# Patient Record
Sex: MALE | Race: BLACK OR AFRICAN AMERICAN | NOT HISPANIC OR LATINO | Employment: FULL TIME | URBAN - METROPOLITAN AREA
[De-identification: names, ages, dates, MRNs, and addresses within clinical notes are randomized per-mention and may not be internally consistent; named-entity substitution may affect disease eponyms.]

---

## 2017-09-18 ENCOUNTER — HOSPITAL ENCOUNTER (EMERGENCY)
Facility: HOSPITAL | Age: 57
Discharge: LEFT BEFORE DISCHARGE | End: 2017-09-18

## 2017-09-18 VITALS
HEIGHT: 74 IN | WEIGHT: 200 LBS | HEART RATE: 91 BPM | BODY MASS INDEX: 25.67 KG/M2 | TEMPERATURE: 98.2 F | DIASTOLIC BLOOD PRESSURE: 77 MMHG | RESPIRATION RATE: 16 BRPM | OXYGEN SATURATION: 98 % | SYSTOLIC BLOOD PRESSURE: 117 MMHG

## 2017-09-18 PROCEDURE — 99211 OFF/OP EST MAY X REQ PHY/QHP: CPT

## 2017-09-18 RX ORDER — ATORVASTATIN CALCIUM 40 MG/1
40 TABLET, FILM COATED ORAL DAILY
COMMUNITY
End: 2021-11-23 | Stop reason: SDUPTHER

## 2017-09-18 RX ORDER — HYDROCHLOROTHIAZIDE 25 MG/1
25 TABLET ORAL DAILY
COMMUNITY
End: 2021-11-23 | Stop reason: SDUPTHER

## 2017-09-18 RX ORDER — ASPIRIN 81 MG/1
81 TABLET ORAL DAILY
COMMUNITY

## 2017-09-18 RX ORDER — LISINOPRIL 5 MG/1
5 TABLET ORAL DAILY
COMMUNITY
End: 2021-08-06

## 2017-09-18 RX ORDER — OMEPRAZOLE 40 MG/1
40 CAPSULE, DELAYED RELEASE ORAL DAILY
COMMUNITY
End: 2021-11-23 | Stop reason: SDUPTHER

## 2017-09-18 RX ORDER — AMLODIPINE BESYLATE 10 MG/1
10 TABLET ORAL DAILY
COMMUNITY
End: 2021-08-06

## 2019-12-26 ENCOUNTER — HOSPITAL ENCOUNTER (OUTPATIENT)
Dept: OTHER | Facility: HOSPITAL | Age: 59
Discharge: HOME | End: 2019-12-26

## 2020-01-02 ENCOUNTER — HOSPITAL ENCOUNTER (OUTPATIENT)
Dept: OTHER | Facility: HOSPITAL | Age: 60
Discharge: HOME | End: 2020-01-02

## 2021-01-27 ENCOUNTER — OFFICE VISIT CONVERTED (OUTPATIENT)
Dept: UROLOGY | Facility: CLINIC | Age: 61
End: 2021-01-27
Attending: NURSE PRACTITIONER

## 2021-01-27 ENCOUNTER — HOSPITAL ENCOUNTER (OUTPATIENT)
Dept: OTHER | Facility: HOSPITAL | Age: 61
Discharge: HOME | End: 2021-01-27
Attending: NURSE PRACTITIONER

## 2021-01-27 LAB — PSA SERPL-MCNC: 0.67 NG/ML (ref 0–4)

## 2021-03-08 ENCOUNTER — OFFICE VISIT CONVERTED (OUTPATIENT)
Dept: UROLOGY | Facility: CLINIC | Age: 61
End: 2021-03-08
Attending: NURSE PRACTITIONER

## 2021-03-08 LAB
BILIRUB UR QL STRIP: NEGATIVE
COLOR UR: YELLOW
CONV BACTERIA IN URINE MICRO: 0
CONV CALCIUM OXALATE CRYSTALS /HPF IN URINE SEDIMENT BY MICROSCOPY: 0
CONV CLARITY OF URINE: CLEAR
CONV PROTEIN IN URINE BY AUTOMATED TEST STRIP: NEGATIVE
CONV UROBILINOGEN IN URINE BY AUTOMATED TEST STRIP: NORMAL
GLUCOSE UR QL: NEGATIVE
HGB UR QL STRIP: NEGATIVE
KETONES UR QL STRIP: NEGATIVE
LEUKOCYTE ESTERASE UR QL STRIP: NEGATIVE
NITRITE UR QL STRIP: NEGATIVE
PH UR STRIP.AUTO: 5.5 [PH]
RBC #/AREA URNS HPF: 0 /[HPF]
RENAL EPI CELLS #/AREA URNS HPF: 0 /[HPF]
SP GR UR: 1.02
SQUAMOUS SPT QL MICRO: 0
WBC #/AREA URNS HPF: 0 /[HPF]

## 2021-04-02 ENCOUNTER — OFFICE VISIT CONVERTED (OUTPATIENT)
Dept: FAMILY MEDICINE CLINIC | Age: 61
End: 2021-04-02
Attending: FAMILY MEDICINE

## 2021-04-02 ENCOUNTER — HOSPITAL ENCOUNTER (OUTPATIENT)
Dept: OTHER | Facility: HOSPITAL | Age: 61
Discharge: HOME | End: 2021-04-02
Attending: FAMILY MEDICINE

## 2021-04-02 LAB
ALBUMIN SERPL-MCNC: 4.2 G/DL (ref 3.5–5)
ALBUMIN/GLOB SERPL: 1.1 {RATIO} (ref 1.4–2.6)
ALP SERPL-CCNC: 98 U/L (ref 56–155)
ALT SERPL-CCNC: 20 U/L (ref 10–40)
ANION GAP SERPL CALC-SCNC: 20 MMOL/L (ref 8–19)
AST SERPL-CCNC: 20 U/L (ref 15–50)
BASOPHILS # BLD MANUAL: 0.04 10*3/UL (ref 0–0.2)
BASOPHILS NFR BLD MANUAL: 0.4 % (ref 0–3)
BILIRUB SERPL-MCNC: 0.63 MG/DL (ref 0.2–1.3)
BUN SERPL-MCNC: 12 MG/DL (ref 5–25)
BUN/CREAT SERPL: 12 {RATIO} (ref 6–20)
CALCIUM SERPL-MCNC: 9.3 MG/DL (ref 8.7–10.4)
CHLORIDE SERPL-SCNC: 95 MMOL/L (ref 99–111)
CHOLEST SERPL-MCNC: 109 MG/DL (ref 107–200)
CHOLEST/HDLC SERPL: 2.3 {RATIO} (ref 3–6)
CONV CO2: 26 MMOL/L (ref 22–32)
CONV CREATININE URINE, RANDOM: 69 MG/DL (ref 10–300)
CONV MICROALBUM.,U,RANDOM: <12 MG/L (ref 0–20)
CONV TOTAL PROTEIN: 8.2 G/DL (ref 6.3–8.2)
CREAT UR-MCNC: 1 MG/DL (ref 0.7–1.2)
DEPRECATED RDW RBC AUTO: 39.3 FL
EOSINOPHIL # BLD MANUAL: 0.04 10*3/UL (ref 0–0.7)
EOSINOPHIL NFR BLD MANUAL: 0.4 % (ref 0–7)
ERYTHROCYTE [DISTWIDTH] IN BLOOD BY AUTOMATED COUNT: 14.5 % (ref 11.5–14.5)
EST. AVERAGE GLUCOSE BLD GHB EST-MCNC: 200 MG/DL
GFR SERPLBLD BASED ON 1.73 SQ M-ARVRAT: >60 ML/MIN/{1.73_M2}
GLOBULIN UR ELPH-MCNC: 4 G/DL (ref 2–3.5)
GLUCOSE SERPL-MCNC: 140 MG/DL (ref 70–99)
GRANS (ABSOLUTE): 9.18 10*3/UL (ref 2–8)
GRANS: 82.9 % (ref 30–85)
HBA1C MFR BLD: 11.7 G/DL (ref 14–18)
HBA1C MFR BLD: 8.6 % (ref 3.5–5.7)
HCT VFR BLD AUTO: 36.9 % (ref 42–52)
HDLC SERPL-MCNC: 48 MG/DL (ref 40–60)
IMM GRANULOCYTES # BLD: 0.01 10*3/UL (ref 0–0.54)
IMM GRANULOCYTES NFR BLD: 0.1 % (ref 0–0.43)
LDLC SERPL CALC-MCNC: 36 MG/DL (ref 70–100)
LYMPHOCYTES # BLD MANUAL: 1.1 10*3/UL (ref 1–5)
LYMPHOCYTES NFR BLD MANUAL: 6.3 % (ref 3–10)
MCH RBC QN AUTO: 24 PG (ref 27–31)
MCHC RBC AUTO-ENTMCNC: 31.7 G/DL (ref 33–37)
MCV RBC AUTO: 75.8 FL (ref 80–96)
MICROALBUMIN/CREAT UR: 17.4 MG/G{CRE} (ref 0–25)
MONOCYTES # BLD AUTO: 0.7 10*3/UL (ref 0.2–1.2)
OSMOLALITY SERPL CALC.SUM OF ELEC: 286 MOSM/KG (ref 273–304)
PLATELET # BLD AUTO: 322 10*3/UL (ref 130–400)
PMV BLD AUTO: 10.3 FL (ref 7.4–10.4)
POTASSIUM SERPL-SCNC: 4 MMOL/L (ref 3.5–5.3)
PSA SERPL-MCNC: 0.47 NG/ML (ref 0–4)
RBC # BLD AUTO: 4.87 10*6/UL (ref 4.7–6.1)
SODIUM SERPL-SCNC: 137 MMOL/L (ref 135–147)
TRIGL SERPL-MCNC: 126 MG/DL (ref 40–150)
TSH SERPL-ACNC: 1.31 M[IU]/L (ref 0.27–4.2)
VARIANT LYMPHS NFR BLD MANUAL: 9.9 % (ref 20–45)
VLDLC SERPL-MCNC: 25 MG/DL (ref 5–37)
WBC # BLD AUTO: 11.07 10*3/UL (ref 4.8–10.8)

## 2021-04-19 ENCOUNTER — HOSPITAL ENCOUNTER (OUTPATIENT)
Dept: OTHER | Facility: HOSPITAL | Age: 61
Discharge: HOME | End: 2021-04-19
Attending: FAMILY MEDICINE

## 2021-04-19 LAB
BASOPHILS # BLD MANUAL: 0.06 10*3/UL (ref 0–0.2)
BASOPHILS NFR BLD MANUAL: 0.6 % (ref 0–3)
DEPRECATED RDW RBC AUTO: 38.5 FL
EOSINOPHIL # BLD MANUAL: 0.21 10*3/UL (ref 0–0.7)
EOSINOPHIL NFR BLD MANUAL: 2 % (ref 0–7)
ERYTHROCYTE [DISTWIDTH] IN BLOOD BY AUTOMATED COUNT: 14.1 % (ref 11.5–14.5)
FERRITIN SERPL-MCNC: 61 NG/ML (ref 30–300)
GRANS (ABSOLUTE): 7.55 10*3/UL (ref 2–8)
GRANS: 72.8 % (ref 30–85)
HBA1C MFR BLD: 10.4 G/DL (ref 14–18)
HCT VFR BLD AUTO: 32.6 % (ref 42–52)
IMM GRANULOCYTES # BLD: 0.02 10*3/UL (ref 0–0.54)
IMM GRANULOCYTES NFR BLD: 0.2 % (ref 0–0.43)
IRON SATN MFR SERPL: 13 % (ref 20–55)
IRON SERPL-MCNC: 47 UG/DL (ref 70–180)
LYMPHOCYTES # BLD MANUAL: 1.95 10*3/UL (ref 1–5)
LYMPHOCYTES NFR BLD MANUAL: 5.6 % (ref 3–10)
MCH RBC QN AUTO: 23.9 PG (ref 27–31)
MCHC RBC AUTO-ENTMCNC: 31.9 G/DL (ref 33–37)
MCV RBC AUTO: 74.9 FL (ref 80–96)
MONOCYTES # BLD AUTO: 0.58 10*3/UL (ref 0.2–1.2)
PLATELET # BLD AUTO: 296 10*3/UL (ref 130–400)
PMV BLD AUTO: 9.7 FL (ref 7.4–10.4)
RBC # BLD AUTO: 4.35 10*6/UL (ref 4.7–6.1)
TIBC SERPL-MCNC: 363 UG/DL (ref 245–450)
TRANSFERRIN SERPL-MCNC: 254 MG/DL (ref 215–365)
VARIANT LYMPHS NFR BLD MANUAL: 18.8 % (ref 20–45)
WBC # BLD AUTO: 10.37 10*3/UL (ref 4.8–10.8)

## 2021-05-06 ENCOUNTER — OFFICE VISIT CONVERTED (OUTPATIENT)
Dept: FAMILY MEDICINE CLINIC | Age: 61
End: 2021-05-06
Attending: FAMILY MEDICINE

## 2021-05-10 NOTE — H&P
History and Physical      Patient Name: Cristino Johns   Patient ID: [id number]   Sex: Male   YOB: 1960    Primary Care Provider: Argenis MEEHAN   Referring Provider: Argenis MEEHAN    Visit Date: January 27, 2021    Provider: SHEFALI Cerna   Location: [facility]   Location Address: [address] [place], [place] [address] [facility]  [address] [place] [address]  [id number]   Location Phone: [phone]          Chief Complaint  · pt here for urological concerns      History Of Present Illness  The patient is a 60 year old /Black male , who is self referred for evaluation of urinary symptoms.   Symptoms  The patient's complaints are described as frequency, nocturia, urgency of urination, urge incontinence, hesitancy, and weak stream. The patient needs to void more than ten times per day. He describes getting up more than 5 times during the night. The patient reports no additional symptoms. This patient denies gross hematuria and dysuria.   He states that nothing has made symptoms better in the past, and caffeine makes it worse. The patient's past medical history notable for diabetes and hypertension.      NO family hx of prostate cancer     No family Hx of bladder or renal maigancy     No  history of renal stones    non smoker quit 6 years ago     He does work night shift and does drink anywhere from 3 to 5 cups of coffee a day.               Past Medical History  Diabetes; GERD; Hyperlipidemia; Hypertension         Past Surgical History  Vascular Surgery         Medication List  amlodipine 5 mg oral tablet; aspirin 81 mg oral tablet,delayed release (DR/EC); atorvastatin 40 mg oral tablet; hydrochlorothiazide 25 mg oral tablet; losartan 100 mg oral tablet; omeprazole 40 mg oral capsule,delayed release(DR/EC); Trulicity 0.75 mg/0.5 mL subcutaneous pen injector         Allergy List  NO KNOWN DRUG ALLERGIES       Allergies Reconciled  Family Medical  "History  Hypertension; Cancer, Unspecified; Diabetes         Social History  Tobacco (Former)         Review of Systems  · Constitutional  o Denies  o : fever, chills  · Eyes  o Denies  o : double vision, cataracts  · HENT  o Denies  o : hearing loss, headaches  · Cardiovascular  o Denies  o : chest pain at rest, chest pain with exercise, irregular heart beats, palpitations, leg cramps with exercise  · Respiratory  o Denies  o : shortness of breath, wheezing, sleep apnea  · Gastrointestinal  o Denies  o : heartburn or indigestion, nausea or vomiting, change in abdominal girth, diarrhea, constipation, blood in stools  · Genitourinary  o Admits  o : additional symptoms as noted in HPI  · Integument  o Denies  o : rash, new skin lesions  · Neurologic  o Denies  o : memory difficulties, headache, mini-strokes, seizures  · Endocrine  o Denies  o : hot flashes, thyroid disorders  · Psychiatric  o Denies  o : depression, schizophrenia, bipolar disorder  · Heme-Lymph  o Denies  o : easy bleeding, easy bruising, sickle cell disease or trait, lymph node enlargement or tenderness  · Allergic-Immunologic  o Denies  o : immune deficiency, HIV, Hepatitis C      Vitals  Date Time BP Position Site L\R Cuff Size HR RR TEMP (F) WT  HT  BMI kg/m2 BSA m2 O2 Sat FR L/min FiO2 HC       01/27/2021 10:22 AM       16  316lbs 2oz 6'  2\" 40.59 2.74             Physical Examination  · Constitutional  o Appearance  o : Well nourished, well developed patient in no acute distress. Ambulating without difficulty.  · Head and Face  o Head  o :   § Inspection  § : atraumatic, normocephalic  o Face  o :   § Inspection  § : no facial lesions  · Eyes  o Sclerae  o : sclerae white  · Ears, Nose, Mouth and Throat  o Ears  o :   § External Ears  § : appearance within normal limits, no lesions present  o Nose  o :   § External Nose  § : appearance normal  · Neck  o Inspection/Palpation  o : normal appearance, trachea midline  · Respiratory  o Respiratory " Effort  o : breathing unlabored  o Inspection of Chest  o : normal appearance, no retractions  · Musculoskeletal  o Pelvis  o : no pelvic bony or muscular tenderness  o Ribs  o : no deformities or tenderness to palpation present, costochondral junctions nontender to palpation  · Skin and Subcutaneous Tissue  o General Inspection  o : no rashes or lesions present, no lesions present, no areas of discoloration  · Neurologic  o Mental Status Examination  o :   § Orientation  § : grossly oriented to person, place and time  § Speech/Language  § : communication ability within normal limits  o Gait and Station  o : normal gait, able to stand without difficulty  · Psychiatric  o Judgement and Insight  o : judgment and insight intact, judgement for everyday activities and social situations within normal limits, insight intact  o Mood and Affect  o : mood normal, affect appropriate          Results  · In-Office Procedures  o Lab procedure  § Automated dipstick urinalysis with microscopy ([id number])   § Color Ur: Yellow   § Clarity Ur: Clear   § Glucose Ur Ql Strip: Negative   § Bilirub Ur Ql Strip: Negative   § Ketones Ur Ql Strip: Negative   § Sp Gr Ur Qn: 1.010   § Hgb Ur Ql Strip: Negative   § pH Ur-LsCnc: 5.5   § Prot Ur Ql Strip: Negative   § Urobilinogen Ur Strip-mCnc: 0.2 E.U./dL   § Nitrite Ur Ql Strip: Negative   § WBC Est Ur Ql Strip: Negative   § RBC UrnS Qn HPF: 0   § WBC UrnS Qn HPF: 0   § Bacteria UrnS Qn HPF: 0   § Crystals UrnS Qn HPF: 0   § Epithelial Cells (non renal): 0 /HPF  § Epithelial Cells (renal): 0   o Surgical procedure  § IOP - Bladder Scan/Residual Urine ([id number])   § Specimen vol Ur: 0       Assessment  · Nocturia     788.43/R35.1  · Urgency     788.63  · Urinary Frequency     788.41/R35.0      Plan  · Orders  o PSA Ultrasensitive, ANNUAL SCREENING [facility] (06597, ) - 788.43/R35.1, 788.63, 788.41/R35.0 - 01/27/2021  · Medications  o tamsulosin 0.4 mg oral capsule   SIG: take 1 capsule (0.4 mg) by oral  route once daily 1/2 hour following the same meal each day for 90 days   DISP: (90) Capsule with 3 refills  Prescribed on 01/27/2021     o Medications have been Reconciled  o Transition of Care or Provider Policy  · Instructions  o DISCUSSION:  o Findings, possible etiologies and management options were discussed with patient in comprehensive detail. Discussed with the patient that he will need to cut down on his caffeine consumption and elevate his feet for at least 2 hours prior to bed to eliminate any pooling of fluids that may occur throughout the day and alleviate this before he goes to bed. Educated patient that he will need to stop eating or drinking for at least 2 hours prior to going to bed as well as he drinks coffee up until at least 1 hour prior to bed and will drink until going to bed. Discussed with the patient that this could be related to an enlarged prostate or could be related to his increased caffeine consumption complicated by his diabetes and hypertension as well.  o PLAN:will start tamsulosin and f/u in 6 weeks he will have his PSA checked and we will perform a digital rectal exam at next visit.  o Electronically Identified Patient Education Materials Provided Electronically            Electronically Signed by: SHEFALI Cerna -Author on January 27, 2021 12:58:13 PM

## 2021-05-11 ENCOUNTER — OFFICE VISIT CONVERTED (OUTPATIENT)
Dept: FAMILY MEDICINE CLINIC | Age: 61
End: 2021-05-11
Attending: FAMILY MEDICINE

## 2021-05-11 ENCOUNTER — HOSPITAL ENCOUNTER (OUTPATIENT)
Dept: OTHER | Facility: HOSPITAL | Age: 61
Discharge: HOME | End: 2021-05-11
Attending: FAMILY MEDICINE

## 2021-05-11 LAB — D DIMER PPP FEU-MCNC: 0.28 MG/L (ref 0–0.59)

## 2021-05-14 VITALS — HEIGHT: 74 IN | RESPIRATION RATE: 16 BRPM | BODY MASS INDEX: 40.43 KG/M2 | WEIGHT: 315 LBS

## 2021-05-14 VITALS — BODY MASS INDEX: 40.43 KG/M2 | RESPIRATION RATE: 16 BRPM | HEIGHT: 74 IN | WEIGHT: 315 LBS

## 2021-05-14 NOTE — PROGRESS NOTES
Progress Note      Patient Name: Cristino Johns   Patient ID: [id number]   Sex: Male   YOB: 1960    Primary Care Provider: Argenis MEEHAN   Referring Provider: Argenis MEEHAN    Visit Date: March 8, 2021    Provider: SHEFALI Cerna   Location: [facility] [place]   Location Address: [address] [place], [place] [address]an [facility]  [address] [place] [address]  [id number]   Location Phone: [phone]          Chief Complaint  · pt here for urological concerns      History Of Present Illness  The patient is a 60 year old /Black male , presents for f/u on BPH W/ LUTS.        He was started on Tamsulosin at last visit and is doing well at this time.     He has cut down of coffee intake and wishes to stay on Tamsulosin.     He is with no complaints today.    PSA WNL       Previous:  NO family hx of prostate cancer     No family Hx of bladder or renal maigancy     No  history of renal stones    non smoker quit 6 years ago     He does work night shift and does drink anywhere from 3 to 5 cups of coffee a day.               Past Medical History  Diabetes; GERD; Hyperlipidemia; Hypertension         Past Surgical History  Vascular Surgery         Medication List  amlodipine 5 mg oral tablet; aspirin 81 mg oral tablet,delayed release (DR/EC); atorvastatin 40 mg oral tablet; hydrochlorothiazide 25 mg oral tablet; losartan 100 mg oral tablet; omeprazole 40 mg oral capsule,delayed release(DR/EC); tamsulosin 0.4 mg oral capsule; Trulicity 0.75 mg/0.5 mL subcutaneous pen injector         Allergy List  NO KNOWN DRUG ALLERGIES       Allergies Reconciled  Family Medical History  Hypertension; Cancer, Unspecified; Diabetes         Social History  Tobacco (Former)         Review of Systems  · Constitutional  o Denies  o : fever, chills  · Eyes  o Denies  o : double vision, cataracts  · HENT  o Denies  o : hearing loss, headaches  · Cardiovascular  o Denies  o : chest pain at rest,  "chest pain with exercise, irregular heart beats, palpitations, leg cramps with exercise  · Respiratory  o Denies  o : shortness of breath, wheezing, sleep apnea  · Gastrointestinal  o Denies  o : heartburn or indigestion, nausea or vomiting, change in abdominal girth, diarrhea, constipation, blood in stools  · Genitourinary  o Admits  o : additional symptoms as noted in HPI  · Integument  o Denies  o : rash, new skin lesions  · Neurologic  o Denies  o : memory difficulties, headache, mini-strokes, seizures  · Endocrine  o Denies  o : hot flashes, thyroid disorders  · Psychiatric  o Denies  o : depression, schizophrenia, bipolar disorder  · Heme-Lymph  o Denies  o : easy bleeding, easy bruising, sickle cell disease or trait, lymph node enlargement or tenderness  · Allergic-Immunologic  o Denies  o : immune deficiency, HIV, Hepatitis C      Vitals  Date Time BP Position Site L\R Cuff Size HR RR TEMP (F) WT  HT  BMI kg/m2 BSA m2 O2 Sat FR L/min FiO2 HC       03/08/2021 08:28 AM       16  325lbs 6oz 6'  2\" 41.78 2.78             Physical Examination  · Constitutional  o Appearance  o : Well nourished, well developed patient in no acute distress. Ambulating without difficulty.  · Head and Face  o Head  o :   § Inspection  § : atraumatic, normocephalic  o Face  o :   § Inspection  § : no facial lesions  · Eyes  o Sclerae  o : sclerae white  · Ears, Nose, Mouth and Throat  o Ears  o :   § External Ears  § : appearance within normal limits, no lesions present  o Nose  o :   § External Nose  § : appearance normal  · Neck  o Inspection/Palpation  o : normal appearance, trachea midline  · Respiratory  o Respiratory Effort  o : breathing unlabored  o Inspection of Chest  o : normal appearance, no retractions  · Musculoskeletal  o Pelvis  o : no pelvic bony or muscular tenderness  o Ribs  o : no deformities or tenderness to palpation present, costochondral junctions nontender to palpation  · Skin and Subcutaneous " Tissue  o General Inspection  o : no rashes or lesions present, no lesions present, no areas of discoloration  · Neurologic  o Mental Status Examination  o :   § Orientation  § : grossly oriented to person, place and time  § Speech/Language  § : communication ability within normal limits  o Gait and Station  o : normal gait, able to stand without difficulty  · Psychiatric  o Judgement and Insight  o : judgment and insight intact, judgement for everyday activities and social situations within normal limits, insight intact  o Mood and Affect  o : mood normal, affect appropriate          Results  · In-Office Procedures  o Lab procedure  § Automated dipstick urinalysis with microscopy ([id number])   § Color Ur: Yellow   § Clarity Ur: Clear   § Glucose Ur Ql Strip: Negative   § Bilirub Ur Ql Strip: Negative   § Ketones Ur Ql Strip: Negative   § Sp Gr Ur Qn: 1.025   § Hgb Ur Ql Strip: Negative   § pH Ur-LsCnc: 5.5   § Prot Ur Ql Strip: Negative   § Urobilinogen Ur Strip-mCnc: 1.0 E.U./dL   § Nitrite Ur Ql Strip: Negative   § WBC Est Ur Ql Strip: Negative   § RBC UrnS Qn HPF: 0   § WBC UrnS Qn HPF: 0   § Bacteria UrnS Qn HPF: 0   § Crystals UrnS Qn HPF: 0   § Epithelial Cells (non renal): 0 /HPF  § Epithelial Cells (renal): 0       Assessment  · Nocturia     788.43/R35.1  · Urgency     788.63  · Urinary Frequency     788.41/R35.0      Plan  · Medications  o Medications have been Reconciled  o Transition of Care or Provider Policy  · Instructions  o DISCUSSION:  o Patient is doing well with no complaint and no off putting side effects of medication.  o PLAN: Continue tamsulosin and f/u in 6 months.  o Electronically Identified Patient Education Materials Provided Electronically            Electronically Signed by: SHEFALI Cerna -Author on March 8, 2021 08:48:22 AM

## 2021-05-18 NOTE — PROGRESS NOTES
"Cristino Jhons  1960     Office/Outpatient Visit    Visit Date: Tue, May 11, 2021 02:58 pm    Provider: Duane Sinclair MD (Assistant: Claudine Pena MA)    Location: [facility]        Electronically signed by Duane Sinclair MD on  05/11/2021 06:13:26 PM                             Subjective:        CC: Mr. Johns is a 60 year old Black or  male.  throbbing severe pain from heal of left foot going all the way to the knee         HPI:       Cristino presents clinic today for evaluation of left heel and left leg pain.  This has been present for the last several weeks and is getting progressively worse.  He denies any known inciting event or injury.  He has experienced something similar on his right leg several years ago that resolved spontaneously.  Pain is primarily located at his right heel in the morning but then begins to radiate up the back of his leg to behind his knee.  Once the pain is located behind his knee, he says that it is \"throbbing\" in nature.  The left lower extremity is neither swollen nor red.    ROS:     CONSTITUTIONAL:  Negative for chills, fatigue, fever, and weight change.      EYES:  Negative for blurred vision.      E/N/T:  Negative for ear pain and tinnitus.      CARDIOVASCULAR:  Positive for chest pain ( unrelated to exertion ).   Negative for dizziness, palpitations or edema.      RESPIRATORY:  Negative for dyspnea and cough.      GASTROINTESTINAL:  Positive for heartburn ( stable ).   Negative for abdominal pain, constipation, diarrhea, nausea or vomiting.      MUSCULOSKELETAL:  Positive for Left leg pain.      INTEGUMENTARY:  Negative for rash.      NEUROLOGICAL:  Negative for headaches, paresthesias and weakness.      PSYCHIATRIC:  Negative for anxiety, depression, and sleep disturbances.          Past Medical History / Family History / Social History:         Last Reviewed on 5/11/2021 06:13 PM by Duane Sinclair    Past Medical History:        "      PAST MEDICAL HISTORY         Positive for    Hyperlipidemia and    Hypertension;     Positive for    Gastroesophageal Reflux Disease;     Positive for    Type 2 Diabetes;         Surgical History:         Positive for    Esophageal tumor removal; and    Vocal cord polyp removal;;         Family History:         Positive for Hypertension;     Positive for Breast Cancer, Leukemia and Lung Cancer;         Social History:     Occupation: [employer];     Marital Status: Engaged     Children: 4 children and 3 step-children         Tobacco/Alcohol/Supplements:     Last Reviewed on 5/11/2021 06:13 PM by Duane Sinclair    Tobacco: He has a past history of cigarette smoking; quit date:  2015.          Substance Abuse History:     Last Reviewed on 5/11/2021 06:13 PM by Duane Sinclair        Mental Health History:     Last Reviewed on 5/11/2021 06:13 PM by Duane Sinclair        Communicable Diseases (eg STDs):     Last Reviewed on 5/11/2021 06:13 PM by Duane Sinclair        Current Problems:     Last Reviewed on 5/11/2021 06:13 PM by Duane Sinclair    Type 2 diabetes mellitus without complications    Hyperlipidemia, unspecified    Essential (primary) hypertension    Gastro-esophageal reflux disease without esophagitis    Chest pain, unspecified    Encounter for screening for malignant neoplasm of colon    Encounter for screening for malignant neoplasm of prostate    Encounter for screening for depression    Anemia, unspecified    Pain in left leg        Immunizations:     SARS-COV-2 (COVID-19) vaccine, UNSPECIFIED 4/1/2021    SARS-COV-2 (COVID-19) vaccine, UNSPECIFIED 4/22/2021        Allergies:     Last Reviewed on 5/11/2021 06:13 PM by Duane Sinclair    No Known Allergies.        Current Medications:     Last Reviewed on 5/11/2021 06:13 PM by Duane Sinclair    aspirin 81 mg oral tablet, delayed release (enteric coated) [take 1 tablet (81 mg) by oral route once daily]    amLODIPine 10 mg oral tablet [TAKE 1 TABLET BY MOUTH EVERY  "DAY]    atorvastatin 40 mg oral tablet [TAKE 1 TABLET BY MOUTH EVERY DAY]    LOSARTAN POTASSIUM 100 MG TAB  [TAKE 1 TABLET BY MOUTH EVERY DAY]    METFORMIN HCL 1,000 MG TABLET  [TAKE 1 TABLET BY MOUTH TWICE A DAY WITH MEALS]    OMEPRAZOLE DR 40 MG CAPSULE  [TAKE 1 CAPSULE BY MOUTH DAILY AS NEEDED (GERD).]    HYDROCHLOROTHIAZIDE 25 MG TAB  [TAKE 1 TABLET BY MOUTH EVERY DAY]    Trulicity 1.5 mg/0.5 mL subcutaneous Pen Injector [inject 0.5 milliliter (1.5 mg) by subcutaneous route every 7 days]    HYDROcodone-acetaminophen 5-325 mg oral tablet [take 1 tablet by oral route every 8 hours as needed for pain]        Objective:        Vitals:         Current: 5/11/2021 3:00:23 PM    Ht:  6 ft, 2 in;  Wt: 318.2 lbs;  BMI: 40.9T: 97.2 F (temporal);  BP: 142/73 mm Hg (left arm, sitting);  P: 75 bpm (left arm (BP Cuff), sitting);  sCr: 1 mg/dL;  GFR: 99.77        Exams:     PHYSICAL EXAM:     GENERAL: Vitals recorded well developed, well nourished;  no apparent distress;     EYES: conjunctiva and cornea are normal;     NECK: trachea is midline; thyroid is non-palpable;     RESPIRATORY: Clear to auscultation bilateally; no rales (\"crackles\") present; no rhonchi; no wheezes;     CARDIOVASCULAR: normal rate; rhythm is regular;  No murmurs, clicks, gallops or rubs appreciated; no edema;     GASTROINTESTINAL: nontender; Soft and nondistended; normal bowel sounds; no organomegaly; no masses;     SKIN:  No significant rashes, lesions or suspicious moles within limits of examination;     MUSCULOSKELETAL: gait: affected by a left leg limp and OTHER (enter);  muscle strength: 5/5 in all major muscle groups;  normal overall tone Tenderness to palpation of left popliteal fossa and left calf; No lower extremity edema or erythema;     NEUROLOGIC: mental status: alert and oriented x 3; Grossly intact;     PSYCHIATRIC: appropriate affect and demeanor; normal speech pattern; Normal behavior;         Assessment:         M79.605   Pain in left " leg           ORDERS:         Radiology/Test Orders:       [id number]  Radiologic examination, left ankle; complete minimum 3 views  (Send-Out)            [id number]  Left radiologic examination, foot; complete, minimum of three views  (Send-Out)            [id number]  Left  radiologic examination, knee; three views  (Send-Out)            [id number]  Left radiologic examination; tibia and fibula, 2 views  (Send-Out)              Lab Orders:       [id number]  D-DIM - [facility] D-Dimer  (Send-Out)                      Plan:         Pain in left leg- Unclear etiology.  X-rays ordered for evaluation.  Short course of hydrocodone given for pain.  Additional pain control with Tylenol and ibuprofen.  Upon examination, DVT seems to be less likely but we will order a D-dimer to r/o.  If symptoms persist, will consider further evaluation and/or specialist referral.    LABORATORY:  Labs ordered to be performed today include D-Dimer.      RADIOLOGY:  I have ordered a left ankle xray, a left foot x-ray, a left knee x-ray, and a Left Tibia and fibula xray to be done today.            Orders:       [id number]  Radiologic examination, left ankle; complete minimum 3 views  (Send-Out)            [id number]  Left radiologic examination, foot; complete, minimum of three views  (Send-Out)            [id number]  Left  radiologic examination, knee; three views  (Send-Out)            [id number]  Left radiologic examination; tibia and fibula, 2 views  (Send-Out)            [id number]  D-DIM - [facility] D-Dimer  (Send-Out)                  Patient Recommendations:        For  Pain in left leg:    left ankle x-ray             Charge Capture:         Primary Diagnosis:     M79.605  Pain in left leg           Orders:      [id number]  Office/outpatient visit; established patient, level 3  (In-House)                  ADDENDUMS:      ____________________________________    Addendum: 05/12/2021 05:54 PM - Duane Sinclair        Orders:    REMOVE:   [id number]    ADD:   [id number]    -[email or web address]

## 2021-05-18 NOTE — PROGRESS NOTES
Cristino Johns  1960     Office/Outpatient Visit    Visit Date: Fri, Apr 2, 2021 12:50 pm    Provider: Duane Sinclair MD (Assistant: Piper Partida, )    Location: [facility]        Electronically signed by Duane Sinclair MD on  04/02/2021 05:37:00 PM                             Subjective:        CC: Mr. Johns is a 60 year old Black or  male.  This is his first visit to the clinic.  chest tightness/pain         HPI:           PHQ-9 Depression Screening: Completed form scanned and in chart; Total Score 2           Concerning essential (primary) hypertension, his current cardiac medication regimen includes a diuretic ( HCTZ 25 mg QD ), a calcium channel blocker ( Amlodipine 5 mg QD ), and an angiotensin receptor blocker ( Losartan 100 mg QD ).  Compliance with treatment has been good; he takes his medication as directed and follows up as directed.  He is tolerating the medication well without side effects.  He has not kept a blood pressure diary, but states that pressures have been okay.            Hyperlipidemia, unspecified details; current treatment includes Lipitor.  Compliance with treatment has been good; he takes his medication as directed and follows up as directed.  He denies experiencing any hypercholesterolemia related symptoms.  He does not know results of any recent lab monitoring.  Concurrent health problems include hypertension and diabetes.            In regard to the type 2 diabetes mellitus without complications, specifically, this is type 2, non-insulin requiring diabetes without complications.  Compliance with treatment has been good; he takes his medication as directed and follows up as directed.  He denies experiencing any diabetes related symptoms.  Current meds include an oral hypoglycemic ( Glucophage ( 1000mg bid ) ) and insulin/injectable ( Trulicity- 0.75 mg weekly ).  He does not know results of any recent lab monitoring.  Concurrent health  problems include hypertension and hypercholesterolemia.        That his symptoms are stable on his current regimen of omeprazole 40 mg daily.  He denies dysphagia, abdominal pain, nausea, vomiting, diarrhea, melena or hematochezia.        Cristino is overdue for both colon and prostate cancer screening.  He is willing to have a referral placed for colonoscopy and a PSA ordered today.        Finally, Cristino reports that he has been experiencing left central chest pressure that occurs intermittently.  It seems to be related to food intake and described as a sharp pressure just to the left and below his sternum.  He denies shortness of breath.  He denies palpitations.  No diaphoresis.    ROS:     CONSTITUTIONAL:  Negative for chills, fatigue, fever, and weight change.      EYES:  Negative for blurred vision.      E/N/T:  Negative for ear pain and tinnitus.      CARDIOVASCULAR:  Positive for chest pain ( unrelated to exertion ).   Negative for dizziness, palpitations or edema.      RESPIRATORY:  Negative for dyspnea and cough.      GASTROINTESTINAL:  Positive for heartburn ( stable ).   Negative for abdominal pain, constipation, diarrhea, nausea or vomiting.      GENITOURINARY:  Negative for dysuria, hematuria and polyuria.      MUSCULOSKELETAL:  Positive for Left heel pain.      INTEGUMENTARY:  Negative for rash.      NEUROLOGICAL:  Negative for headaches, paresthesias and weakness.      HEMATOLOGIC/LYMPHATIC:  Negative for easy bruising and excessive bleeding.      ENDOCRINE:  Negative for hair loss, heat/cold intolerance, polydipsia, and polyphagia.      PSYCHIATRIC:  Negative for anxiety, depression, and sleep disturbances.          Past Medical History / Family History / Social History:         Last Reviewed on 4/02/2021 05:36 PM by Duane Sinclair    Past Medical History:             PAST MEDICAL HISTORY         Positive for    Hyperlipidemia and    Hypertension;     Positive for    Gastroesophageal Reflux Disease;      Positive for    Type 2 Diabetes;         Surgical History:         Positive for    Esophageal tumor removal; and    Vocal cord polyp removal;;         Family History:         Positive for Hypertension;     Positive for Breast Cancer, Leukemia and Lung Cancer;         Social History:     Occupation: [employer];     Marital Status: Engaged     Children: 4 children and 3 step-children         Tobacco/Alcohol/Supplements:     Last Reviewed on 4/02/2021 05:36 PM by Duane Sinclair    Tobacco: He has a past history of cigarette smoking; quit date:  2015.          Substance Abuse History:     Last Reviewed on 4/02/2021 05:36 PM by uDane Sinclair        Mental Health History:     Last Reviewed on 4/02/2021 05:36 PM by Duane Sinclair        Communicable Diseases (eg STDs):     Last Reviewed on 4/02/2021 05:36 PM by Duane Sinclair        Current Problems:     Last Reviewed on 4/02/2021 05:36 PM by Duane Sinclair    Type 2 diabetes mellitus without complications    Hyperlipidemia, unspecified    Essential (primary) hypertension    Gastro-esophageal reflux disease without esophagitis    Chest pain, unspecified    Encounter for screening for malignant neoplasm of colon    Encounter for screening for malignant neoplasm of prostate    Encounter for screening for depression        Immunizations:     SARS-COV-2 (COVID-19) vaccine, UNSPECIFIED 4/1/2021        Current Medications:     Last Reviewed on 4/02/2021 05:36 PM by Duane Sinclair    aspirin 81 mg oral tablet, delayed release (enteric coated) [take 1 tablet (81 mg) by oral route once daily]    AMLODIPINE BESYLATE 5 MG TAB [TAKE 1 TABLET BY MOUTH EVERY DAY]    TRULICITY 0.75 MG/0.5 ML PEN [INJECT 0.75 MG INTO THE SKIN ONCE A WEEK.]    ATORVASTATIN 40 MG TABLET [TAKE 1 TABLET BY MOUTH EVERY DAY]    LOSARTAN POTASSIUM 100 MG TAB [TAKE 1 TABLET BY MOUTH EVERY DAY]    METFORMIN HCL 1,000 MG TABLET [TAKE 1 TABLET BY MOUTH TWICE A DAY WITH MEALS]    OMEPRAZOLE DR 40 MG CAPSULE [TAKE 1  "CAPSULE BY MOUTH DAILY AS NEEDED (GERD).]    HYDROCHLOROTHIAZIDE 25 MG TAB [TAKE 1 TABLET BY MOUTH EVERY DAY]        Objective:        Vitals:         Current: 4/2/2021 12:57:16 PM    Ht:  6 ft, 2 in;  Wt: 316.6 lbs;  BMI: 40.6T: 98.4 F (temporal);  BP: 145/79 mm Hg (left arm, sitting);  P: 92 bpm (left arm (BP Cuff), sitting)        Repeat:     1:52:20 PM  BP:   136/82mm Hg (left arm, sitting, HR: 87)     Exams:     PHYSICAL EXAM:     GENERAL: Vitals recorded well developed, well nourished;  no apparent distress;     EYES: conjunctiva and cornea are normal;     E/N/T:  normal EACs, TMs, nasal/oral mucosa, teeth, gingiva, and oropharynx;     NECK: trachea is midline; thyroid is non-palpable;     RESPIRATORY: Clear to auscultation bilateally; no rales (\"crackles\") present; no rhonchi; no wheezes;     CARDIOVASCULAR: normal rate; rhythm is regular;  No murmurs, clicks, gallops or rubs appreciated; no edema;     GASTROINTESTINAL: nontender; Soft and nondistended; normal bowel sounds; no organomegaly; no masses;     LYMPHATIC: no enlargement of cervical or facial nodes; no supraclavicular nodes;     SKIN:  No significant rashes, lesions or suspicious moles within limits of examination;     MUSCULOSKELETAL: muscle strength: 5/5 in all major muscle groups;  normal overall tone     NEUROLOGIC: Grossly intact; mental status: alert and oriented x 3;     PSYCHIATRIC: appropriate affect and demeanor; normal speech pattern; Normal behavior;         Lab/Test Results:         LABORATORY RESULTS: EKG performed by tls         Assessment:         I10   Essential (primary) hypertension       E78.5   Hyperlipidemia, unspecified       E11.9   Type 2 diabetes mellitus without complications       K21.9   Gastro-esophageal reflux disease without esophagitis       Z12.11   Encounter for screening for malignant neoplasm of colon       Z12.5   Encounter for screening for malignant neoplasm of prostate       R07.9   Chest pain, unspecified   "     Z13.31   Encounter for screening for depression           ORDERS:         Radiology/Test Orders:       [id number]  Electrocardiogram, routine with at least 12 leads; with interpretation and report  (In-House)              Lab Orders:       *  PRSAS Medicare screening PSA  (Send-Out)            [id number]  DIAB - [facility] CMP A1C LIPID AND MICRO ALBUM CR RATIO: [id number]  (Send-Out)            87[facility]  [facility]  ([facility])            35449  TSH - [facility] TSH  (Send-Out)              Procedures Ordered:       REFER  Referral to Specialist or Other Facility  (Send-Out)            REFER  Referral to Specialist or Other Facility  (Send-Out)              Other Orders:         Depression screen negative  (In-House)                      Plan:         Essential (primary) hypertension- Stable.  Continue HCTZ 25 mg daily, losartan 100 mg daily and amlodipine 5 mg daily.    LABORATORY:  Labs ordered to be performed today include CBC and TSH.            Orders:       [id number]  [facility] with 3 part diff  (Send-Out)            22447  TSH - [facility] TSH  (Send-Out)              Hyperlipidemia, unspecifiedUnknown control.  Continue atorvastatin 40 mg daily.  Lipid panel ordered today.        Type 2 diabetes mellitus without complicationsUnknown control.  Continue Metformin 1000 g twice daily and Trulicity 0.57 mg weekly.  A1c and urine microalbumin creatinine ratio ordered today.    LABORATORY:  Labs ordered to be performed today include Diabetes Panel 2;CMP, A1C, Lipid, Microalbumin:Creatinine Ratio.            Orders:       [id number]  DIAB - [facility] CMP A1C LIPID AND MICRO ALBUM CR RATIO: [id number]  (Send-Out)              Gastro-esophageal reflux disease without esophagitisStable.  Continue omeprazole 40 mg daily.        Encounter for screening for malignant neoplasm of colonColonoscopy referral placed.        REFERRALS:  Referral initiated to a general surgeon ( a colonoscopy ).             Orders:       REFER  Referral to Specialist or Other Facility  (Send-Out)              Encounter for screening for malignant neoplasm of prostatePSA ordered.    LABORATORY:  Labs ordered to be performed today include PSA.            Orders:       *  [employer] Medicare screening PSA  (Send-Out)              Chest pain, unspecifiedEKG performed in office today shows left anterior fascicular block and right bundle branch block.  We have no prior EKGs to compare to but with these changes in the setting of chest pain, albeit unrelated to exertion, we will still refer to cardiology to see if they would like to stress test this patient with known risk factors (family history, diabetes, HLD and hypertension).        TESTS/PROCEDURES:  Will proceed with an ECG to be performed/scheduled now.      REFERRALS:  Referral initiated to a cardiologist ( Dr. Ajay Magallon, [facility] ).            Orders:       [id number]  Electrocardiogram, routine with at least 12 leads; with interpretation and report  (In-House)            REFER  Referral to Specialist or Other Facility  (Send-Out)              Encounter for screening for depression    MIPS PHQ-9 Depression Screening: Completed form scanned and in chart; Total Score 2; Negative Depression Screen           Orders:         Depression screen negative  (In-House)                  Charge Capture:         Primary Diagnosis:     I10  Essential (primary) hypertension           Orders:      [id number]  Office/outpatient visit; new patient, level 4  (In-House)              E78.5  Hyperlipidemia, unspecified     E11.9  Type 2 diabetes mellitus without complications     K21.9  Gastro-esophageal reflux disease without esophagitis     Z12.11  Encounter for screening for malignant neoplasm of colon     Z12.5  Encounter for screening for malignant neoplasm of prostate     R07.9  Chest pain, unspecified           Orders:      [id number]  Electrocardiogram, routine with at  least 12 leads; with interpretation and report  (In-House)              Z13.31  Encounter for screening for depression           Orders:        Depression screen negative  (In-House)

## 2021-05-18 NOTE — PROGRESS NOTES
Cristino Johns  1960     Office/Outpatient Visit    Visit Date: Thu, May 6, 2021 09:31 am    Provider: Duane Sinclair MD (Assistant: Piper Partida, )    Location: [facility]        Electronically signed by Duane Sinclair MD on  05/06/2021 12:25:03 PM                             Subjective:        CC: Mr. Johns is a 60 year old Black or  male.  This is a follow-up visit.          HPI:           Patient to be evaluated for type 2 diabetes mellitus without complications.  Specifically, this is type 2, non-insulin requiring diabetes without complications.  Compliance with treatment has been good; he takes his medication as directed and follows up as directed.  He denies experiencing any diabetes related symptoms.  Current meds include an oral hypoglycemic ( Glucophage ( 1000mg bid ) ) and insulin/injectable ( Trulicity- 1.5 mg weekly ).  Most recent lab results include Hemoglobin A1c:  8.6 (%) (04/02/2021).  Concurrent health problems include hypertension and hypercholesterolemia.            Hyperlipidemia, unspecified details; current treatment includes Lipitor.  Compliance with treatment has been good; he takes his medication as directed and follows up as directed.  He denies experiencing any hypercholesterolemia related symptoms.  Most recent lab tests include HDL:  48 (mg/dL) (04/02/2021), LDL:  36 (mg/dL) (04/02/2021), Total Cholesterol:  109 (mg/dL) (04/02/2021), Triglycerides:  126 (mg/dL) (04/02/2021).  Concurrent health problems include hypertension and diabetes.            Dx with essential (primary) hypertension; his current cardiac medication regimen includes a diuretic ( HCTZ 25 mg QD ), a calcium channel blocker ( Amlodipine 5 mg QD ), and an angiotensin receptor blocker ( Losartan 100 mg QD ).  Compliance with treatment has been good; he takes his medication as directed and follows up as directed.  He is tolerating the medication well without side effects.   He has not kept a blood pressure diary, but states that pressures have been okay.        Pertaining to GERD, Cristino reportst that his symptoms are stable on his current regimen of omeprazole 40 mg daily.  He denies dysphagia, abdominal pain, nausea, vomiting, diarrhea, melena or hematochezia.        At last visit, Cristino reported  experiencing intermittent left central chest pressure.  He said it seemed to be related to food intake and described as a sharp pressure just to the left and below his sternum. An EKG was performed that showed He denies shortness of breath.  He denies palpitations.  No diaphoresis.          After last visit, Cristino was found a HGb of 11.7..  He has no prior history of anemia and his lab was repeated with hemoglobin trending down to 10.4.  Iron panel and ferritin demonstrated this is likely iron deficiency anemia.  He has been referred for a screening colonoscopy this is scheduled for today.  He denies any easy bruising or bleeding. No shortness of breath. No hematochezia or melena.    ROS:     CONSTITUTIONAL:  Negative for chills, fatigue, fever, and weight change.      EYES:  Negative for blurred vision.      E/N/T:  Negative for ear pain and tinnitus.      CARDIOVASCULAR:  Positive for chest pain ( unrelated to exertion ).   Negative for dizziness, palpitations or edema.      RESPIRATORY:  Negative for dyspnea and cough.      GASTROINTESTINAL:  Positive for heartburn ( stable ).   Negative for abdominal pain, constipation, diarrhea, nausea or vomiting.      GENITOURINARY:  Negative for dysuria, hematuria and polyuria.      MUSCULOSKELETAL:  Positive for Left heel pain.      INTEGUMENTARY:  Negative for rash.      NEUROLOGICAL:  Negative for headaches, paresthesias and weakness.      HEMATOLOGIC/LYMPHATIC:  Negative for easy bruising and excessive bleeding.      ENDOCRINE:  Negative for hair loss, heat/cold intolerance, polydipsia, and polyphagia.      PSYCHIATRIC:  Negative for anxiety,  depression, and sleep disturbances.          Past Medical History / Family History / Social History:         Last Reviewed on 5/06/2021 12:24 PM by Duane Sinclair    Past Medical History:             PAST MEDICAL HISTORY         Positive for    Hyperlipidemia and    Hypertension;     Positive for    Gastroesophageal Reflux Disease;     Positive for    Type 2 Diabetes;         Surgical History:         Positive for    Esophageal tumor removal; and    Vocal cord polyp removal;;         Family History:         Positive for Hypertension;     Positive for Breast Cancer, Leukemia and Lung Cancer;         Social History:     Occupation: [employer];     Marital Status: Engaged     Children: 4 children and 3 step-children         Tobacco/Alcohol/Supplements:     Last Reviewed on 5/06/2021 12:24 PM by Duane Sinclair    Tobacco: He has a past history of cigarette smoking; quit date:  2015.          Substance Abuse History:     Last Reviewed on 5/06/2021 12:24 PM by Duane Sinclair        Mental Health History:     Last Reviewed on 5/06/2021 12:24 PM by Duane Sinclair        Communicable Diseases (eg STDs):     Last Reviewed on 5/06/2021 12:24 PM by Duane Sinclair        Current Problems:     Last Reviewed on 5/06/2021 12:24 PM by Duane Sinclair    Type 2 diabetes mellitus without complications    Hyperlipidemia, unspecified    Essential (primary) hypertension    Gastro-esophageal reflux disease without esophagitis    Chest pain, unspecified    Encounter for screening for malignant neoplasm of colon    Encounter for screening for malignant neoplasm of prostate    Encounter for screening for depression    Anemia, unspecified        Immunizations:     SARS-COV-2 (COVID-19) vaccine, UNSPECIFIED 4/1/2021        Current Medications:     Last Reviewed on 5/06/2021 12:24 PM by Duane Sinclair    aspirin 81 mg oral tablet, delayed release (enteric coated) [take 1 tablet (81 mg) by oral route once daily]    AMLODIPINE BESYLATE 5 MG TAB  [TAKE 1  "TABLET BY MOUTH EVERY DAY]    atorvastatin 40 mg oral tablet [TAKE 1 TABLET BY MOUTH EVERY DAY]    LOSARTAN POTASSIUM 100 MG TAB  [TAKE 1 TABLET BY MOUTH EVERY DAY]    METFORMIN HCL 1,000 MG TABLET  [TAKE 1 TABLET BY MOUTH TWICE A DAY WITH MEALS]    OMEPRAZOLE DR 40 MG CAPSULE  [TAKE 1 CAPSULE BY MOUTH DAILY AS NEEDED (GERD).]    HYDROCHLOROTHIAZIDE 25 MG TAB  [TAKE 1 TABLET BY MOUTH EVERY DAY]    Trulicity 1.5 mg/0.5 mL subcutaneous Pen Injector [inject 0.5 milliliter (1.5 mg) by subcutaneous route every 7 days]        Objective:        Vitals:         Current: 5/6/2021 9:32:42 AM    Ht:  6 ft, 2 in;  Wt: 317.2 lbs;  BMI: 40.7T: 97.6 F (temporal);  BP: 162/82 mm Hg (left arm, sitting);  P: 84 bpm (left arm (BP Cuff), sitting);  sCr: 1 mg/dL;  GFR: 99.64        Exams:     PHYSICAL EXAM:     GENERAL: Vitals recorded well developed, well nourished;  no apparent distress;     EYES: conjunctiva and cornea are normal;     E/N/T:  normal EACs, TMs, nasal/oral mucosa, teeth, gingiva, and oropharynx;     NECK: trachea is midline; thyroid is non-palpable;     RESPIRATORY: Clear to auscultation bilateally; no rales (\"crackles\") present; no rhonchi; no wheezes;     CARDIOVASCULAR: normal rate; rhythm is regular;  No murmurs, clicks, gallops or rubs appreciated; no edema;     GASTROINTESTINAL: nontender; Soft and nondistended; normal bowel sounds; no organomegaly; no masses;     LYMPHATIC: no enlargement of cervical or facial nodes; no supraclavicular nodes;     SKIN:  No significant rashes, lesions or suspicious moles within limits of examination;     MUSCULOSKELETAL: muscle strength: 5/5 in all major muscle groups;  normal overall tone     NEUROLOGIC: Grossly intact; mental status: alert and oriented x 3;     PSYCHIATRIC: appropriate affect and demeanor; normal speech pattern; Normal behavior;         Assessment:         E11.9   Type 2 diabetes mellitus without complications       E78.5   Hyperlipidemia, unspecified       I10 "   Essential (primary) hypertension       K21.9   Gastro-esophageal reflux disease without esophagitis       R07.9   Chest pain, unspecified       D64.9   Anemia, unspecified           ORDERS:         Meds Prescribed:       [Refilled] amLODIPine 10 mg oral tablet [TAKE 1 TABLET BY MOUTH EVERY DAY], #90 (ninety) tablets, Refills: 0 (zero)                 Plan:         Type 2 diabetes mellitus without complications- Not controlled.  Continue Metformin 1000 g twice daily and Trulicity 1.5 mg weekly.             Hyperlipidemia, unspecified- Continue atorvastatin 40 mg daily        Essential (primary) hypertension- Not controlled. Increase amlodipine to 10 mg daily.  Continue HCTZ 25 mg daily and losartan 100 mg daily          Prescriptions:       [Refilled] amLODIPine 10 mg oral tablet [TAKE 1 TABLET BY MOUTH EVERY DAY], #90 (ninety) tablets, Refills: 0 (zero)         Gastro-esophageal reflux disease without esophagitis- Stable.  Continue omeprazole 40 mg daily.         Chest pain, unspecified-  Cardiology appointment scheduled for 6/6 given EKG that shows left anterior fascicular block and right bundle branch block.  We have no prior EKGs to compare to but with these changes in the setting of chest pain, albeit unrelated to exertion, we will still have him see cardiology to see if they would like to stress test this patient with known risk factors (family history, diabetes, HLD and hypertension).        Anemia, unspecified- Iron deficiency anemia.  Most recent hemoglobin 10.4.  Will repeat at next visit.  ED/return precautions given.  Referral placed for colonoscopy.  Advised to start daily iron supplementation.            Charge Capture:         Primary Diagnosis:     E11.9  Type 2 diabetes mellitus without complications           Orders:      [id number]  Office/outpatient visit; established patient, level 4  (In-House)              E78.5  Hyperlipidemia, unspecified     I10  Essential (primary) hypertension     K21.9   Gastro-esophageal reflux disease without esophagitis     R07.9  Chest pain, unspecified     D64.9  Anemia, unspecified

## 2021-07-02 VITALS
BODY MASS INDEX: 40.43 KG/M2 | DIASTOLIC BLOOD PRESSURE: 82 MMHG | HEART RATE: 84 BPM | WEIGHT: 315 LBS | HEIGHT: 74 IN | TEMPERATURE: 97.6 F | SYSTOLIC BLOOD PRESSURE: 162 MMHG

## 2021-07-02 VITALS
HEIGHT: 74 IN | BODY MASS INDEX: 40.43 KG/M2 | WEIGHT: 315 LBS | SYSTOLIC BLOOD PRESSURE: 136 MMHG | DIASTOLIC BLOOD PRESSURE: 82 MMHG | TEMPERATURE: 98.4 F | HEART RATE: 92 BPM

## 2021-07-02 VITALS
HEART RATE: 75 BPM | SYSTOLIC BLOOD PRESSURE: 142 MMHG | HEIGHT: 74 IN | WEIGHT: 315 LBS | TEMPERATURE: 97.2 F | BODY MASS INDEX: 40.43 KG/M2 | DIASTOLIC BLOOD PRESSURE: 73 MMHG

## 2021-07-06 ENCOUNTER — OFFICE VISIT (OUTPATIENT)
Dept: CARDIOLOGY | Facility: CLINIC | Age: 61
End: 2021-07-06

## 2021-07-06 VITALS
HEART RATE: 79 BPM | WEIGHT: 312 LBS | SYSTOLIC BLOOD PRESSURE: 132 MMHG | DIASTOLIC BLOOD PRESSURE: 64 MMHG | BODY MASS INDEX: 40.04 KG/M2 | HEIGHT: 74 IN

## 2021-07-06 DIAGNOSIS — R60.0 BILATERAL LEG EDEMA: ICD-10-CM

## 2021-07-06 DIAGNOSIS — I20.0 UNSTABLE ANGINA (HCC): Primary | ICD-10-CM

## 2021-07-06 PROCEDURE — 99204 OFFICE O/P NEW MOD 45 MIN: CPT | Performed by: INTERNAL MEDICINE

## 2021-07-06 RX ORDER — DULAGLUTIDE 0.75 MG/.5ML
0.5 INJECTION, SOLUTION SUBCUTANEOUS
COMMUNITY
End: 2021-07-20 | Stop reason: DRUGHIGH

## 2021-07-07 NOTE — PROGRESS NOTES
CARDIOLOGY INITIAL CONSULT       Chief Complaint  Chest Pain (new patient.) and Shortness of Breath    Subjective            Cristino Johns presents to [facility] CARDIOLOGY  History of Present Illness  This is a 60-year-old male with diabetes mellitus, hypertension, hyperlipidemia who was referred for cardiac evaluation because of chest pain and shortness of breath.  Symptoms are going on for the past several weeks.  The chest pain is described as a heaviness on the left side of the chest and at times on the right side, mostly related to activities.  Symptoms can happen at rest as well.  He also noticed increasing shortness of breath on moderate exertion along with swelling of both feet.  EKG was performed at PCP office which was abnormal.  Cardiac evaluation was requested.    Patient denies having history of any cardiac illness.  No recent cardiac work-up available other than EKGs.       Past History:    Medical History: has a past medical history of Diabetes (CMS/[facility]), GERD (gastroesophageal reflux disease), Hyperlipidemia, and Hypertension.     Surgical History: has a past surgical history that includes Vascular surgery.     Family History: family history includes Cancer in his mother; Diabetes in his mother; Hypertension in his mother.  No family history of premature coronary artery disease.    Social History: reports that he quit smoking about 6 years ago. He has never used smokeless tobacco. He reports current alcohol use. He reports that he does not use drugs.    Allergies: Patient has no known allergies.    Current Outpatient Medications on File Prior to Visit   Medication Sig   • amLODIPine (NORVASC) 10 MG tablet Take 10 mg by mouth Daily.   • aspirin 81 MG EC tablet Take 81 mg by mouth Daily.   • atorvastatin (LIPITOR) 40 MG tablet Take 40 mg by mouth Daily.   • Canagliflozin (INVOKANA) 300 MG tablet Take 300 mg by mouth Daily.   • Dulaglutide (Trulicity) 0.75 MG/0.5ML solution  "pen-injector 0.5 mL.   • hydrochlorothiazide (HYDRODIURIL) 25 MG tablet Take 25 mg by mouth Daily.   • lisinopril (PRINIVIL,ZESTRIL) 5 MG tablet Take 5 mg by mouth Daily.   • metFORMIN (GLUCOPHAGE) 1000 MG tablet Take 1,000 mg by mouth 2 (Two) Times a Day With Meals.   • omeprazole (priLOSEC) 40 MG capsule Take 40 mg by mouth Daily.     No current facility-administered medications on file prior to visit.          Review of Systems   Constitutional: Negative for fatigue, unexpected weight gain and unexpected weight loss.   Eyes: Negative for double vision.   Respiratory: Positive for shortness of breath. Negative for cough and wheezing.    Cardiovascular: Positive for chest pain and leg swelling. Negative for palpitations.   Gastrointestinal: Negative for abdominal pain, nausea and vomiting.   Endocrine: Negative for cold intolerance, heat intolerance, polydipsia and polyuria.   Musculoskeletal: Negative for arthralgias and back pain.   Skin: Negative for color change.   Neurological: Negative for dizziness, syncope, weakness and headache.   Hematological: Does not bruise/bleed easily.        Objective     /64 (BP Location: Left arm, Patient Position: Sitting, Cuff Size: Adult)   Pulse 79   Ht 188 cm (74\")   Wt (!) 142 kg (312 lb)   BMI 40.06 kg/m²       Physical Exam  Constitutional:       General: He is awake. He is not in acute distress.     Appearance: Normal appearance.   Eyes:      Extraocular Movements: Extraocular movements intact.      Pupils: Pupils are equal, round, and reactive to light.   Neck:      Thyroid: No thyromegaly.      Vascular: No carotid bruit or JVD.   Cardiovascular:      Rate and Rhythm: Normal rate and regular rhythm.      Chest Wall: PMI is not displaced.      Heart sounds: Normal heart sounds, S1 normal and S2 normal. No murmur heard.   No friction rub. No gallop. No S3 or S4 sounds.    Pulmonary:      Effort: Pulmonary effort is normal. No respiratory distress.      Breath " sounds: Normal breath sounds. No wheezing, rhonchi or rales.   Abdominal:      General: Bowel sounds are normal.      Palpations: Abdomen is soft.      Tenderness: There is no abdominal tenderness.   Musculoskeletal:      Cervical back: Neck supple.      Right lower leg: Edema (1+ pitting pedal edema bilaterally.) present.      Left lower leg: Edema present.   Skin:     Findings: No rash.   Neurological:      General: No focal deficit present.      Mental Status: He is alert and oriented to person, place, and time.           Result Review :     The following data was reviewed by: Ajay Magallon MD on 07/06/2021:    CMP    CMP 7/28/20 4/2/21   Glucose 103 (A) 140 (A)   BUN 10 12   Creatinine 0.9 1.00   Sodium 140 137   Potassium 4.3 4.0   Chloride 98 95 (A)   Calcium 9.8 9.3   Albumin 4.1 4.2   Total Bilirubin 0.6 0.63   Alkaline Phosphatase 101 98   AST (SGOT) 22 20   ALT (SGPT) 25 20   (A) Abnormal value            CBC    CBC 7/28/20 4/2/21 4/19/21   WBC 11.32 (A) 11.07 (A) 10.37   RBC 5.23 4.87 4.35 (A)   Hemoglobin 12.5 (A) 11.70 (A) 10.40 (A)   Hematocrit 41.2 36.9 (A) 32.6 (A)   MCV 78.8 (A) 75.8 (A) 74.9 (A)   MCH 23.9 (A) 24.0 (A) 23.9 (A)   MCHC 30.3 (A) 31.7 (A) 31.9 (A)   RDW 14.8 14.5 14.1   Platelets 352 322.00 296.00   (A) Abnormal value            TSH    TSH 4/2/21   TSH 1.310           Lipid Panel    Lipid Panel 4/2/21   Total Cholesterol 109   Triglycerides 126   HDL Cholesterol 48   VLDL Cholesterol 25   LDL Cholesterol  36 (A)   (A) Abnormal value       Comments are available for some flowsheets but are not being displayed.            EKG done at PCP office showed sinus rhythm, right bundle branch block, abnormal EKG             Assessment and Plan        Diagnoses and all orders for this visit:    1. Unstable angina (CMS/HCC) (Primary)  -     Stress Test With Myocardial Perfusion One Day  -     Adult Transthoracic Echo Complete w/ Color, Spectral and Contrast if necessary per protocol;  Future    Symptoms are concerning for angina, which is new onset.  Also reports exertional shortness of breath.  He has multiple risk factor for coronary disease including diabetes and hyperlipidemia.  EKG shows right bundle branch block.  We will proceed with further evaluation including SPECT stress test to rule out reversible ischemia.  Patient will also need an echocardiogram to assess LV systolic and diastolic function and rule out significant valvular abnormalities.  Recommend to continue aspirin and statins for now    2. Bilateral leg edema  -     Adult Transthoracic Echo Complete w/ Color, Spectral and Contrast if necessary per protocol; Future    Echo as mentioned above      I spent 28 minutes caring for Cristino on this date of service. This time includes time spent by me in the following activities:reviewing tests, obtaining and/or reviewing a separately obtained history, performing a medically appropriate examination and/or evaluation , ordering medications, tests, or procedures, and documenting information in the medical record    Follow Up     Return will make f/i appointment after revewing echo and stress test reports.    Patient was given instructions and counseling regarding his condition or for health maintenance advice. Please see specific information pulled into the AVS if appropriate.

## 2021-07-15 ENCOUNTER — TELEPHONE (OUTPATIENT)
Dept: CARDIOLOGY | Facility: CLINIC | Age: 61
End: 2021-07-15

## 2021-07-15 NOTE — TELEPHONE ENCOUNTER
L/M for patient to cancel echo appointment today in [place] office due to unavailable tech.  Asked patient to call office to reschedule.

## 2021-07-21 ENCOUNTER — HOSPITAL ENCOUNTER (OUTPATIENT)
Dept: NUCLEAR MEDICINE | Facility: HOSPITAL | Age: 61
Discharge: HOME | End: 2021-07-21

## 2021-07-21 PROCEDURE — 78452 HT MUSCLE IMAGE SPECT MULT: CPT

## 2021-07-21 PROCEDURE — A9502 TC99M TETROFOSMIN: HCPCS | Performed by: INTERNAL MEDICINE

## 2021-07-21 PROCEDURE — 25010000002 REGADENOSON 0.4 MG/5ML SOLUTION: Performed by: INTERNAL MEDICINE

## 2021-07-21 PROCEDURE — 93018 CV STRESS TEST I&R ONLY: CPT | Performed by: INTERNAL MEDICINE

## 2021-07-21 PROCEDURE — 0 TECHNETIUM TETROFOSMIN KIT: Performed by: INTERNAL MEDICINE

## 2021-07-21 PROCEDURE — 93016 CV STRESS TEST SUPVJ ONLY: CPT | Performed by: NURSE PRACTITIONER

## 2021-07-21 PROCEDURE — 78452 HT MUSCLE IMAGE SPECT MULT: CPT | Performed by: INTERNAL MEDICINE

## 2021-07-21 PROCEDURE — 93017 CV STRESS TEST TRACING ONLY: CPT

## 2021-07-21 RX ORDER — LOSARTAN POTASSIUM 50 MG/1
100 TABLET ORAL DAILY
COMMUNITY
End: 2021-08-06

## 2021-07-21 RX ADMIN — TETROFOSMIN 1 DOSE: 1.38 INJECTION, POWDER, LYOPHILIZED, FOR SOLUTION INTRAVENOUS at 07:53

## 2021-07-21 RX ADMIN — REGADENOSON 0.4 MG: 0.08 INJECTION, SOLUTION INTRAVENOUS at 09:56

## 2021-07-21 RX ADMIN — TETROFOSMIN 1 DOSE: 1.38 INJECTION, POWDER, LYOPHILIZED, FOR SOLUTION INTRAVENOUS at 09:56

## 2021-07-22 LAB
BH CV IMMEDIATE POST RECOVERY TECH DATA SYMPTOMS: NORMAL
BH CV IMMEDIATE POST TECH DATA BLOOD PRESSURE: NORMAL MMHG
BH CV IMMEDIATE POST TECH DATA HEART RATE: 88 BPM
BH CV IMMEDIATE POST TECH DATA OXYGEN SATS: 98 %
BH CV NUCLEAR PRIOR STUDY: 2
BH CV REST NUCLEAR ISOTOPE DOSE: 10 MCI
BH CV SIX MINUTE RECOVERY TECH DATA BLOOD PRESSURE: NORMAL
BH CV SIX MINUTE RECOVERY TECH DATA HEART RATE: 80 BPM
BH CV SIX MINUTE RECOVERY TECH DATA OXYGEN SATURATION: 97 %
BH CV SIX MINUTE RECOVERY TECH DATA SYMPTOMS: NORMAL
BH CV STRESS BP STAGE 1: NORMAL
BH CV STRESS COMMENTS STAGE 1: NORMAL
BH CV STRESS DOSE REGADENOSON STAGE 1: 0.4
BH CV STRESS DURATION MIN STAGE 1: 0
BH CV STRESS DURATION SEC STAGE 1: 10
BH CV STRESS HR STAGE 1: 74
BH CV STRESS NUCLEAR ISOTOPE DOSE: 34.3 MCI
BH CV STRESS O2 STAGE 1: 97
BH CV STRESS PROTOCOL 1: NORMAL
BH CV STRESS RECOVERY BP: NORMAL MMHG
BH CV STRESS RECOVERY HR: 80 BPM
BH CV STRESS RECOVERY O2: 97 %
BH CV STRESS STAGE 1: 1
BH CV THREE MINUTE POST TECH DATA BLOOD PRESSURE: NORMAL MMHG
BH CV THREE MINUTE POST TECH DATA HEART RATE: 83 BPM
BH CV THREE MINUTE POST TECH DATA OXYGEN SATURATION: 98 %
BH CV THREE MINUTE RECOVERY TECH DATA SYMPTOM: NORMAL
LV EF NUC BP: 54 %
MAXIMAL PREDICTED HEART RATE: 159 BPM
PERCENT MAX PREDICTED HR: 55.35 %
STRESS BASELINE BP: NORMAL MMHG
STRESS BASELINE HR: 74 BPM
STRESS O2 SAT REST: 97 %
STRESS PERCENT HR: 65 %
STRESS POST O2 SAT PEAK: 96 %
STRESS POST PEAK BP: NORMAL MMHG
STRESS POST PEAK HR: 88 BPM
STRESS TARGET HR: 135 BPM

## 2021-07-23 ENCOUNTER — TELEPHONE (OUTPATIENT)
Dept: CARDIOLOGY | Facility: CLINIC | Age: 61
End: 2021-07-23

## 2021-07-23 NOTE — TELEPHONE ENCOUNTER
Called and informed patient of echo and stress results.  Follow up appt made.    Patient reports he is still having swelling in his BLE daily.

## 2021-07-23 NOTE — TELEPHONE ENCOUNTER
----- Message from Ajay Magallon MD sent at 7/22/2021  5:06 PM EDT -----    ECHO : Normal study, heart function is normal no valve problems.     Stress test : The study did not show any evidence of decreased blood supply or blockages of the arteries of the heart.      Recommend a 3 to 4-month follow-up.  Please call us back for any recurrent or worsening symptoms in the meantime  Continue aspirin, statins.

## 2021-07-26 RX ORDER — FUROSEMIDE 20 MG/1
20 TABLET ORAL DAILY
Qty: 90 TABLET | Refills: 1 | Status: SHIPPED | OUTPATIENT
Start: 2021-07-26 | End: 2021-11-23 | Stop reason: SDUPTHER

## 2021-07-26 NOTE — TELEPHONE ENCOUNTER
Called and spoke with patient.  Informed him of new medication.  He verbalized understanding.  RX sent for provider review.

## 2021-08-06 ENCOUNTER — OFFICE VISIT (OUTPATIENT)
Dept: FAMILY MEDICINE CLINIC | Age: 61
End: 2021-08-06

## 2021-08-06 ENCOUNTER — LAB (OUTPATIENT)
Dept: LAB | Facility: HOSPITAL | Age: 61
End: 2021-08-06

## 2021-08-06 VITALS
HEART RATE: 68 BPM | HEIGHT: 74 IN | SYSTOLIC BLOOD PRESSURE: 138 MMHG | WEIGHT: 315 LBS | DIASTOLIC BLOOD PRESSURE: 76 MMHG | BODY MASS INDEX: 40.43 KG/M2

## 2021-08-06 DIAGNOSIS — E78.5 HYPERLIPIDEMIA, UNSPECIFIED HYPERLIPIDEMIA TYPE: ICD-10-CM

## 2021-08-06 DIAGNOSIS — D50.9 IRON DEFICIENCY ANEMIA, UNSPECIFIED IRON DEFICIENCY ANEMIA TYPE: ICD-10-CM

## 2021-08-06 DIAGNOSIS — E11.9 TYPE 2 DIABETES MELLITUS WITHOUT COMPLICATION, WITHOUT LONG-TERM CURRENT USE OF INSULIN (HCC): ICD-10-CM

## 2021-08-06 DIAGNOSIS — K21.9 GASTROESOPHAGEAL REFLUX DISEASE WITHOUT ESOPHAGITIS: ICD-10-CM

## 2021-08-06 DIAGNOSIS — I10 ESSENTIAL HYPERTENSION: ICD-10-CM

## 2021-08-06 DIAGNOSIS — D50.9 IRON DEFICIENCY ANEMIA, UNSPECIFIED IRON DEFICIENCY ANEMIA TYPE: Primary | ICD-10-CM

## 2021-08-06 PROBLEM — G47.30 SLEEP APNEA: Status: ACTIVE | Noted: 2017-03-17

## 2021-08-06 LAB
BASOPHILS # BLD AUTO: 0.05 10*3/MM3 (ref 0–0.2)
BASOPHILS NFR BLD AUTO: 0.6 % (ref 0–1.5)
DEPRECATED RDW RBC AUTO: 40.4 FL (ref 37–54)
EOSINOPHIL # BLD AUTO: 0.16 10*3/MM3 (ref 0–0.4)
EOSINOPHIL NFR BLD AUTO: 1.8 % (ref 0.3–6.2)
ERYTHROCYTE [DISTWIDTH] IN BLOOD BY AUTOMATED COUNT: 14.9 % (ref 12.3–15.4)
HBA1C MFR BLD: 7.69 % (ref 4.8–5.6)
HCT VFR BLD AUTO: 35.3 % (ref 37.5–51)
HGB BLD-MCNC: 11.4 G/DL (ref 13–17.7)
IMM GRANULOCYTES # BLD AUTO: 0.01 10*3/MM3 (ref 0–0.05)
IMM GRANULOCYTES NFR BLD AUTO: 0.1 % (ref 0–0.5)
LYMPHOCYTES # BLD AUTO: 1.6 10*3/MM3 (ref 0.7–3.1)
LYMPHOCYTES NFR BLD AUTO: 18.3 % (ref 19.6–45.3)
MCH RBC QN AUTO: 23.8 PG (ref 26.6–33)
MCHC RBC AUTO-ENTMCNC: 32.3 G/DL (ref 31.5–35.7)
MCV RBC AUTO: 73.8 FL (ref 79–97)
MONOCYTES # BLD AUTO: 0.55 10*3/MM3 (ref 0.1–0.9)
MONOCYTES NFR BLD AUTO: 6.3 % (ref 5–12)
NEUTROPHILS NFR BLD AUTO: 6.38 10*3/MM3 (ref 1.7–7)
NEUTROPHILS NFR BLD AUTO: 72.9 % (ref 42.7–76)
PLATELET # BLD AUTO: 298 10*3/MM3 (ref 140–450)
PMV BLD AUTO: 9.6 FL (ref 6–12)
RBC # BLD AUTO: 4.78 10*6/MM3 (ref 4.14–5.8)
WBC # BLD AUTO: 8.75 10*3/MM3 (ref 3.4–10.8)

## 2021-08-06 PROCEDURE — 85025 COMPLETE CBC W/AUTO DIFF WBC: CPT

## 2021-08-06 PROCEDURE — 99214 OFFICE O/P EST MOD 30 MIN: CPT | Performed by: FAMILY MEDICINE

## 2021-08-06 PROCEDURE — 83036 HEMOGLOBIN GLYCOSYLATED A1C: CPT

## 2021-08-06 PROCEDURE — 36415 COLL VENOUS BLD VENIPUNCTURE: CPT

## 2021-08-06 RX ORDER — AMLODIPINE BESYLATE 5 MG/1
5 TABLET ORAL DAILY
COMMUNITY
Start: 2021-06-22 | End: 2021-11-23 | Stop reason: SDUPTHER

## 2021-08-06 RX ORDER — LOSARTAN POTASSIUM 100 MG/1
100 TABLET ORAL DAILY
COMMUNITY
Start: 2021-06-23 | End: 2021-11-23 | Stop reason: SDUPTHER

## 2021-08-06 NOTE — ASSESSMENT & PLAN NOTE
Not controlled. Continue Trulicity 1.5 mg weekly, Invokana 3 mg daily and Metformin 1000 mg twice daily. A1c ordered today. Will adjust regimen if necessary.

## 2021-08-06 NOTE — PROGRESS NOTES
[facility]  Office Visit Note    Cristino Johns presents to [facility]  Encounter Date: 08/06/2021     Chief Complaint  Hypertension (Follow up), Diabetes (Follow up), and Hyperlipidemia (Follow up)    Subjective    History of Present Illness:    1.  Hyperlipidemia: Pertaining to hyperlipidemia, Cristino takes Lipitor 40 mg daily. His most recent lipid panel 4/2/2021 showed good control.  2.  Hypertension: Pertaining to hypertension, Cristino reports that his blood pressure has been controlled. His current regimen includes HCTZ 25 mg daily, amlodipine 5 mg daily and losartan 100 mg daily.  3.  Diabetes: Pertaining to diabetes, Cristino reports that his blood sugar has been stable. His most recent A1c was 8.6% on 4/2/2021. His current regimen includes Metformin 1000 g twice daily, Invokana 300 mg daily and Trulicity 1.5 mg weekly.  4.  GERD: Pertaining to GERD, Cristino reportst that his symptoms have been flared recently. He notes that he gets upper abdominal pain/lower chest pain after eating. His current regimen includes omeprazole 40 mg daily. He denies dysphagia,  nausea, vomiting, diarrhea, melena or hematochezia.  5   Iron deficiency anemia: Cristino was recently found to have iron deficiency anemia. He has been referred to gastroenterology for evaluation and has this appointment upcoming next Tuesday. His most recent hemoglobin was 10.4. He denies easy bruising or bleeding. No melena or hematochezia.    Review of Systems:  Review of Systems   Constitutional: Negative for chills, fatigue, fever and unexpected weight loss.   HENT: Negative for trouble swallowing.    Eyes: Negative for blurred vision.   Respiratory: Negative for cough and shortness of breath.    Cardiovascular: Negative for chest pain, palpitations and leg swelling.   Gastrointestinal: Positive for GERD. Negative for abdominal pain, blood in stool, constipation, diarrhea, nausea and vomiting.  "  Endocrine: Negative for cold intolerance, heat intolerance, polydipsia, polyphagia and polyuria.   Neurological: Negative for dizziness, weakness, numbness and headache.   Psychiatric/Behavioral: Negative for sleep disturbance, suicidal ideas and depressed mood. The patient is not nervous/anxious.         Objective   Vital Signs:  /76 (BP Location: Left arm, Patient Position: Sitting, Cuff Size: Large Adult)   Pulse 68   Ht 188 cm (74\")   Wt (!) 143 kg (315 lb 6.4 oz)   BMI 40.49 kg/m²      Physical Examination:  Physical Exam  Vitals reviewed.   Constitutional:       General: He is not in acute distress.     Appearance: Normal appearance.   HENT:      Head: Normocephalic and atraumatic.   Eyes:      Conjunctiva/sclera: Conjunctivae normal.   Cardiovascular:      Rate and Rhythm: Normal rate and regular rhythm.      Heart sounds: No murmur heard.     Pulmonary:      Effort: Pulmonary effort is normal. No respiratory distress.      Breath sounds: Normal breath sounds.   Musculoskeletal:      Right lower leg: No edema.      Left lower leg: No edema.   Skin:     General: Skin is warm and dry.      Findings: No rash.   Neurological:      General: No focal deficit present.      Mental Status: He is alert and oriented to person, place, and time.   Psychiatric:         Mood and Affect: Mood normal.         Behavior: Behavior normal.         Result Review   The following data was reviewed by: Duane Sinclair MD on 08/06/2021:  CBC & Differential (04/02/2021 14:06)  Diabetes Panel 2 (04/02/2021 14:06)  PSA Screen (04/02/2021 14:06)  TSH (04/02/2021 14:06)  CBC & Differential (04/19/2021 12:08)  Ferritin (04/19/2021 12:08)  Iron Profile (04/19/2021 12:08)    Procedures:    No procedures associated with this visit.     Assessment and Plan:  Diagnoses and all orders for this visit:    1. Iron deficiency anemia, unspecified iron deficiency anemia type (Primary)  Assessment & Plan:  Repeat CBC ordered today. Unclear " etiology but this is favored to be secondary to chronic GI bleeding. Establish with GI as directed.    Orders:  -     CBC w AUTO Differential; Future    2. Type 2 diabetes mellitus without complication, without long-term current use of insulin (CMS/[facility])  Assessment & Plan:  Not controlled. Continue Trulicity 1.5 mg weekly, Invokana 3 mg daily and Metformin 1000 mg twice daily. A1c ordered today. Will adjust regimen if necessary.    Orders:  -     Hemoglobin A1c; Future    3. Hyperlipidemia, unspecified hyperlipidemia type  Assessment & Plan:  Stable. Continue Lipitor 40 mg daily.      4. Essential hypertension  Assessment & Plan:  Stable. Continue HCTZ 25 mg daily, amlodipine 5 mg daily and losartan 100 mg daily.      5. Gastroesophageal reflux disease without esophagitis  Assessment & Plan:  Not controlled. Start Pepcid 20 mg twice daily. Continue omeprazole 40 mg daily. Establish with GI as scheduled.        Return in about 3 months (around 11/6/2021).    Patient was given instructions and counseling regarding his condition or for health maintenance advice. Please see specific information pulled into the AVS if appropriate.      Duane Sinclair MD   8/6/2021

## 2021-08-06 NOTE — ASSESSMENT & PLAN NOTE
Not controlled. Start Pepcid 20 mg twice daily. Continue omeprazole 40 mg daily. Establish with GI as scheduled.

## 2021-08-06 NOTE — ASSESSMENT & PLAN NOTE
Repeat CBC ordered today. Unclear etiology but this is favored to be secondary to chronic GI bleeding. Establish with GI as directed.

## 2021-08-10 ENCOUNTER — OFFICE VISIT (OUTPATIENT)
Dept: GASTROENTEROLOGY | Facility: CLINIC | Age: 61
End: 2021-08-10

## 2021-08-10 ENCOUNTER — PREP FOR SURGERY (OUTPATIENT)
Dept: OTHER | Facility: HOSPITAL | Age: 61
End: 2021-08-10

## 2021-08-10 VITALS
HEIGHT: 74 IN | DIASTOLIC BLOOD PRESSURE: 84 MMHG | HEART RATE: 71 BPM | BODY MASS INDEX: 39.76 KG/M2 | WEIGHT: 309.8 LBS | OXYGEN SATURATION: 98 % | SYSTOLIC BLOOD PRESSURE: 155 MMHG

## 2021-08-10 DIAGNOSIS — R12 HEARTBURN: ICD-10-CM

## 2021-08-10 DIAGNOSIS — D50.9 IRON DEFICIENCY ANEMIA, UNSPECIFIED IRON DEFICIENCY ANEMIA TYPE: ICD-10-CM

## 2021-08-10 DIAGNOSIS — Z12.11 ENCOUNTER FOR SCREENING FOR MALIGNANT NEOPLASM OF COLON: ICD-10-CM

## 2021-08-10 DIAGNOSIS — R10.84 GENERALIZED ABDOMINAL PAIN: Primary | ICD-10-CM

## 2021-08-10 DIAGNOSIS — K21.9 GASTROESOPHAGEAL REFLUX DISEASE, UNSPECIFIED WHETHER ESOPHAGITIS PRESENT: ICD-10-CM

## 2021-08-10 DIAGNOSIS — R13.12 OROPHARYNGEAL DYSPHAGIA: ICD-10-CM

## 2021-08-10 PROCEDURE — 99214 OFFICE O/P EST MOD 30 MIN: CPT | Performed by: NURSE PRACTITIONER

## 2021-08-10 RX ORDER — DULAGLUTIDE 0.75 MG/.5ML
75 INJECTION, SOLUTION SUBCUTANEOUS WEEKLY
COMMUNITY
End: 2021-11-22 | Stop reason: SDUPTHER

## 2021-08-10 RX ORDER — PEG-3350, SODIUM SULFATE, SODIUM CHLORIDE, POTASSIUM CHLORIDE, SODIUM ASCORBATE AND ASCORBIC ACID 7.5-2.691G
KIT ORAL
Qty: 1 EACH | Refills: 0 | Status: SHIPPED | OUTPATIENT
Start: 2021-08-10 | End: 2021-08-10 | Stop reason: CLARIF

## 2021-08-10 RX ORDER — FAMOTIDINE 20 MG/1
20 TABLET, FILM COATED ORAL
Qty: 30 TABLET | Refills: 2 | Status: SHIPPED | OUTPATIENT
Start: 2021-08-10 | End: 2021-11-23 | Stop reason: ALTCHOICE

## 2021-08-10 NOTE — PATIENT INSTRUCTIONS
Food Choices for Gastroesophageal Reflux Disease, Adult  When you have gastroesophageal reflux disease (GERD), the foods you eat and your eating habits are very important. Choosing the right foods can help ease your discomfort. Think about working with a food expert (dietitian) to help you make good choices.  What are tips for following this plan?  Reading food labels  · Read the label for foods that are low in saturated fat. Foods that may help with your symptoms include:  ? Foods that have less than 5% of daily value (DV) of fat.  ? Foods that have 0 grams of trans fat.  Cooking  · Do not sánchez your food. Cook your food by baking, steaming, grilling, or broiling. These are all methods that do not need a lot of fat for cooking.  · To add flavor, try to use herbs that are low in spice and acidity.  Meal planning    · Choose healthy foods that are low in fat, such as:  ? Fruits and vegetables.  ? Whole grains.  ? Low-fat dairy products.  ? Lean meats, fish, and poultry.  · Eat small meals often instead of eating 3 large meals each day. Eat your meals slowly in a place where you are relaxed. Avoid bending over or lying down until 2-3 hours after eating.  · Limit high-fat foods such as fatty meats or fried foods.  · Limit your intake of oils, butter, and shortening to less than 8 teaspoons each day.  · Avoid the following:  ? Foods that cause symptoms. These may be different for different people. Keep a food diary to keep track of foods that cause symptoms.  ? Alcohol.  ? Drinking a lot of liquid with meals.  ? Eating meals during the 2-3 hours before bed.  Lifestyle  · Stay at a healthy weight. Ask your doctor what weight is healthy for you. If you need to lose weight, work with your doctor to do so safely.  · Exercise for at least 30 minutes on 5 or more days each week, or as told by your doctor.  · Wear loose-fitting clothes.  · Do not use any products that contain nicotine or tobacco, such as cigarettes,  e-cigarettes, and chewing tobacco. If you need help quitting, ask your doctor.  · Sleep with the head of your bed higher than your feet. Use a wedge under the mattress or blocks under the bed frame to raise the head of the bed.  What foods should eat?    Eat a healthy, well-balanced diet of fruits, vegetables, whole grains, low-fat dairy products, lean meats, fish, and poultry. Each person is different. Foods that may cause symptoms in one person may not cause any symptoms in another person. Work with your doctor to find foods that are safe for you.  The items listed above may not be a complete list of foods and beverages you can eat. Contact a dietitian for more information.  What foods should I avoid?  Limiting some of these foods may help in managing the symptoms of GERD. Everyone is different. Talk with a food expert or your doctor to help you find the exact foods to avoid, if any.  Fruits  Any fruits prepared with added fat. Any fruits that cause symptoms. For some people, this may include citrus fruits, such as oranges, grapefruit, pineapple, and mart.  Vegetables  Deep-fried vegetables. French fries. Any vegetables prepared with added fat. Any vegetables that cause symptoms. For some people, this may include tomatoes and tomato products, chili peppers, onions and garlic, and horseradish.  Grains  Pastries or quick breads with added fat.  Meats and other proteins  High-fat meats, such as fatty beef or pork, hot dogs, ribs, ham, sausage, salami, and gaona. Fried meat or protein, including fried fish and fried chicken. Nuts and nut butters.  Dairy  Whole milk and chocolate milk. Sour cream. Cream. Ice cream. Cream cheese. Milkshakes.  Fats and oils  Butter. Margarine. Shortening. Ghee.  Beverages  Coffee and tea, with or without caffeine. Carbonated beverages. Sodas. Energy drinks. Fruit juice made with acidic fruits (such as orange or grapefruit). Tomato juice. Alcoholic drinks.  Sweets and  desserts  Chocolate and cocoa. Donuts.  Seasonings and condiments  Pepper. Peppermint and spearmint. Added salt. Any condiments, herbs, or seasonings that cause symptoms. For some people, this may include styles, hot sauce, or vinegar-based salad dressings.  The items listed above may not be a complete list of foods and beverages you should avoid. Contact a dietitian for more information.  Questions to ask your doctor  Diet and lifestyle changes are often the first steps that are taken to manage symptoms of GERD. If diet and lifestyle changes do not help, talk with your doctor about taking medicines.  Where to find more information  · [employer]: aboutgerd.org  Summary  · When you have GERD, food and lifestyle choices are very important in easing your symptoms.  · Eat small meals often instead of 3 large meals a day. Eat your meals slowly and in a place where you are relaxed.  · Avoid bending over or lying down until 2-3 hours after eating.  · Limit high-fat foods such as fatty meat or fried foods.  This information is not intended to replace advice given to you by your health care provider. Make sure you discuss any questions you have with your health care provider.  Document Revised: 10/12/2020 Document Reviewed: 10/12/2020  Elsevier Patient Education © 2021 [employer]

## 2021-08-10 NOTE — PROGRESS NOTES
Chief Complaint  Heartburn and Abdominal Pain    Cristino Johns is a 60 y.o. male who presents to [facility] has a new patient  HPI  60-year-old male presenting to the office as a new patient with a history of heartburn, anemia and abdominal pain. Patient reports reflux and dysphagia for the past 1 month. He was having chest pain and stomach pain after eating. No correlation with foods that cause it to worsens. No time of day that is it worse. Currently on omeprazole 40 mg daily with previous relief but now worsening. Bowel movements are formed and having to strain to pass, he is having two a day. No melena or hematochezia.  No family history of colon cancer. Recently had a cardiac work up with  with normal results.  Patient reports that his last colonoscopy was over 20 years ago.  He has never had an EGD.  Patient denies fever, nausea, vomiting, weight loss, night sweats, melena, hematochezia, hematemesis.    Labs: 8/6/2021 WBC-8.75, RBC-4.78, hemoglobin 11.4, hematocrit 35.3, MCV-73.8, MCH-23.8  4/19/2021 iron profile iron-47, iron saturation 13    Result Review :   The following data was reviewed by: Ela Merchant NP on 08/10/2021     WBC   Date Value Ref Range Status   08/06/2021 8.75 3.40 - 10.80 10*3/mm3 Final   04/19/2021 10.37 4.80 - 10.80 10*3/uL Final   07/28/2020 11.32 (H) 4.5 - 11.0 10*3/uL Final     RBC   Date Value Ref Range Status   08/06/2021 4.78 4.14 - 5.80 10*6/mm3 Final   07/28/2020 5.23 4.5 - 5.9 10*6/uL Final     Hemoglobin   Date Value Ref Range Status   08/06/2021 11.4 (L) 13.0 - 17.7 g/dL Final   07/28/2020 12.5 (L) 13.5 - 17.5 g/dL Final     Hematocrit   Date Value Ref Range Status   08/06/2021 35.3 (L) 37.5 - 51.0 % Final   07/28/2020 41.2 41.0 - 53.0 % Final     MCV   Date Value Ref Range Status   08/06/2021 73.8 (L) 79.0 - 97.0 fL Final   07/28/2020 78.8 (L) 80.0 - 100.0 fL Final     MCH   Date Value Ref Range Status   08/06/2021 23.8 (L) 26.6 -  33.0 pg Final   07/28/2020 23.9 (L) 26.0 - 34.0 pg Final     MCHC   Date Value Ref Range Status   08/06/2021 32.3 31.5 - 35.7 g/dL Final   07/28/2020 30.3 (L) 31.0 - 37.0 g/dL Final     RDW   Date Value Ref Range Status   08/06/2021 14.9 12.3 - 15.4 % Final   07/28/2020 14.8 12.0 - 16.8 % Final     RDW-SD   Date Value Ref Range Status   08/06/2021 40.4 37.0 - 54.0 fl Final     MPV   Date Value Ref Range Status   08/06/2021 9.6 6.0 - 12.0 fL Final   07/28/2020 10.3 8.4 - 12.4 fL Final     Platelets   Date Value Ref Range Status   08/06/2021 298 140 - 450 10*3/mm3 Final   07/28/2020 352 140 - 440 10*3/uL Final     Neutrophil Rel %   Date Value Ref Range Status   07/28/2020 73.4 45 - 80 % Final     Neutrophil %   Date Value Ref Range Status   08/06/2021 72.9 42.7 - 76.0 % Final     Lymphocyte Rel %   Date Value Ref Range Status   07/28/2020 18.9 15 - 50 % Final     Lymphocyte %   Date Value Ref Range Status   08/06/2021 18.3 (L) 19.6 - 45.3 % Final     Monocyte Rel %   Date Value Ref Range Status   07/28/2020 5.4 0 - 15 % Final     Monocyte %   Date Value Ref Range Status   08/06/2021 6.3 5.0 - 12.0 % Final     Eosinophil %   Date Value Ref Range Status   08/06/2021 1.8 0.3 - 6.2 % Final   07/28/2020 1.3 0 - 7 % Final     Basophil Rel %   Date Value Ref Range Status   07/28/2020 0.7 0 - 2 % Final     Basophil %   Date Value Ref Range Status   08/06/2021 0.6 0.0 - 1.5 % Final     Immature Grans %   Date Value Ref Range Status   08/06/2021 0.1 0.0 - 0.5 % Final   07/28/2020 0.3 0.0 - 1.0 % Final     Neutrophils Absolute   Date Value Ref Range Status   07/28/2020 8.31 2.0 - 8.8 10*3/uL Final     Neutrophils, Absolute   Date Value Ref Range Status   08/06/2021 6.38 1.70 - 7.00 10*3/mm3 Final     Lymphocytes Absolute   Date Value Ref Range Status   07/28/2020 2.14 0.7 - 5.5 10*3/uL Final     Lymphocytes, Absolute   Date Value Ref Range Status   08/06/2021 1.60 0.70 - 3.10 10*3/mm3 Final     Monocytes Absolute   Date Value  Ref Range Status   07/28/2020 0.61 0.0 - 1.7 10*3/uL Final     Monocytes, Absolute   Date Value Ref Range Status   08/06/2021 0.55 0.10 - 0.90 10*3/mm3 Final     Eosinophils Absolute   Date Value Ref Range Status   07/28/2020 0.15 0.0 - 0.8 10*3/uL Final     Eosinophils, Absolute   Date Value Ref Range Status   08/06/2021 0.16 0.00 - 0.40 10*3/mm3 Final     Basophils Absolute   Date Value Ref Range Status   07/28/2020 0.08 0.0 - 0.2 10*3/uL Final     Basophils, Absolute   Date Value Ref Range Status   08/06/2021 0.05 0.00 - 0.20 10*3/mm3 Final     Immature Grans, Absolute   Date Value Ref Range Status   08/06/2021 0.01 0.00 - 0.05 10*3/mm3 Final   07/28/2020 0.03 0.00 - 0.10 10*3/uL Final     nRBC   Date Value Ref Range Status   07/28/2020 0 0 /100(WBC) Final                  Past Medical History:   Diagnosis Date   • Diabetes (CMS/HCC)    • GERD (gastroesophageal reflux disease)    • Hyperlipidemia    • Hypertension        Past Surgical History:   Procedure Laterality Date   • COLONOSCOPY     • VASCULAR SURGERY           Current Outpatient Medications:   •  amLODIPine (NORVASC) 5 MG tablet, Take 5 mg by mouth Daily., Disp: , Rfl:   •  aspirin 81 MG EC tablet, Take 81 mg by mouth Daily., Disp: , Rfl:   •  atorvastatin (LIPITOR) 40 MG tablet, Take 40 mg by mouth Daily., Disp: , Rfl:   •  Dulaglutide (Trulicity) 0.75 MG/0.5ML solution pen-injector, Inject 75 mg under the skin into the appropriate area as directed 1 (One) Time Per Week., Disp: , Rfl:   •  furosemide (Lasix) 20 MG tablet, Take 1 tablet by mouth Daily., Disp: 90 tablet, Rfl: 1  •  hydrochlorothiazide (HYDRODIURIL) 25 MG tablet, Take 25 mg by mouth Daily., Disp: , Rfl:   •  losartan (COZAAR) 100 MG tablet, Take 100 mg by mouth Daily., Disp: , Rfl:   •  metFORMIN (GLUCOPHAGE) 1000 MG tablet, Take 1,000 mg by mouth 2 (Two) Times a Day With Meals., Disp: , Rfl:   •  omeprazole (priLOSEC) 40 MG capsule, Take 40 mg by mouth Daily., Disp: , Rfl:   •  famotidine  "(Pepcid) 20 MG tablet, Take 1 tablet by mouth every night at bedtime., Disp: 30 tablet, Rfl: 2     Allergies   Allergen Reactions   • Latex Irritability       Family History   Problem Relation Age of Onset   • Hypertension Mother    • Cancer Mother    • Diabetes Mother         Social History     Social History Narrative   • Not on file       Objective     Vital Signs:   /84 (BP Location: Left arm, Patient Position: Sitting, Cuff Size: Adult)   Pulse 71   Ht 188 cm (74\")   Wt (!) 141 kg (309 lb 12.8 oz)   SpO2 98%   BMI 39.78 kg/m²     Body mass index is 39.78 kg/m².    Physical Exam  Constitutional:       General: He is not in acute distress.     Appearance: Normal appearance. He is well-developed and normal weight.   Eyes:      Conjunctiva/sclera: Conjunctivae normal.      Pupils: Pupils are equal, round, and reactive to light.      Visual Fields: Right eye visual fields normal and left eye visual fields normal.   Cardiovascular:      Rate and Rhythm: Normal rate and regular rhythm.      Heart sounds: Normal heart sounds.   Pulmonary:      Effort: Pulmonary effort is normal. No retractions.      Breath sounds: Normal breath sounds and air entry.      Comments: Inspection of chest: normal appearance  Abdominal:      General: Bowel sounds are normal.      Palpations: Abdomen is soft.      Tenderness: There is no abdominal tenderness.      Comments: No appreciable hepatosplenomegaly   Musculoskeletal:      Cervical back: Neck supple.      Right lower leg: No edema.      Left lower leg: No edema.   Lymphadenopathy:      Cervical: No cervical adenopathy.   Skin:     Findings: No lesion.      Comments: Turgor normal   Neurological:      Mental Status: He is alert and oriented to person, place, and time.   Psychiatric:         Mood and Affect: Mood and affect normal.                 Assessment and Plan    Diagnoses and all orders for this visit:    1. Generalized abdominal pain (Primary)    2. Heartburn    3. " Gastroesophageal reflux disease, unspecified whether esophagitis present    4. Iron deficiency anemia, unspecified iron deficiency anemia type    5. Encounter for screening for malignant neoplasm of colon    Other orders  -     famotidine (Pepcid) 20 MG tablet; Take 1 tablet by mouth every night at bedtime.  Dispense: 30 tablet; Refill: 2    60-year-old male presenting to the office as a new patient with a history of heartburn, anemia and abdominal pain.  With the patient's history of anemia, abdominal pain, reflux, last colonoscopy being 20 years ago I have recommended that the patient undergo further evaluation with an EGD and colonoscopy.  I have discussed this procedure in detail with the patient.  I have discussed the risks, benefits and alternatives.  I have discussed the risk of anesthesia, bleeding and perforation.  Patient understands these risks, benefits and alternatives and wishes to proceed.  I will schedule him at his earliest convenience. Patient will continue on Prilosec 40 mg daily we will add famotidine 20 mg at night to see if we get better control of his reflux.  Patient educated to hydrate well and eat a well-balanced diet.  We will follow up in the office after endoscopy.  Patient is agreeable to this plan.  Patient will call with any questions or concerns.              Follow Up   Return for Follow up after endoscopy in office.  Patient was given instructions and counseling regarding his condition or for health maintenance advice. Please see specific information pulled into the AVS if appropriate.

## 2021-09-13 ENCOUNTER — OFFICE VISIT (OUTPATIENT)
Dept: UROLOGY | Facility: CLINIC | Age: 61
End: 2021-09-13

## 2021-09-13 VITALS — RESPIRATION RATE: 14 BRPM | BODY MASS INDEX: 39.91 KG/M2 | WEIGHT: 311 LBS | HEIGHT: 74 IN

## 2021-09-13 DIAGNOSIS — R35.0 URINARY FREQUENCY: Primary | ICD-10-CM

## 2021-09-13 DIAGNOSIS — N40.0 BENIGN PROSTATIC HYPERPLASIA, UNSPECIFIED WHETHER LOWER URINARY TRACT SYMPTOMS PRESENT: ICD-10-CM

## 2021-09-13 LAB
BILIRUB BLD-MCNC: NEGATIVE MG/DL
CLARITY, POC: CLEAR
COLOR UR: YELLOW
GLUCOSE UR STRIP-MCNC: NEGATIVE MG/DL
KETONES UR QL: NEGATIVE
LEUKOCYTE EST, POC: NEGATIVE
NITRITE UR-MCNC: NEGATIVE MG/ML
PH UR: 5.5 [PH] (ref 5–8)
PROT UR STRIP-MCNC: NEGATIVE MG/DL
RBC # UR STRIP: NEGATIVE /UL
SP GR UR: 1.02 (ref 1–1.03)
UROBILINOGEN UR QL: NORMAL

## 2021-09-13 PROCEDURE — 99212 OFFICE O/P EST SF 10 MIN: CPT | Performed by: NURSE PRACTITIONER

## 2021-09-13 PROCEDURE — 81003 URINALYSIS AUTO W/O SCOPE: CPT | Performed by: NURSE PRACTITIONER

## 2021-09-13 RX ORDER — TAMSULOSIN HYDROCHLORIDE 0.4 MG/1
1 CAPSULE ORAL DAILY
Qty: 90 CAPSULE | Refills: 3 | Status: SHIPPED | OUTPATIENT
Start: 2021-09-13 | End: 2022-11-21

## 2021-09-13 NOTE — PROGRESS NOTES
"Chief Complaint: Urinary Urgency (Pt taking Flomax. Medication is helping)    Subjective         History of Present Illness  Cristino Johns is a 60 y.o. male presents to [facility] UR[facility] to be seen for 6 month f/u on urgency and frequency.    He was started tamsulosin 7 months ago and is doing well at this time.    He takes this \"from time to time\"      no straining to void.    Some urgency still.     Frequency is improved.     He has cut down of coffee intake and wishes to stay on Tamsulosin.     Objective     Past Medical History:   Diagnosis Date   • Diabetes (CMS/HCC)    • GERD (gastroesophageal reflux disease)    • Hyperlipidemia    • Hypertension        Past Surgical History:   Procedure Laterality Date   • COLONOSCOPY     • VASCULAR SURGERY           Current Outpatient Medications:   •  amLODIPine (NORVASC) 5 MG tablet, Take 5 mg by mouth Daily., Disp: , Rfl:   •  aspirin 81 MG EC tablet, Take 81 mg by mouth Daily., Disp: , Rfl:   •  atorvastatin (LIPITOR) 40 MG tablet, Take 40 mg by mouth Daily., Disp: , Rfl:   •  Dulaglutide (Trulicity) 0.75 MG/0.5ML solution pen-injector, Inject 75 mg under the skin into the appropriate area as directed 1 (One) Time Per Week., Disp: , Rfl:   •  famotidine (Pepcid) 20 MG tablet, Take 1 tablet by mouth every night at bedtime., Disp: 30 tablet, Rfl: 2  •  furosemide (Lasix) 20 MG tablet, Take 1 tablet by mouth Daily., Disp: 90 tablet, Rfl: 1  •  hydrochlorothiazide (HYDRODIURIL) 25 MG tablet, Take 25 mg by mouth Daily., Disp: , Rfl:   •  losartan (COZAAR) 100 MG tablet, Take 100 mg by mouth Daily., Disp: , Rfl:   •  metFORMIN (GLUCOPHAGE) 1000 MG tablet, Take 1,000 mg by mouth 2 (Two) Times a Day With Meals., Disp: , Rfl:   •  omeprazole (priLOSEC) 40 MG capsule, Take 40 mg by mouth Daily., Disp: , Rfl:   •  tamsulosin (FLOMAX) 0.4 MG capsule 24 hr capsule, Take 1 capsule by mouth Daily., Disp: 90 capsule, Rfl: 3    Allergies   Allergen Reactions   • Latex " "Irritability        Family History   Problem Relation Age of Onset   • Hypertension Mother    • Cancer Mother    • Diabetes Mother        Social History     Socioeconomic History   • Marital status:      Spouse name: Not on file   • Number of children: Not on file   • Years of education: Not on file   • Highest education level: Not on file   Tobacco Use   • Smoking status: Former Smoker     Packs/day: 1.50     Years: 30.00     Pack years: 45.00     Types: Cigarettes     Quit date: 2015     Years since quittin.1   • Smokeless tobacco: Never Used   Vaping Use   • Vaping Use: Never used   Substance and Sexual Activity   • Alcohol use: Yes     Comment: little, sometimes on the weekends.   • Drug use: Never   • Sexual activity: Defer       Vital Signs:   Resp 14   Ht 188 cm (74\")   Wt (!) 141 kg (311 lb)   BMI 39.93 kg/m²      Physical Exam     Result Review :   The following data was reviewed by: SHEFALI Lopez on 2021:  Results for orders placed or performed in visit on 21   POC Urinalysis Dipstick, Automated    Specimen: Urine   Result Value Ref Range    Color Yellow Yellow, Straw, Dark Yellow, Rhoda    Clarity, UA Clear Clear    Specific Gravity  1.020 1.005 - 1.030    pH, Urine 5.5 5.0 - 8.0    Leukocytes Negative Negative    Nitrite, UA Negative Negative    Protein, POC Negative Negative mg/dL    Glucose, UA Negative Negative, 1000 mg/dL (3+) mg/dL    Ketones, UA Negative Negative    Urobilinogen, UA Normal Normal    Bilirubin Negative Negative    Blood, UA Negative Negative      PSA    PSA 21   PSA 0.67 0.47               Procedures        Assessment and Plan    Diagnoses and all orders for this visit:    1. Urinary frequency (Primary)  -     POC Urinalysis Dipstick, Automated    2. Benign prostatic hyperplasia, unspecified whether lower urinary tract symptoms present  -     tamsulosin (FLOMAX) 0.4 MG capsule 24 hr capsule; Take 1 capsule by mouth Daily.  " Dispense: 90 capsule; Refill: 3      I spent 10 minutes caring for Cristino on this date of service. This time includes time spent by me in the following activities:reviewing tests, obtaining and/or reviewing a separately obtained history, performing a medically appropriate examination and/or evaluation , counseling and educating the patient/family/caregiver, ordering medications, tests, or procedures, and documenting information in the medical record  Follow Up   Return in about 1 year (around 9/13/2022), or if symptoms worsen or fail to improve, for f/u BPH .  Patient was given instructions and counseling regarding his condition or for health maintenance advice. Please see specific information pulled into the AVS if appropriate.         This document has been electronically signed by SHEFALI Lopez  September 13, 2021 09:00 EDT

## 2021-10-29 ENCOUNTER — ANESTHESIA (OUTPATIENT)
Dept: GASTROENTEROLOGY | Facility: HOSPITAL | Age: 61
End: 2021-10-29

## 2021-10-29 ENCOUNTER — HOSPITAL ENCOUNTER (OUTPATIENT)
Facility: HOSPITAL | Age: 61
Setting detail: HOSPITAL OUTPATIENT SURGERY
Discharge: HOME | End: 2021-10-29
Attending: INTERNAL MEDICINE | Admitting: INTERNAL MEDICINE

## 2021-10-29 ENCOUNTER — ANESTHESIA EVENT (OUTPATIENT)
Dept: GASTROENTEROLOGY | Facility: HOSPITAL | Age: 61
End: 2021-10-29

## 2021-10-29 VITALS
HEIGHT: 74 IN | DIASTOLIC BLOOD PRESSURE: 85 MMHG | RESPIRATION RATE: 19 BRPM | BODY MASS INDEX: 39.84 KG/M2 | OXYGEN SATURATION: 95 % | SYSTOLIC BLOOD PRESSURE: 114 MMHG | HEART RATE: 84 BPM | TEMPERATURE: 97 F | WEIGHT: 310.41 LBS

## 2021-10-29 DIAGNOSIS — D50.9 IRON DEFICIENCY ANEMIA, UNSPECIFIED IRON DEFICIENCY ANEMIA TYPE: ICD-10-CM

## 2021-10-29 DIAGNOSIS — R10.84 GENERALIZED ABDOMINAL PAIN: ICD-10-CM

## 2021-10-29 DIAGNOSIS — K21.9 GASTROESOPHAGEAL REFLUX DISEASE, UNSPECIFIED WHETHER ESOPHAGITIS PRESENT: ICD-10-CM

## 2021-10-29 DIAGNOSIS — R13.12 OROPHARYNGEAL DYSPHAGIA: ICD-10-CM

## 2021-10-29 PROCEDURE — 43239 EGD BIOPSY SINGLE/MULTIPLE: CPT | Performed by: INTERNAL MEDICINE

## 2021-10-29 PROCEDURE — 88305 TISSUE EXAM BY PATHOLOGIST: CPT | Performed by: INTERNAL MEDICINE

## 2021-10-29 PROCEDURE — 45385 COLONOSCOPY W/LESION REMOVAL: CPT | Performed by: INTERNAL MEDICINE

## 2021-10-29 PROCEDURE — 25010000002 PROPOFOL 10 MG/ML EMULSION: Performed by: NURSE ANESTHETIST, CERTIFIED REGISTERED

## 2021-10-29 RX ORDER — PROMETHAZINE HYDROCHLORIDE 25 MG/1
25 SUPPOSITORY RECTAL ONCE AS NEEDED
Status: DISCONTINUED | OUTPATIENT
Start: 2021-10-29 | End: 2021-10-29

## 2021-10-29 RX ORDER — GLYCOPYRROLATE 0.2 MG/ML
0.2 INJECTION INTRAMUSCULAR; INTRAVENOUS ONCE
Status: DISCONTINUED | OUTPATIENT
Start: 2021-10-29 | End: 2021-10-29

## 2021-10-29 RX ORDER — ONDANSETRON 2 MG/ML
4 INJECTION INTRAMUSCULAR; INTRAVENOUS ONCE AS NEEDED
Status: DISCONTINUED | OUTPATIENT
Start: 2021-10-29 | End: 2021-10-29 | Stop reason: HOSPADM

## 2021-10-29 RX ORDER — PROMETHAZINE HYDROCHLORIDE 25 MG/1
25 TABLET ORAL ONCE AS NEEDED
Status: DISCONTINUED | OUTPATIENT
Start: 2021-10-29 | End: 2021-10-29

## 2021-10-29 RX ORDER — SODIUM CHLORIDE, SODIUM LACTATE, POTASSIUM CHLORIDE, CALCIUM CHLORIDE 600; 310; 30; 20 MG/100ML; MG/100ML; MG/100ML; MG/100ML
30 INJECTION, SOLUTION INTRAVENOUS CONTINUOUS
Status: DISCONTINUED | OUTPATIENT
Start: 2021-10-29 | End: 2021-10-29 | Stop reason: HOSPADM

## 2021-10-29 RX ORDER — PROMETHAZINE HYDROCHLORIDE 25 MG/1
25 SUPPOSITORY RECTAL ONCE AS NEEDED
Status: DISCONTINUED | OUTPATIENT
Start: 2021-10-29 | End: 2021-10-29 | Stop reason: HOSPADM

## 2021-10-29 RX ORDER — MEPERIDINE HYDROCHLORIDE 25 MG/ML
12.5 INJECTION INTRAMUSCULAR; INTRAVENOUS; SUBCUTANEOUS
Status: DISCONTINUED | OUTPATIENT
Start: 2021-10-29 | End: 2021-10-29

## 2021-10-29 RX ORDER — OXYCODONE HYDROCHLORIDE 5 MG/1
5 TABLET ORAL
Status: DISCONTINUED | OUTPATIENT
Start: 2021-10-29 | End: 2021-10-29

## 2021-10-29 RX ORDER — GLYCOPYRROLATE 0.2 MG/ML
INJECTION INTRAMUSCULAR; INTRAVENOUS AS NEEDED
Status: DISCONTINUED | OUTPATIENT
Start: 2021-10-29 | End: 2021-10-29 | Stop reason: SURG

## 2021-10-29 RX ORDER — DEXMEDETOMIDINE HYDROCHLORIDE 100 UG/ML
INJECTION, SOLUTION INTRAVENOUS AS NEEDED
Status: DISCONTINUED | OUTPATIENT
Start: 2021-10-29 | End: 2021-10-29 | Stop reason: SURG

## 2021-10-29 RX ORDER — PROMETHAZINE HYDROCHLORIDE 25 MG/1
25 TABLET ORAL ONCE AS NEEDED
Status: DISCONTINUED | OUTPATIENT
Start: 2021-10-29 | End: 2021-10-29 | Stop reason: HOSPADM

## 2021-10-29 RX ORDER — ONDANSETRON 2 MG/ML
4 INJECTION INTRAMUSCULAR; INTRAVENOUS ONCE AS NEEDED
Status: DISCONTINUED | OUTPATIENT
Start: 2021-10-29 | End: 2021-10-29

## 2021-10-29 RX ORDER — OXYCODONE HYDROCHLORIDE 5 MG/1
5 TABLET ORAL
Status: DISCONTINUED | OUTPATIENT
Start: 2021-10-29 | End: 2021-10-29 | Stop reason: HOSPADM

## 2021-10-29 RX ORDER — PROPOFOL 10 MG/ML
VIAL (ML) INTRAVENOUS AS NEEDED
Status: DISCONTINUED | OUTPATIENT
Start: 2021-10-29 | End: 2021-10-29 | Stop reason: SURG

## 2021-10-29 RX ORDER — MEPERIDINE HYDROCHLORIDE 25 MG/ML
12.5 INJECTION INTRAMUSCULAR; INTRAVENOUS; SUBCUTANEOUS
Status: DISCONTINUED | OUTPATIENT
Start: 2021-10-29 | End: 2021-10-29 | Stop reason: HOSPADM

## 2021-10-29 RX ORDER — LIDOCAINE HYDROCHLORIDE 20 MG/ML
INJECTION, SOLUTION INFILTRATION; PERINEURAL AS NEEDED
Status: DISCONTINUED | OUTPATIENT
Start: 2021-10-29 | End: 2021-10-29 | Stop reason: SURG

## 2021-10-29 RX ADMIN — LIDOCAINE HYDROCHLORIDE 20 MG: 20 INJECTION, SOLUTION INFILTRATION; PERINEURAL at 08:17

## 2021-10-29 RX ADMIN — PROPOFOL 100 MG: 10 INJECTION, EMULSION INTRAVENOUS at 08:35

## 2021-10-29 RX ADMIN — DEXMEDETOMIDINE HYDROCHLORIDE 10 MCG: 100 INJECTION, SOLUTION, CONCENTRATE INTRAVENOUS at 08:17

## 2021-10-29 RX ADMIN — GLYCOPYRROLATE 0.2 MG: 0.2 INJECTION INTRAMUSCULAR; INTRAVENOUS at 08:16

## 2021-10-29 RX ADMIN — PROPOFOL 50 MG: 10 INJECTION, EMULSION INTRAVENOUS at 08:52

## 2021-10-29 RX ADMIN — PROPOFOL 80 MG: 10 INJECTION, EMULSION INTRAVENOUS at 08:17

## 2021-10-29 RX ADMIN — SODIUM CHLORIDE, POTASSIUM CHLORIDE, SODIUM LACTATE AND CALCIUM CHLORIDE 30 ML/HR: 600; 310; 30; 20 INJECTION, SOLUTION INTRAVENOUS at 07:53

## 2021-10-29 RX ADMIN — PROPOFOL 100 MCG/KG/MIN: 10 INJECTION, EMULSION INTRAVENOUS at 08:17

## 2021-10-29 NOTE — ANESTHESIA PREPROCEDURE EVALUATION
Anesthesia Evaluation     Patient summary reviewed and Nursing notes reviewed   no history of anesthetic complications:  NPO Solid Status: > 8 hours  NPO Liquid Status: > 2 hours           Airway   Mallampati: III  TM distance: >3 FB  Neck ROM: full  No difficulty expected  Dental      Pulmonary - normal exam    breath sounds clear to auscultation  (+) sleep apnea,   Cardiovascular - normal exam  Exercise tolerance: good (4-7 METS)    Rhythm: regular    (+) hypertension, hyperlipidemia,       Neuro/Psych  (+) headaches,     GI/Hepatic/Renal/Endo    (+)  GERD,  diabetes mellitus,     Musculoskeletal (-) negative ROS    Abdominal    Substance History - negative use     OB/GYN negative ob/gyn ROS         Other - negative ROS                       Anesthesia Plan    ASA 3     general   total IV anesthesia    Anesthetic plan, all risks, benefits, and alternatives have been provided, discussed and informed consent has been obtained with: patient.

## 2021-10-29 NOTE — ANESTHESIA POSTPROCEDURE EVALUATION
Patient: Cristino Johns    Procedure Summary     Date: 10/29/21 Room / Location: [facility] ENDOSCOPY 4 / [facility] ENDOSCOPY    Anesthesia Start: 0814 Anesthesia Stop: 0901    Procedures:       ESOPHAGOGASTRODUODENOSCOPY with biopsy (N/A )      COLONOSCOPY with cold snare polypectomy (N/A ) Diagnosis:       Generalized abdominal pain      Gastroesophageal reflux disease, unspecified whether esophagitis present      Iron deficiency anemia, unspecified iron deficiency anemia type      Oropharyngeal dysphagia      (Generalized abdominal pain [R10.84])      (Gastroesophageal reflux disease, unspecified whether esophagitis present [K21.9])      (Iron deficiency anemia, unspecified iron deficiency anemia type [D50.9])      (Oropharyngeal dysphagia [R13.12])    Surgeons: Vincenzo iKng MD Provider: Jose Fuentes MD    Anesthesia Type: general ASA Status: 3          Anesthesia Type: general    Vitals  Vitals Value Taken Time   /81 10/29/21 0908   Temp 36.6 °C (97.8 °F) 10/29/21 0907   Pulse 71 10/29/21 0908   Resp 11 10/29/21 0908   SpO2 97 % 10/29/21 0908           Post Anesthesia Care and Evaluation    Patient location during evaluation: bedside  Patient participation: complete - patient participated  Level of consciousness: awake  Pain management: adequate  Airway patency: patent  Anesthetic complications: No anesthetic complications  PONV Status: none  Cardiovascular status: acceptable and stable  Respiratory status: acceptable  Hydration status: acceptable    Comments: An Anesthesiologist personally participated in the most demanding procedures (including induction and emergence if applicable) in the anesthesia plan, monitored the course of anesthesia administration at frequent intervals and remained physically present and available for immediate diagnosis and treatment of emergencies.

## 2021-11-01 LAB
CYTO UR: NORMAL
LAB AP CASE REPORT: NORMAL
LAB AP CLINICAL INFORMATION: NORMAL
PATH REPORT.FINAL DX SPEC: NORMAL
PATH REPORT.GROSS SPEC: NORMAL

## 2021-11-22 ENCOUNTER — TELEPHONE (OUTPATIENT)
Dept: FAMILY MEDICINE CLINIC | Age: 61
End: 2021-11-22

## 2021-11-22 RX ORDER — DULAGLUTIDE 0.75 MG/.5ML
0.75 INJECTION, SOLUTION SUBCUTANEOUS WEEKLY
Qty: 2 ML | Refills: 0 | Status: SHIPPED | OUTPATIENT
Start: 2021-11-22 | End: 2021-12-16 | Stop reason: SDUPTHER

## 2021-11-22 NOTE — TELEPHONE ENCOUNTER
PT DOES NOT WANT TO HAVE [facility] AS PRIMARY.   PT WANTS EITHER [facility] OR [facility] TO BE HIS PCP.   HE IS AWARE THAT THEY ARE NOT TAKING ON NEW PTS RIGHT NOW. ADVISED THAT I WOULD HAVE TO GET IT APPROVED BEFORE AN APPT CAN BE MADE.

## 2021-11-22 NOTE — TELEPHONE ENCOUNTER
I would be willing to see the patient.  However, it is not likely I would be able to see him before he needs his medicines refilled.  He should keep the scheduled appointment he has with Dr. Reed in order to get his medications refilled.  Otherwise, okay to schedule him with me.

## 2021-11-23 ENCOUNTER — LAB (OUTPATIENT)
Dept: LAB | Facility: HOSPITAL | Age: 61
End: 2021-11-23

## 2021-11-23 ENCOUNTER — OFFICE VISIT (OUTPATIENT)
Dept: CARDIOLOGY | Facility: CLINIC | Age: 61
End: 2021-11-23

## 2021-11-23 VITALS
DIASTOLIC BLOOD PRESSURE: 78 MMHG | HEIGHT: 74 IN | BODY MASS INDEX: 40.43 KG/M2 | WEIGHT: 315 LBS | SYSTOLIC BLOOD PRESSURE: 142 MMHG | HEART RATE: 73 BPM

## 2021-11-23 DIAGNOSIS — E78.2 MIXED HYPERLIPIDEMIA: ICD-10-CM

## 2021-11-23 DIAGNOSIS — R60.0 PEDAL EDEMA: Primary | ICD-10-CM

## 2021-11-23 DIAGNOSIS — I10 ESSENTIAL HYPERTENSION: ICD-10-CM

## 2021-11-23 DIAGNOSIS — R60.0 PEDAL EDEMA: ICD-10-CM

## 2021-11-23 PROCEDURE — 83880 ASSAY OF NATRIURETIC PEPTIDE: CPT

## 2021-11-23 PROCEDURE — 99214 OFFICE O/P EST MOD 30 MIN: CPT | Performed by: INTERNAL MEDICINE

## 2021-11-23 PROCEDURE — 80048 BASIC METABOLIC PNL TOTAL CA: CPT

## 2021-11-23 PROCEDURE — 36415 COLL VENOUS BLD VENIPUNCTURE: CPT

## 2021-11-23 RX ORDER — FUROSEMIDE 20 MG/1
20 TABLET ORAL DAILY
Qty: 90 TABLET | Refills: 1 | Status: SHIPPED | OUTPATIENT
Start: 2021-11-23 | End: 2022-05-16 | Stop reason: SDUPTHER

## 2021-11-23 NOTE — ASSESSMENT & PLAN NOTE
Recent echocardiogram showed normal LV systolic function.  He has no other evidence of volume overload.  Exact etiology of pedal edema is unclear.  Side effect of amlodipine is certainly a possibility.  Another differential diagnosis would be dependent edema.  He reports improvement with Lasix.  Recommend to start taking Lasix 20 mg on a daily basis.  Will check BMP and BNP today to have a baseline.  Will discontinue hydrochlorothiazide to avoid electrolyte imbalances.

## 2021-11-23 NOTE — ASSESSMENT & PLAN NOTE
Blood pressure well controlled.  Continue amlodipine and losartan at the current dose.  Changing hydrochlorothiazide to Lasix since patient has significant edema as well.

## 2021-11-23 NOTE — PROGRESS NOTES
CARDIOLOGY FOLLOW-UP PROGRESS NOTE        Chief Complaint  Follow-up, Hyperlipidemia, and Hypertension, pedal edema    Subjective            Cristino Johns presents to [facility] CARDIOLOGY  History of Present Illness    This is a 60-year-old male with diabetes mellitus, hypertension hyperlipidemia, who was previously seen and evaluated for chest pain, shortness of breath and pedal edema.  Since last visit in July, patient underwent echocardiogram and stress testing.  SPECT stress test was negative for reversible ischemia.  Echocardiogram showed normal LV function.  He was empirically started on oral Lasix due to persisting edema.  He is taking Lasix only intermittently.  Per patient swelling is better but not completely gone.  He has not had any chest pain episodes recently.  Shortness of breath is stable.  Denies any dizziness, palpitations or syncopal episodes.       Past History:    Medical History:  Past Medical History:   Diagnosis Date   • Diabetes (HCC)    • GERD (gastroesophageal reflux disease)    • History of COVID-19     fall of 2020   • Hyperlipidemia    • Hypertension    • Polyp, vocal cord    • Spinal headache    • Urinary frequency        Surgical History: has a past surgical history that includes Vascular surgery; Colonoscopy; Esophagus surgery; Hernia repair; Esophagogastroduodenoscopy (N/A, 10/29/2021); and Colonoscopy (N/A, 10/29/2021).     Family History: family history includes Cancer in his mother; Diabetes in his mother; Hypertension in his mother.     Social History: reports that he quit smoking about 6 years ago. His smoking use included cigarettes. He has a 45.00 pack-year smoking history. He has never used smokeless tobacco. He reports current alcohol use. He reports previous drug use.    Allergies: Latex    Current Outpatient Medications on File Prior to Visit   Medication Sig   • amLODIPine (NORVASC) 5 MG tablet Take 1 tablet by mouth Daily. (Patient taking  "differently: Take 10 mg by mouth Daily. 2 tabs)   • aspirin 81 MG EC tablet Take 81 mg by mouth Daily.   • atorvastatin (LIPITOR) 40 MG tablet Take 1 tablet by mouth Daily.   • Dulaglutide (Trulicity) 0.75 MG/0.5ML solution pen-injector Inject 0.75 mg under the skin into the appropriate area as directed 1 (One) Time Per Week.   • hydroCHLOROthiazide (HYDRODIURIL) 25 MG tablet Take 1 tablet by mouth Daily.   • losartan (COZAAR) 100 MG tablet Take 1 tablet by mouth Daily.   • metFORMIN (GLUCOPHAGE) 1000 MG tablet Take 1 tablet by mouth 2 (Two) Times a Day With Meals.   • omeprazole (priLOSEC) 40 MG capsule Take 1 capsule by mouth daily as needed for Gerd.   •  furosemide (Lasix) 20 MG tablet Take 1 tablet by mouth Daily (not taking every day)   • tamsulosin (FLOMAX) 0.4 MG capsule 24 hr capsule Take 1 capsule by mouth Daily. (Patient taking differently: Take 1 capsule by mouth. Prn for freq.)            Review of Systems   Respiratory: Negative for cough, shortness of breath and wheezing.    Cardiovascular: Positive for leg swelling. Negative for chest pain and palpitations.   Gastrointestinal: Negative for nausea and vomiting.   Neurological: Negative for dizziness and syncope.        Objective     /78   Pulse 73   Ht 188 cm (74\")   Wt (!) 144 kg (317 lb)   BMI 40.70 kg/m²       Physical Exam    General : Alert, awake, no acute distress  Neck : Supple, no carotid bruit, no jugular venous distention  CVS : Regular rate and rhythm, no murmur, rubs or gallops  Lungs: Clear to auscultation bilaterally, no crackles or rhonchi  Abdomen: Soft, nontender, bowel sounds heard in all 4 quadrants  Extremities: Warm, well-perfused, no pedal edema    Result Review :     The following data was reviewed by: Ajay Magallon MD on 11/23/2021:    CMP    CMP 4/2/21   Glucose 140 (A)   BUN 12   Creatinine 1.00   Sodium 137   Potassium 4.0   Chloride 95 (A)   Calcium 9.3   Albumin 4.2   Total Bilirubin 0.63   Alkaline " Phosphatase 98   AST (SGOT) 20   ALT (SGPT) 20   (A) Abnormal value            CBC    CBC 4/2/21 4/19/21 8/6/21   WBC 11.07 (A) 10.37 8.75   RBC 4.87 4.35 (A) 4.78   Hemoglobin 11.70 (A) 10.40 (A) 11.4 (A)   Hematocrit 36.9 (A) 32.6 (A) 35.3 (A)   MCV 75.8 (A) 74.9 (A) 73.8 (A)   MCH 24.0 (A) 23.9 (A) 23.8 (A)   MCHC 31.7 (A) 31.9 (A) 32.3   RDW 14.5 14.1 14.9   Platelets 322.00 296.00 298   (A) Abnormal value            TSH    TSH 4/2/21   TSH 1.310           Lipid Panel    Lipid Panel 4/2/21   Total Cholesterol 109   Triglycerides 126   HDL Cholesterol 48   VLDL Cholesterol 25   LDL Cholesterol  36 (A)   (A) Abnormal value       Comments are available for some flowsheets but are not being displayed.                Data reviewed: Cardiology studies        Results for orders placed in visit on 07/21/21    Adult Transthoracic Echo Complete w/ Color, Spectral and Contrast if necessary per protocol    Interpretation Summary  · Left ventricular ejection fraction appears to be 61 - 65%.  · Left ventricular diastolic function was normal.  · No evidence of significant valvular disease      Results for orders placed in visit on 07/06/21    Stress Test With Myocardial Perfusion One Day    Interpretation Summary  · Myocardial perfusion imaging indicates a normal myocardial perfusion study with no evidence of ischemia.  · Small apical and inferoapical perfusion defect noted, which is likely an artifact.  · Left ventricular ejection fraction is normal. (Calculated EF = 54%).  · Findings consistent with a normal ECG stress test.  · Impressions are consistent with a low risk study.  · Diaphragmatic attenuation artifact is present.               Assessment and Plan        Diagnoses and all orders for this visit:    1. Pedal edema (Primary)  Assessment & Plan:  Recent echocardiogram showed normal LV systolic function.  He has no other evidence of volume overload.  Exact etiology of pedal edema is unclear.  Side effect of amlodipine  is certainly a possibility.  Another differential diagnosis would be dependent edema.  He reports improvement with Lasix.  Recommend to start taking Lasix 20 mg on a daily basis.  Will check BMP and BNP today to have a baseline.  Will discontinue hydrochlorothiazide to avoid electrolyte imbalances.    Orders:  -     Basic Metabolic Panel; Future  -     proBNP; Future  -     furosemide (Lasix) 20 MG tablet; Take 1 tablet by mouth Daily.  Dispense: 90 tablet; Refill: 1    2. Essential hypertension  Assessment & Plan:  Blood pressure well controlled.  Continue amlodipine and losartan at the current dose.  Changing hydrochlorothiazide to Lasix since patient has significant edema as well.      3. Mixed hyperlipidemia  Assessment & Plan:  LDL 36, at goal.  Continue atorvastatin at the current dose.              Follow Up     Return in about 4 months (around 3/23/2022) for Next scheduled follow up, Device check.    Patient was given instructions and counseling regarding his condition or for health maintenance advice. Please see specific information pulled into the AVS if appropriate.

## 2021-11-24 LAB
ANION GAP SERPL CALCULATED.3IONS-SCNC: 13 MMOL/L (ref 5–15)
BUN SERPL-MCNC: 13 MG/DL (ref 8–23)
BUN/CREAT SERPL: 13.3 (ref 7–25)
CALCIUM SPEC-SCNC: 9.5 MG/DL (ref 8.6–10.5)
CHLORIDE SERPL-SCNC: 100 MMOL/L (ref 98–107)
CO2 SERPL-SCNC: 28 MMOL/L (ref 22–29)
CREAT SERPL-MCNC: 0.98 MG/DL (ref 0.76–1.27)
GFR SERPL CREATININE-BSD FRML MDRD: 94 ML/MIN/1.73
GLUCOSE SERPL-MCNC: 127 MG/DL (ref 65–99)
NT-PROBNP SERPL-MCNC: 24.3 PG/ML (ref 0–900)
POTASSIUM SERPL-SCNC: 4 MMOL/L (ref 3.5–5.2)
SODIUM SERPL-SCNC: 141 MMOL/L (ref 136–145)

## 2021-12-10 ENCOUNTER — TELEPHONE (OUTPATIENT)
Dept: CARDIOLOGY | Facility: CLINIC | Age: 61
End: 2021-12-10

## 2021-12-10 NOTE — TELEPHONE ENCOUNTER
----- Message from Margaret Romo RN sent at 12/10/2021  7:45 AM EST -----    ----- Message -----  From: Ajay Magallon MD  Sent: 12/9/2021   5:26 PM EST  To: Margaret Romo RN    Labs done on 1123 showed normal kidney functions, sodium and potassium.  ProBNP (lab for congestive heart failure) is normal as well.    Continue same medication without changes and follow-up as scheduled earlier.      Electronically signed by Ajay Magallon MD, 12/09/21, 5:26 PM EST.

## 2021-12-16 ENCOUNTER — OFFICE VISIT (OUTPATIENT)
Dept: FAMILY MEDICINE CLINIC | Age: 61
End: 2021-12-16

## 2021-12-16 ENCOUNTER — TELEPHONE (OUTPATIENT)
Dept: FAMILY MEDICINE CLINIC | Age: 61
End: 2021-12-16

## 2021-12-16 VITALS
WEIGHT: 315 LBS | TEMPERATURE: 98.4 F | HEIGHT: 74 IN | SYSTOLIC BLOOD PRESSURE: 141 MMHG | BODY MASS INDEX: 40.43 KG/M2 | HEART RATE: 73 BPM | DIASTOLIC BLOOD PRESSURE: 67 MMHG

## 2021-12-16 DIAGNOSIS — I10 ESSENTIAL HYPERTENSION: ICD-10-CM

## 2021-12-16 DIAGNOSIS — E78.2 MIXED HYPERLIPIDEMIA: ICD-10-CM

## 2021-12-16 DIAGNOSIS — E11.42 TYPE 2 DIABETES MELLITUS WITH DIABETIC POLYNEUROPATHY, WITHOUT LONG-TERM CURRENT USE OF INSULIN (HCC): Primary | ICD-10-CM

## 2021-12-16 DIAGNOSIS — F17.210 NICOTINE DEPENDENCE, CIGARETTES, UNCOMPLICATED: ICD-10-CM

## 2021-12-16 DIAGNOSIS — G47.33 OBSTRUCTIVE SLEEP APNEA SYNDROME: ICD-10-CM

## 2021-12-16 LAB
EXPIRATION DATE: NORMAL
HBA1C MFR BLD: 7.9 %
Lab: NORMAL

## 2021-12-16 PROCEDURE — 83036 HEMOGLOBIN GLYCOSYLATED A1C: CPT | Performed by: FAMILY MEDICINE

## 2021-12-16 PROCEDURE — 99214 OFFICE O/P EST MOD 30 MIN: CPT | Performed by: FAMILY MEDICINE

## 2021-12-16 PROCEDURE — 0003A COVID-19 (PFIZER): CPT | Performed by: FAMILY MEDICINE

## 2021-12-16 PROCEDURE — 91300 COVID-19 (PFIZER): CPT | Performed by: FAMILY MEDICINE

## 2021-12-16 RX ORDER — OMEPRAZOLE 40 MG/1
40 CAPSULE, DELAYED RELEASE ORAL DAILY
Qty: 90 CAPSULE | Refills: 1 | Status: SHIPPED | OUTPATIENT
Start: 2021-12-16 | End: 2022-02-18 | Stop reason: SDUPTHER

## 2021-12-16 RX ORDER — ATORVASTATIN CALCIUM 40 MG/1
40 TABLET, FILM COATED ORAL DAILY
Qty: 90 TABLET | Refills: 1 | Status: SHIPPED | OUTPATIENT
Start: 2021-12-16 | End: 2022-05-16 | Stop reason: SDUPTHER

## 2021-12-16 RX ORDER — LOSARTAN POTASSIUM 100 MG/1
100 TABLET ORAL DAILY
Qty: 90 TABLET | Refills: 1 | Status: SHIPPED | OUTPATIENT
Start: 2021-12-16 | End: 2022-05-16 | Stop reason: SDUPTHER

## 2021-12-16 RX ORDER — AMLODIPINE BESYLATE 5 MG/1
5 TABLET ORAL 2 TIMES DAILY
Qty: 180 TABLET | Refills: 1 | Status: SHIPPED | OUTPATIENT
Start: 2021-12-16 | End: 2021-12-17 | Stop reason: SDUPTHER

## 2021-12-16 NOTE — PROGRESS NOTES
Cristino Johns presents to [facility].    Chief Complaint:  Diabetes, blood pressure, cholesterol    Subjective       History of Present Illness:  Cristino is in today for follow-up on his care.  He has a history of type 2 diabetes for which he currently takes Metformin and Trulicity.  He does not check his blood sugar very frequently.  His most recent A1c as noted below.    He also has a history of hypertension and elevated cholesterol for which he currently is on treatment as noted below.  He did see cardiology earlier this year due to an issue with edema.  He has since stopped hydrochlorothiazide, but he is taking furosemide presently.    Review of Systems:  Review of Systems   Constitutional: Negative for chills and fever.   Respiratory: Negative for cough and shortness of breath.    Cardiovascular: Negative for chest pain and palpitations.   Gastrointestinal: Negative for abdominal pain, nausea and vomiting.      Objective   Medical History:  Past Medical History:   • Diabetes (HCC)   • GERD (gastroesophageal reflux disease)   • History of COVID-19    fall of 2020   • Hyperlipidemia   • Hypertension   • Polyp, vocal cord   • Spinal headache   • Urinary frequency     Past Surgical History:   • COLONOSCOPY   • COLONOSCOPY    Procedure: COLONOSCOPY with cold snare polypectomy;  Surgeon: Vincenzo King MD;  Location: [facility] ENDOSCOPY;  Service: Gastroenterology;  Laterality: N/A;  colon polyp and hemorroids   • ENDOSCOPY    Procedure: ESOPHAGOGASTRODUODENOSCOPY with biopsy;  Surgeon: Vincenzo King MD;  Location: [facility] ENDOSCOPY;  Service: Gastroenterology;  Laterality: N/A;  small hiatal hernia   • ESOPHAGUS SURGERY    aorta wrapped around esophagus   • HERNIA REPAIR   • VASCULAR SURGERY      Family History   Problem Relation Age of Onset   • Hypertension Mother    • Cancer Mother    • Diabetes Mother    • Malig Hyperthermia Neg Hx      Social History     Tobacco Use    • Smoking status: Former Smoker     Packs/day: 1.50     Years: 30.00     Pack years: 45.00     Types: Cigarettes     Quit date: 2015     Years since quittin.4   • Smokeless tobacco: Never Used   Substance Use Topics   • Alcohol use: Yes     Comment: little, sometimes on the weekends.       Health Maintenance Due   Topic Date Due   • ANNUAL PHYSICAL  Never done   • ZOSTER VACCINE (1 of 2) Never done   • LUNG CANCER SCREENING  Never done   • HEPATITIS C SCREENING  Never done   • DIABETIC EYE EXAM  Never done        Immunization History   Administered Date(s) Administered   • COVID-19 (PFIZER) 2021, 2021, 2021   • Pneumococcal Polysaccharide (PPSV23) 2017   • Tdap 2020       Allergies   Allergen Reactions   • Latex Irritability        Medications:  Current Outpatient Medications on File Prior to Visit   Medication Sig   • aspirin 81 MG EC tablet Take 81 mg by mouth Daily.   • furosemide (Lasix) 20 MG tablet Take 1 tablet by mouth Daily.   • tamsulosin (FLOMAX) 0.4 MG capsule 24 hr capsule Take 1 capsule by mouth Daily. (Patient taking differently: Take 1 capsule by mouth. Prn for freq.)   • [DISCONTINUED] amLODIPine (NORVASC) 5 MG tablet Take 1 tablet by mouth Daily. (Patient taking differently: Take 10 mg by mouth Daily. 2 tabs)   • [DISCONTINUED] atorvastatin (LIPITOR) 40 MG tablet Take 1 tablet by mouth Daily.   • [DISCONTINUED] Dulaglutide (Trulicity) 0.75 MG/0.5ML solution pen-injector Inject 0.75 mg under the skin into the appropriate area as directed 1 (One) Time Per Week. (Patient taking differently: Inject 0.5 mL under the skin into the appropriate area as directed 1 (One) Time Per Week.)   • [DISCONTINUED] hydroCHLOROthiazide (HYDRODIURIL) 25 MG tablet Take 1 tablet by mouth Daily.   • [DISCONTINUED] losartan (COZAAR) 100 MG tablet Take 1 tablet by mouth Daily.   • [DISCONTINUED] metFORMIN (GLUCOPHAGE) 1000 MG tablet Take 1 tablet by mouth 2 (Two) Times a Day With  "Meals.   • [DISCONTINUED] omeprazole (priLOSEC) 40 MG capsule Take 1 capsule by mouth daily as needed for Gerd.   • [DISCONTINUED] azithromycin (ZITHROMAX) 250 MG tablet Take 2 tablets by mouth today, then 1 tablet on days 2-5     No current facility-administered medications on file prior to visit.       Vital Signs:   /66 (BP Location: Right arm, Patient Position: Sitting)   Pulse 75   Temp 98.4 °F (36.9 °C) (Oral)   Ht 188 cm (74.02\")   Wt (!) 145 kg (320 lb 3.2 oz)   BMI 41.09 kg/m²       Physical Exam:  Physical Exam  Vitals reviewed.   Constitutional:       General: He is not in acute distress.     Appearance: He is obese. He is not ill-appearing.   Eyes:      Pupils: Pupils are equal, round, and reactive to light.   Neck:      Comments: No thyromegaly  Cardiovascular:      Rate and Rhythm: Normal rate and regular rhythm.      Pulses:           Dorsalis pedis pulses are 2+ on the right side and 2+ on the left side.   Pulmonary:      Effort: Pulmonary effort is normal.      Breath sounds: Normal breath sounds.   Abdominal:      General: There is no distension.      Palpations: Abdomen is soft.      Tenderness: There is no abdominal tenderness.   Musculoskeletal:      Cervical back: Neck supple.   Feet:      Right foot:      Protective Sensation: 3 sites tested. 3 sites sensed.      Skin integrity: Callus present.      Left foot:      Protective Sensation: 3 sites tested. 3 sites sensed.      Skin integrity: Callus present.      Comments: There is some degree of decreased sensation in both feet.  Some callus formation is noted particularly on the great toes medially.  Lymphadenopathy:      Cervical: No cervical adenopathy.   Skin:     Findings: No lesion or rash.   Neurological:      Mental Status: He is alert.         Result Review      The following data was reviewed by Scooby Chun MD on 12/16/2021.  Lab Results   Component Value Date    WBC 8.75 08/06/2021    HGB 11.4 (L) 08/06/2021    " HCT 35.3 (L) 08/06/2021    MCV 73.8 (L) 08/06/2021     08/06/2021     Lab Results   Component Value Date    GLUCOSE 127 (H) 11/23/2021    BUN 13 11/23/2021    CREATININE 0.98 11/23/2021     11/23/2021    K 4.0 11/23/2021     11/23/2021    CO2 28.0 11/23/2021    CALCIUM 9.5 11/23/2021    PROTEINTOT 8.2 04/02/2021    ALBUMIN 4.2 04/02/2021    ALT 20 04/02/2021    AST 20 04/02/2021    ALKPHOS 98 04/02/2021    BILITOT 0.63 04/02/2021    GLOB 4.0 (H) 04/02/2021    BCR 13.3 11/23/2021    ANIONGAP 13.0 11/23/2021      Lab Results   Component Value Date    CHLPL 109 04/02/2021    TRIG 126 04/02/2021    HDL 48 04/02/2021    LDL 36 (L) 04/02/2021     Lab Results   Component Value Date    TSH 1.310 04/02/2021     Lab Results   Component Value Date    HGBA1C 7.69 (H) 08/06/2021     Lab Results   Component Value Date    PSA 0.47 04/02/2021    PSA 0.67 01/27/2021            Assessment and Plan:   Today, we have reviewed Cristino's care.  He is mostly up-to-date on needed labs and screenings, but we will recheck an A1c.  His medications will be refilled as noted below.  He also asked for FMLA to be executed related to missing some days at work due to illness.  We will see what we can do in this regard to help him.       Diagnoses and all orders for this visit:    1. Type 2 diabetes mellitus with diabetic polyneuropathy, without long-term current use of insulin (HCC) (Primary)  -     POC Glycosylated Hemoglobin (Hb A1C)  -     Dulaglutide 1.5 MG/0.5ML solution pen-injector; Inject 1.5 mg under the skin into the appropriate area as directed 1 (One) Time Per Week.  Dispense: 13 pen; Refill: 1  -     metFORMIN (GLUCOPHAGE) 1000 MG tablet; Take 1 tablet by mouth 2 (Two) Times a Day With Meals.  Dispense: 180 tablet; Refill: 1    2. Essential hypertension  -     amLODIPine (NORVASC) 5 MG tablet; Take 1 tablet by mouth 2 (Two) Times a Day.  Dispense: 180 tablet; Refill: 1  -     losartan (COZAAR) 100 MG tablet; Take 1  tablet by mouth Daily.  Dispense: 90 tablet; Refill: 1    3. Mixed hyperlipidemia  -     atorvastatin (LIPITOR) 40 MG tablet; Take 1 tablet by mouth Daily.  Dispense: 90 tablet; Refill: 1    4. Obstructive sleep apnea syndrome  -     Ambulatory Referral to Sleep Medicine    5. Nicotine dependence, cigarettes, uncomplicated  -      CT Chest Low Dose Cancer Screening WO; Future    Other orders  -     COVID-19 Vaccine (Pfizer)  -     omeprazole (priLOSEC) 40 MG capsule; Take 1 capsule by mouth Daily.  Dispense: 90 capsule; Refill: 1          Follow Up   Return in about 6 months (around 6/16/2022).  Patient was given instructions and counseling regarding his condition or for health maintenance advice. Please see specific information pulled into the AVS if appropriate.

## 2021-12-16 NOTE — TELEPHONE ENCOUNTER
Per pharm, pts ins will not cover amlodipine 5mg bid, can you send new rx for 10mg once daily. Please advise.

## 2021-12-17 RX ORDER — AMLODIPINE BESYLATE 10 MG/1
10 TABLET ORAL
Qty: 90 TABLET | Refills: 1 | Status: SHIPPED | OUTPATIENT
Start: 2021-12-17 | End: 2022-05-16 | Stop reason: SDUPTHER

## 2021-12-17 RX ORDER — BLOOD-GLUCOSE METER
EACH MISCELLANEOUS DAILY
Qty: 1 KIT | Refills: 3 | Status: SHIPPED | OUTPATIENT
Start: 2021-12-17

## 2021-12-17 RX ORDER — BLOOD SUGAR DIAGNOSTIC
STRIP MISCELLANEOUS
Qty: 100 EACH | Refills: 3 | Status: SHIPPED | OUTPATIENT
Start: 2021-12-17 | End: 2022-11-21 | Stop reason: SDUPTHER

## 2021-12-17 RX ORDER — LANCETS
EACH MISCELLANEOUS
Qty: 100 EACH | Refills: 3 | Status: SHIPPED | OUTPATIENT
Start: 2021-12-17 | End: 2022-11-21 | Stop reason: SDUPTHER

## 2021-12-17 NOTE — TELEPHONE ENCOUNTER
Noted.  Before I make a change, confirm with Cristino that he has been taking 2 of the amlodipine tablets daily.  Thanks.

## 2021-12-17 NOTE — TELEPHONE ENCOUNTER
Noted.  I have sent the 10 mg dose and would recommend he take it at bedtime.  That may help decrease the amount of swelling he has during the day.  Thanks.

## 2021-12-27 ENCOUNTER — HOSPITAL ENCOUNTER (OUTPATIENT)
Dept: CT IMAGING | Facility: HOSPITAL | Age: 61
Discharge: HOME | End: 2021-12-27
Admitting: FAMILY MEDICINE

## 2021-12-27 DIAGNOSIS — F17.210 NICOTINE DEPENDENCE, CIGARETTES, UNCOMPLICATED: ICD-10-CM

## 2021-12-27 PROCEDURE — 71271 CT THORAX LUNG CANCER SCR C-: CPT

## 2022-01-11 ENCOUNTER — TELEPHONE (OUTPATIENT)
Dept: FAMILY MEDICINE CLINIC | Age: 62
End: 2022-01-11

## 2022-01-11 NOTE — TELEPHONE ENCOUNTER
----- Message from Karine Bee LPN sent at 12/17/2021 12:55 PM EST -----  TICKLE to call him back in 3 weeks and see how his sugars are running

## 2022-01-12 NOTE — TELEPHONE ENCOUNTER
The sugars are reasonable.  Please continue current care.  Please TICKLE for me to review chart for labs after 3/1/2022.  Thanks.

## 2022-02-18 ENCOUNTER — OFFICE VISIT (OUTPATIENT)
Dept: GASTROENTEROLOGY | Facility: CLINIC | Age: 62
End: 2022-02-18

## 2022-02-18 VITALS
DIASTOLIC BLOOD PRESSURE: 79 MMHG | WEIGHT: 315 LBS | OXYGEN SATURATION: 100 % | BODY MASS INDEX: 40.43 KG/M2 | SYSTOLIC BLOOD PRESSURE: 141 MMHG | HEART RATE: 84 BPM | HEIGHT: 74 IN

## 2022-02-18 DIAGNOSIS — R12 HEARTBURN: ICD-10-CM

## 2022-02-18 DIAGNOSIS — D50.9 IRON DEFICIENCY ANEMIA, UNSPECIFIED IRON DEFICIENCY ANEMIA TYPE: ICD-10-CM

## 2022-02-18 DIAGNOSIS — R13.12 OROPHARYNGEAL DYSPHAGIA: ICD-10-CM

## 2022-02-18 DIAGNOSIS — K21.9 GASTROESOPHAGEAL REFLUX DISEASE, UNSPECIFIED WHETHER ESOPHAGITIS PRESENT: Primary | ICD-10-CM

## 2022-02-18 PROCEDURE — 99214 OFFICE O/P EST MOD 30 MIN: CPT | Performed by: NURSE PRACTITIONER

## 2022-02-18 RX ORDER — OMEPRAZOLE 40 MG/1
40 CAPSULE, DELAYED RELEASE ORAL DAILY
Qty: 90 CAPSULE | Refills: 1 | Status: SHIPPED | OUTPATIENT
Start: 2022-02-18 | End: 2022-10-10 | Stop reason: SDUPTHER

## 2022-02-18 RX ORDER — FAMOTIDINE 20 MG/1
20 TABLET, FILM COATED ORAL
Qty: 30 TABLET | Refills: 2 | Status: SHIPPED | OUTPATIENT
Start: 2022-02-18 | End: 2022-05-16

## 2022-03-04 ENCOUNTER — TELEPHONE (OUTPATIENT)
Dept: FAMILY MEDICINE CLINIC | Age: 62
End: 2022-03-04

## 2022-03-04 DIAGNOSIS — E11.42 TYPE 2 DIABETES MELLITUS WITH DIABETIC POLYNEUROPATHY, WITHOUT LONG-TERM CURRENT USE OF INSULIN: Primary | ICD-10-CM

## 2022-03-04 DIAGNOSIS — I10 ESSENTIAL HYPERTENSION: ICD-10-CM

## 2022-03-04 DIAGNOSIS — D50.9 IRON DEFICIENCY ANEMIA, UNSPECIFIED IRON DEFICIENCY ANEMIA TYPE: ICD-10-CM

## 2022-03-04 DIAGNOSIS — Z12.5 SCREENING FOR PROSTATE CANCER: ICD-10-CM

## 2022-03-04 DIAGNOSIS — E78.2 MIXED HYPERLIPIDEMIA: ICD-10-CM

## 2022-03-04 DIAGNOSIS — Z11.59 NEED FOR HEPATITIS C SCREENING TEST: ICD-10-CM

## 2022-03-04 NOTE — TELEPHONE ENCOUNTER
Please let Cristino know that I have reviewed his chart.  I do want to repeat some blood work related to his diabetes and other issues, but I would like to delay until 4/4/2022.  He will be due for repeat prostate blood test at that time.  Please TICKLE for labs to be done on or after 4/4/2022 as ordered.  Let me know if he has other concerns.  Thanks.

## 2022-04-04 ENCOUNTER — TELEPHONE (OUTPATIENT)
Dept: FAMILY MEDICINE CLINIC | Age: 62
End: 2022-04-04

## 2022-04-04 NOTE — TELEPHONE ENCOUNTER
----- Message from Karine Bee LPN sent at 3/4/2022  9:32 AM EST -----  TICKLE for labs to be done on or after 4/4/2022 as ordered

## 2022-04-18 ENCOUNTER — LAB (OUTPATIENT)
Dept: LAB | Facility: HOSPITAL | Age: 62
End: 2022-04-18

## 2022-04-18 DIAGNOSIS — E78.2 MIXED HYPERLIPIDEMIA: ICD-10-CM

## 2022-04-18 DIAGNOSIS — I10 ESSENTIAL HYPERTENSION: ICD-10-CM

## 2022-04-18 DIAGNOSIS — E11.42 TYPE 2 DIABETES MELLITUS WITH DIABETIC POLYNEUROPATHY, WITHOUT LONG-TERM CURRENT USE OF INSULIN: ICD-10-CM

## 2022-04-18 DIAGNOSIS — D50.9 IRON DEFICIENCY ANEMIA, UNSPECIFIED IRON DEFICIENCY ANEMIA TYPE: ICD-10-CM

## 2022-04-18 DIAGNOSIS — Z11.59 NEED FOR HEPATITIS C SCREENING TEST: ICD-10-CM

## 2022-04-18 DIAGNOSIS — Z12.5 SCREENING FOR PROSTATE CANCER: ICD-10-CM

## 2022-04-18 LAB
ALBUMIN SERPL-MCNC: 4.3 G/DL (ref 3.5–5.2)
ALBUMIN UR-MCNC: 6 MG/DL
ALBUMIN/GLOB SERPL: 1.3 G/DL
ALP SERPL-CCNC: 104 U/L (ref 39–117)
ALT SERPL W P-5'-P-CCNC: 20 U/L (ref 1–41)
ANION GAP SERPL CALCULATED.3IONS-SCNC: 11 MMOL/L (ref 5–15)
AST SERPL-CCNC: 18 U/L (ref 1–40)
BILIRUB SERPL-MCNC: 0.3 MG/DL (ref 0–1.2)
BUN SERPL-MCNC: 13 MG/DL (ref 8–23)
BUN/CREAT SERPL: 12.7 (ref 7–25)
CALCIUM SPEC-SCNC: 8.9 MG/DL (ref 8.6–10.5)
CHLORIDE SERPL-SCNC: 102 MMOL/L (ref 98–107)
CHOLEST SERPL-MCNC: 109 MG/DL (ref 0–200)
CO2 SERPL-SCNC: 25 MMOL/L (ref 22–29)
CREAT SERPL-MCNC: 1.02 MG/DL (ref 0.76–1.27)
DEPRECATED RDW RBC AUTO: 41.8 FL (ref 37–54)
EGFRCR SERPLBLD CKD-EPI 2021: 83.1 ML/MIN/1.73
ERYTHROCYTE [DISTWIDTH] IN BLOOD BY AUTOMATED COUNT: 15.2 % (ref 12.3–15.4)
GLOBULIN UR ELPH-MCNC: 3.3 GM/DL
GLUCOSE SERPL-MCNC: 123 MG/DL (ref 65–99)
HBA1C MFR BLD: 8 % (ref 4.8–5.6)
HCT VFR BLD AUTO: 35.1 % (ref 37.5–51)
HDLC SERPL-MCNC: 46 MG/DL (ref 40–60)
HGB BLD-MCNC: 11.1 G/DL (ref 13–17.7)
IRON 24H UR-MRATE: 44 MCG/DL (ref 59–158)
IRON SATN MFR SERPL: 11 % (ref 20–50)
LDLC SERPL CALC-MCNC: 37 MG/DL (ref 0–100)
LDLC/HDLC SERPL: 0.7 {RATIO}
MCH RBC QN AUTO: 23.8 PG (ref 26.6–33)
MCHC RBC AUTO-ENTMCNC: 31.6 G/DL (ref 31.5–35.7)
MCV RBC AUTO: 75.2 FL (ref 79–97)
PLATELET # BLD AUTO: 322 10*3/MM3 (ref 140–450)
PMV BLD AUTO: 10.3 FL (ref 6–12)
POTASSIUM SERPL-SCNC: 3.7 MMOL/L (ref 3.5–5.2)
PROT SERPL-MCNC: 7.6 G/DL (ref 6–8.5)
PSA SERPL-MCNC: 0.48 NG/ML (ref 0–4)
RBC # BLD AUTO: 4.67 10*6/MM3 (ref 4.14–5.8)
SODIUM SERPL-SCNC: 138 MMOL/L (ref 136–145)
TIBC SERPL-MCNC: 410 MCG/DL (ref 298–536)
TRANSFERRIN SERPL-MCNC: 275 MG/DL (ref 200–360)
TRIGL SERPL-MCNC: 155 MG/DL (ref 0–150)
TSH SERPL DL<=0.05 MIU/L-ACNC: 2.86 UIU/ML (ref 0.27–4.2)
VLDLC SERPL-MCNC: 26 MG/DL (ref 5–40)
WBC NRBC COR # BLD: 10.32 10*3/MM3 (ref 3.4–10.8)

## 2022-04-18 PROCEDURE — 86803 HEPATITIS C AB TEST: CPT

## 2022-04-18 PROCEDURE — 80061 LIPID PANEL: CPT

## 2022-04-18 PROCEDURE — 85027 COMPLETE CBC AUTOMATED: CPT

## 2022-04-18 PROCEDURE — 84443 ASSAY THYROID STIM HORMONE: CPT

## 2022-04-18 PROCEDURE — 80053 COMPREHEN METABOLIC PANEL: CPT

## 2022-04-18 PROCEDURE — 83540 ASSAY OF IRON: CPT

## 2022-04-18 PROCEDURE — 84466 ASSAY OF TRANSFERRIN: CPT

## 2022-04-18 PROCEDURE — 83036 HEMOGLOBIN GLYCOSYLATED A1C: CPT

## 2022-04-18 PROCEDURE — G0103 PSA SCREENING: HCPCS

## 2022-04-18 PROCEDURE — 82043 UR ALBUMIN QUANTITATIVE: CPT

## 2022-04-18 PROCEDURE — 36415 COLL VENOUS BLD VENIPUNCTURE: CPT

## 2022-04-19 LAB — HCV AB SER DONR QL: NORMAL

## 2022-05-16 ENCOUNTER — OFFICE VISIT (OUTPATIENT)
Dept: FAMILY MEDICINE CLINIC | Age: 62
End: 2022-05-16

## 2022-05-16 VITALS
BODY MASS INDEX: 40.43 KG/M2 | TEMPERATURE: 98.3 F | HEART RATE: 85 BPM | DIASTOLIC BLOOD PRESSURE: 82 MMHG | WEIGHT: 315 LBS | SYSTOLIC BLOOD PRESSURE: 153 MMHG | HEIGHT: 74 IN

## 2022-05-16 DIAGNOSIS — E11.42 TYPE 2 DIABETES MELLITUS WITH DIABETIC POLYNEUROPATHY, WITHOUT LONG-TERM CURRENT USE OF INSULIN: Primary | ICD-10-CM

## 2022-05-16 DIAGNOSIS — E78.2 MIXED HYPERLIPIDEMIA: ICD-10-CM

## 2022-05-16 DIAGNOSIS — R49.0 HOARSENESS: ICD-10-CM

## 2022-05-16 DIAGNOSIS — E66.01 MORBID OBESITY: ICD-10-CM

## 2022-05-16 DIAGNOSIS — R60.0 PEDAL EDEMA: ICD-10-CM

## 2022-05-16 DIAGNOSIS — I10 ESSENTIAL HYPERTENSION: ICD-10-CM

## 2022-05-16 DIAGNOSIS — D64.9 ANEMIA, UNSPECIFIED TYPE: ICD-10-CM

## 2022-05-16 PROCEDURE — 99214 OFFICE O/P EST MOD 30 MIN: CPT | Performed by: FAMILY MEDICINE

## 2022-05-16 RX ORDER — LOSARTAN POTASSIUM 100 MG/1
100 TABLET ORAL DAILY
Qty: 90 TABLET | Refills: 1 | Status: SHIPPED | OUTPATIENT
Start: 2022-05-16 | End: 2022-11-21 | Stop reason: SDUPTHER

## 2022-05-16 RX ORDER — AMLODIPINE BESYLATE 10 MG/1
10 TABLET ORAL
Qty: 90 TABLET | Refills: 1 | Status: SHIPPED | OUTPATIENT
Start: 2022-05-16 | End: 2022-11-21 | Stop reason: SDUPTHER

## 2022-05-16 RX ORDER — FUROSEMIDE 20 MG/1
20 TABLET ORAL DAILY
Qty: 90 TABLET | Refills: 1 | Status: SHIPPED | OUTPATIENT
Start: 2022-05-16 | End: 2022-11-21 | Stop reason: SDUPTHER

## 2022-05-16 RX ORDER — DULAGLUTIDE 3 MG/.5ML
3 INJECTION, SOLUTION SUBCUTANEOUS WEEKLY
Qty: 6 ML | Refills: 3 | Status: SHIPPED | OUTPATIENT
Start: 2022-05-16 | End: 2022-11-21 | Stop reason: SDUPTHER

## 2022-05-16 RX ORDER — ATORVASTATIN CALCIUM 40 MG/1
40 TABLET, FILM COATED ORAL DAILY
Qty: 90 TABLET | Refills: 1 | Status: SHIPPED | OUTPATIENT
Start: 2022-05-16 | End: 2022-11-21 | Stop reason: SDUPTHER

## 2022-05-16 NOTE — PROGRESS NOTES
Cristino Johns presents to [facility].    Chief Complaint:  Diabetes, anemia    Subjective       History of Present Illness:  Cristino is in today for follow-up on type 2 diabetes.  His most recent A1c came back at 8.0%.  He has been monitoring his sugar and noted it to be in the 120s or 130s most of the time.  He is hoping to avoid going back on insulin.  He does okay with his diet.  He is on Trulicity at 1.5 mg weekly and tolerates that dose fairly well.    Cristino was also noted to have some degree of anemia on his most recent blood test.  This is been persistent.  He is up-to-date on EGD and colonoscopy.  It is fairly low level.    He has noted some hoarseness.  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      Review of Systems:  Review of Systems   Constitutional: Negative for chills and fever.   Respiratory: Negative for cough and shortness of breath.    Cardiovascular: Negative for chest pain and palpitations.   Gastrointestinal: Negative for abdominal pain, nausea and vomiting.        Objective   Medical History:  Past Medical History:   • Diabetes (HCC)   • GERD (gastroesophageal reflux disease)   • History of COVID-19    fall of 2020   • Hyperlipidemia   • Hypertension   • Polyp, vocal cord   • Spinal headache   • Urinary frequency     Past Surgical History:   • COLONOSCOPY   • COLONOSCOPY    Procedure: COLONOSCOPY with cold snare polypectomy;  Surgeon: Vincenzo King MD;  Location: [facility] ENDOSCOPY;  Service: Gastroenterology;  Laterality: N/A;  colon polyp and hemorroids   • ENDOSCOPY    Procedure: ESOPHAGOGASTRODUODENOSCOPY with biopsy;  Surgeon: Vincenzo King MD;  Location: [facility] ENDOSCOPY;  Service: Gastroenterology;  Laterality: N/A;  small hiatal hernia   • ESOPHAGUS SURGERY    aorta wrapped around esophagus   • HERNIA REPAIR   • UPPER GASTROINTESTINAL ENDOSCOPY   • VASCULAR  SURGERY      Family History   Problem Relation Age of Onset   • Hypertension Mother    • Cancer Mother    • Diabetes Mother    • Malig Hyperthermia Neg Hx    • Colon cancer Neg Hx      Social History     Tobacco Use   • Smoking status: Former Smoker     Packs/day: 1.50     Years: 30.00     Pack years: 45.00     Types: Cigarettes     Quit date: 2015     Years since quittin.8   • Smokeless tobacco: Never Used   Substance Use Topics   • Alcohol use: Yes     Comment: little, sometimes on the weekends.       Health Maintenance Due   Topic Date Due   • ANNUAL PHYSICAL  Never done   • ZOSTER VACCINE (1 of 2) Never done   • DIABETIC EYE EXAM  Never done        Immunization History   Administered Date(s) Administered   • COVID-19 (PFIZER) PURPLE CAP 2021, 2021, 2021   • Pneumococcal Polysaccharide (PPSV23) 2017   • Tdap 2020       Allergies   Allergen Reactions   • Latex Irritability        Medications:  Current Outpatient Medications on File Prior to Visit   Medication Sig   • Accu-Chek Softclix Lancets lancets Check blood sugar once daily (Patient taking differently: Check blood sugar once daily)   • aspirin 81 MG EC tablet Take 81 mg by mouth Daily.   • Blood Glucose Monitoring Suppl (Accu-Chek Candace Plus) w/Device kit Check blood sugar once daily   • fluorometholone (FML) 0.1 % ophthalmic suspension Instill 1 drop into both eyes three times a day for 3 days, then 2 times a day for 3 days, and then once a day for 3 days.  Shake well before each use.   • glucose blood (Accu-Chek Candace Plus) test strip Check blood sugar once daily. (Patient taking differently: Check blood sugar once daily.)   • omeprazole (priLOSEC) 40 MG capsule Take 1 capsule by mouth Daily.   • tamsulosin (FLOMAX) 0.4 MG capsule 24 hr capsule Take 1 capsule by mouth Daily. (Patient taking differently: Take 1 capsule by mouth. Prn for freq.)   • [DISCONTINUED] amLODIPine (NORVASC) 10 MG tablet Take 1 tablet by mouth  "every night at bedtime.   • [DISCONTINUED] atorvastatin (LIPITOR) 40 MG tablet Take 1 tablet by mouth Daily.   • [DISCONTINUED] Dulaglutide 1.5 MG/0.5ML solution pen-injector Inject 1.5 mg under the skin into the appropriate area as directed 1 (One) Time Per Week.   • [DISCONTINUED] famotidine (Pepcid) 20 MG tablet Take 1 tablet by mouth every night at bedtime.   • [DISCONTINUED] furosemide (Lasix) 20 MG tablet Take 1 tablet by mouth Daily.   • [DISCONTINUED] losartan (COZAAR) 100 MG tablet Take 1 tablet by mouth Daily.   • [DISCONTINUED] metFORMIN (GLUCOPHAGE) 1000 MG tablet Take 1 tablet by mouth 2 (Two) Times a Day With Meals.     No current facility-administered medications on file prior to visit.       Vital Signs:   /82 (BP Location: Left arm, Patient Position: Sitting)   Pulse 85   Temp 98.3 °F (36.8 °C) (Oral)   Ht 188 cm (74.02\")   Wt (!) 145 kg (320 lb 9.6 oz)   BMI 41.14 kg/m²       Physical Exam:  Physical Exam  Vitals reviewed.   Constitutional:       General: He is not in acute distress.     Appearance: He is obese. He is not ill-appearing.   Eyes:      Pupils: Pupils are equal, round, and reactive to light.   Neck:      Comments: No thyromegaly  Cardiovascular:      Rate and Rhythm: Normal rate and regular rhythm.   Pulmonary:      Effort: Pulmonary effort is normal.      Breath sounds: Normal breath sounds.   Abdominal:      General: There is no distension.      Palpations: Abdomen is soft.      Tenderness: There is no abdominal tenderness.   Musculoskeletal:      Cervical back: Neck supple.   Lymphadenopathy:      Cervical: No cervical adenopathy.   Skin:     Findings: No lesion or rash.   Neurological:      Mental Status: He is alert.         Result Review      The following data was reviewed by cSooby Chun MD on 05/16/2022.  Lab Results   Component Value Date    WBC 10.32 04/18/2022    HGB 11.1 (L) 04/18/2022    HCT 35.1 (L) 04/18/2022    MCV 75.2 (L) 04/18/2022     " 04/18/2022     Lab Results   Component Value Date    GLUCOSE 123 (H) 04/18/2022    BUN 13 04/18/2022    CREATININE 1.02 04/18/2022     04/18/2022    K 3.7 04/18/2022     04/18/2022    CO2 25.0 04/18/2022    CALCIUM 8.9 04/18/2022    PROTEINTOT 7.6 04/18/2022    ALBUMIN 4.30 04/18/2022    ALT 20 04/18/2022    AST 18 04/18/2022    ALKPHOS 104 04/18/2022    BILITOT 0.3 04/18/2022    GLOB 3.3 04/18/2022    AGRATIO 1.3 04/18/2022    BCR 12.7 04/18/2022    ANIONGAP 11.0 04/18/2022      Lab Results   Component Value Date    CHOL 109 04/18/2022    CHLPL 109 04/02/2021    TRIG 155 (H) 04/18/2022    HDL 46 04/18/2022    LDL 37 04/18/2022     Lab Results   Component Value Date    TSH 2.860 04/18/2022     Lab Results   Component Value Date    HGBA1C 8.00 (H) 04/18/2022     Lab Results   Component Value Date    PSA 0.482 04/18/2022    PSA 0.47 04/02/2021    PSA 0.67 01/27/2021            Assessment and Plan:   Today, we have reviewed his care.  Cristino seems to be doing well overall.  I have really encouraged him to try to be more compliant with diet and may be some weight loss over the next few months.  We will press the dose of Trulicity as noted below to see if that will help with this.  We will contact him again in mid July for repeat A1c.  Otherwise, we will plan to see him back in about 6 months.  His blood pressure will be repeated today before he leaves.  This hoarseness is ongoing, and he previously had vocal cord polyp.  Referral to ENT will be made.       Diagnoses and all orders for this visit:    1. Type 2 diabetes mellitus with diabetic polyneuropathy, without long-term current use of insulin (HCC) (Primary)  -     Dulaglutide (Trulicity) 3 MG/0.5ML solution pen-injector; Inject 0.5 mL under the skin into the appropriate area as directed 1 (One) Time Per Week.  Dispense: 6 mL; Refill: 3  -     metFORMIN (GLUCOPHAGE) 1000 MG tablet; Take 1 tablet by mouth 2 (Two) Times a Day With Meals.  Dispense: 180  tablet; Refill: 1    2. Essential hypertension  -     amLODIPine (NORVASC) 10 MG tablet; Take 1 tablet by mouth every night at bedtime.  Dispense: 90 tablet; Refill: 1  -     losartan (COZAAR) 100 MG tablet; Take 1 tablet by mouth Daily.  Dispense: 90 tablet; Refill: 1    3. Mixed hyperlipidemia  -     atorvastatin (LIPITOR) 40 MG tablet; Take 1 tablet by mouth Daily.  Dispense: 90 tablet; Refill: 1    4. Pedal edema  -     furosemide (Lasix) 20 MG tablet; Take 1 tablet by mouth Daily.  Dispense: 90 tablet; Refill: 1    5. Anemia, unspecified type  Comments:  As above.    6. Hoarseness  -     Ambulatory Referral to ENT (Otolaryngology)    7. Morbid obesity (HCC)  Comments:  Continue to work toward gradual weight loss.          Follow Up   Return in about 6 months (around 11/16/2022) for Recheck.  Patient was given instructions and counseling regarding his condition or for health maintenance advice. Please see specific information pulled into the AVS if appropriate.

## 2022-06-15 ENCOUNTER — OFFICE VISIT (OUTPATIENT)
Dept: OTOLARYNGOLOGY | Facility: CLINIC | Age: 62
End: 2022-06-15

## 2022-06-15 VITALS — WEIGHT: 315 LBS | HEIGHT: 73 IN | TEMPERATURE: 98 F | BODY MASS INDEX: 41.75 KG/M2

## 2022-06-15 DIAGNOSIS — K21.9 GASTROESOPHAGEAL REFLUX DISEASE WITHOUT ESOPHAGITIS: ICD-10-CM

## 2022-06-15 DIAGNOSIS — R49.0 VOICE HOARSENESS: Primary | ICD-10-CM

## 2022-06-15 PROCEDURE — 99203 OFFICE O/P NEW LOW 30 MIN: CPT | Performed by: OTOLARYNGOLOGY

## 2022-06-15 PROCEDURE — 31575 DIAGNOSTIC LARYNGOSCOPY: CPT | Performed by: OTOLARYNGOLOGY

## 2022-06-15 NOTE — PROGRESS NOTES
Patient Name: Cristino Johns   Visit Date: 06/15/2022   Patient ID: [id number]  Provider: Romulo العلي MD    Sex: male  Location: [facility]   YOB: 1960  Location Address: [address] [place] [address], [address] [place] [address],?[id number]    Primary Care Provider Scooby Chun MD  Location Phone: [phone]    Referring Provider: Scooby Chun, *        Chief Complaint  Hoarse (NEW PATIENT, HOARSENESS, STRANGLES EASY)    Subjective    History of Present Illness  Cristino Johns is a 61 y.o. male who presents to [facility] today as a consult from Scooby Chun, *.    He presents to the clinic today for evaluation of voice hoarseness and history of vocal fold polyps.  He was longtime smoker, but quit sometime ago, no longer smokes.  He denies any throat emeli pain or difficulty with swallowing.  Denies any weight loss, and overall feels well.  He notes that for the last 6 months or so he has had voice hoarseness which sometimes gets worse as she talks.  He does have some issues with GERD and works third shift.  He sometimes does eat before going to sleep.  He is on omeprazole for this.  Informs me that he had surgery many years ago for vocal fold polyps and had vocal fold stripping.    Past Medical History:   Diagnosis Date   • Diabetes (HCC)    • GERD (gastroesophageal reflux disease)    • History of COVID-19     fall of 2020   • Hyperlipidemia    • Hypertension    • Polyp, vocal cord    • Spinal headache    • Urinary frequency        Past Surgical History:   Procedure Laterality Date   • COLONOSCOPY     • COLONOSCOPY N/A 10/29/2021    Procedure: COLONOSCOPY with cold snare polypectomy;  Surgeon: Vincenzo King MD;  Location: [facility] END[facility];  Service: Gastroenterology;  Laterality: N/A;  colon polyp and hemorroids   • ENDOSCOPY N/A 10/29/2021    Procedure: ESOPHAGOGASTRODUODENOSCOPY with biopsy;  Surgeon: Vincenzo King  MD Ho;  Location: [facility] END[facility];  Service: Gastroenterology;  Laterality: N/A;  small hiatal hernia   • ESOPHAGUS SURGERY      aorta wrapped around esophagus   • HERNIA REPAIR     • UPPER GASTROINTESTINAL ENDOSCOPY     • VASCULAR SURGERY           Current Outpatient Medications:   •  Accu-Chek Softclix Lancets lancets, Check blood sugar once daily (Patient taking differently: Check blood sugar once daily), Disp: 100 each, Rfl: 3  •  amLODIPine (NORVASC) 10 MG tablet, Take 1 tablet by mouth every night at bedtime., Disp: 90 tablet, Rfl: 1  •  aspirin 81 MG EC tablet, Take 81 mg by mouth Daily., Disp: , Rfl:   •  atorvastatin (LIPITOR) 40 MG tablet, Take 1 tablet by mouth Daily., Disp: 90 tablet, Rfl: 1  •  Blood Glucose Monitoring Suppl (Accu-Chek Candace Plus) w/Device kit, Check blood sugar once daily, Disp: 1 kit, Rfl: 3  •  Dulaglutide (Trulicity) 3 MG/0.5ML solution pen-injector, Inject 0.5 mL under the skin into the appropriate area as directed 1 (One) Time Per Week., Disp: 6 mL, Rfl: 3  •  fluorometholone (FML) 0.1 % ophthalmic suspension, Instill 1 drop into both eyes three times a day for 3 days, then 2 times a day for 3 days, and then once a day for 3 days.  Shake well before each use., Disp: 5 mL, Rfl: 0  •  furosemide (Lasix) 20 MG tablet, Take 1 tablet by mouth Daily., Disp: 90 tablet, Rfl: 1  •  glucose blood (Accu-Chek Candace Plus) test strip, Check blood sugar once daily. (Patient taking differently: Check blood sugar once daily.), Disp: 100 each, Rfl: 3  •  losartan (COZAAR) 100 MG tablet, Take 1 tablet by mouth Daily., Disp: 90 tablet, Rfl: 1  •  metFORMIN (GLUCOPHAGE) 1000 MG tablet, Take 1 tablet by mouth 2 (Two) Times a Day With Meals., Disp: 180 tablet, Rfl: 1  •  omeprazole (priLOSEC) 40 MG capsule, Take 1 capsule by mouth Daily., Disp: 90 capsule, Rfl: 1  •  tamsulosin (FLOMAX) 0.4 MG capsule 24 hr capsule, Take 1 capsule by mouth Daily. (Patient taking differently: Take 1 capsule by  "mouth. Prn for freq.), Disp: 90 capsule, Rfl: 3     Allergies   Allergen Reactions   • Latex Irritability       Family History   Problem Relation Age of Onset   • Hypertension Mother    • Cancer Mother    • Diabetes Mother    • Malig Hyperthermia Neg Hx    • Colon cancer Neg Hx         Social History     Social History Narrative   • Not on file       Objective     Vital Signs:   Temp 98 °F (36.7 °C)   Ht 185.4 cm (73\")   Wt (!) 144 kg (318 lb 6.4 oz)   BMI 42.01 kg/m²       Physical Exam    Flexible laryngoscopy    Date/Time: 6/15/2022 1:11 PM  Performed by: Romulo العلي MD  Authorized by: Romulo العلي MD     Procedure details:     Indications: evaluation of larynx and hoarseness, dysphagia, or aspiration      Medication:  Afrin    Instrument: flexible fiberoptic laryngoscope      Scope location: right nare    Comments:      Nasal cavities free of any lesions, polyps, or purulence.  The vocal folds are bilaterally mobile with good excursion.  Scar along the anterior third to the mid half of the right vocal fold and anterior third of the left vocal fold.  No evidence of ulcerative lesions or masses.        Constitutional   Appearance  · : well developed, well-nourished, alert and in no acute distress, voice clear and strong    Head  Inspection  · : no deformities or lesions  Face  Inspection  · : No facial lesions; House-Brackmann I/VI bilaterally  Palpation  · : No TMJ crepitus nor  muscle tenderness bilaterally    Eyes  Vision  Visual Fields  · : Extraocular movements are intact. No spontaneous or gaze-induced nystagmus.  Conjunctivae  · : clear  Sclerae  · : clear  Pupils and Irises  · : pupils equal, round, and reactive to light.     Ears, Nose, Mouth and Throat    Ears    External Ears  · : appearance within normal limits, no lesions present  Otoscopic Examination  · : Tympanic membrane appearance within normal limits bilaterally without perforations, well-aerated middle " ears  Hearing  · : intact to conversational voice both ears  Tunning fork testing:     :    Nose    External Nose  · : appearance normal  Intranasal Exam  · : mucosa within normal limits, vestibules normal, no intranasal lesions present, septum deviated towards the left without obstruction, sinuses non tender to percussion  Oral Cavity    Oral Mucosa  · : oral mucosa normal without pallor or cyanosis  Lips  · : lip appearance normal  Teeth  · : normal dentition for age  Gums  · : gums pink, non-swollen, no bleeding present  Tongue  · : tongue appearance normal; normal mobility  Palate  · : hard palate normal, soft palate appearance normal with symmetric mobility    Throat    Oropharynx  · : no inflammation or lesions present, tonsils within normal limits  Hypopharynx  · : appearance within normal limits, superior epiglottis within normal limits  Larynx  · : appearance within normal limits, vocal cords within normal limits, no lesions present    Neck  Inspection/Palpation  · : normal appearance, no masses or tenderness, trachea midline; thyroid size normal, nontender, no nodules or masses present on palpation    Respiratory  Respiratory Effort  · : breathing unlabored  Inspection of Chest  · : normal appearance, no retractions    Cardiovascular  Heart  · : regular rate and rhythm    Lymphatic  Neck  · : no lymphadenopathy present  Supraclavicular Nodes  · : no lymphadenopathy present  Preauricular Nodes  · : no lymphadenopathy present    Skin and Subcutaneous Tissue  General Inspection  · : Regarding face and neck - there are no rashes present, no lesions present, and no areas of discoloration    Neurologic  Cranial Nerves  · : cranial nerves II-XII are grossly intact bilaterally  Gait and Station  · : normal gait, able to stand without diffculty    Psychiatric  Judgement and Insight  · : judgment and insight intact  Mood and Affect  · : mood normal, affect appropriate          Assessment and Plan    Diagnoses and  all orders for this visit:    1. Voice hoarseness (Primary)    2. Gastroesophageal reflux disease without esophagitis    Other orders  -     $ Laryngoscopy    Examination today revealed his voice to be intermittently hoarse but strong.  Nasolaryngoscopy was performed revealing scarring that is slightly worse on the right compared to left vocal folds but no distinct lesions or masses.  There is also some evidence of GERD.  We discussed using omeprazole, had about elevation, maintaining a healthy diet, and generalized GERD precautions.  I reassured him that I did not see any worrisome lesions, but if there is any worsening I would like to see him back in the clinic for reevaluation.  He does have symptoms and some signs of sleep apnea, and I recommended obtaining a sleep study which she is in the process of having ordered.  I will plan to see him back on an as-needed basis should there be any worsening or failure to improve despite treatment.    Follow Up   No follow-ups on file.  Patient was given instructions and counseling regarding his condition or for health maintenance advice. Please see specific information pulled into the AVS if appropriate.

## 2022-06-17 ENCOUNTER — TELEPHONE (OUTPATIENT)
Dept: FAMILY MEDICINE CLINIC | Age: 62
End: 2022-06-17

## 2022-06-17 NOTE — TELEPHONE ENCOUNTER
HUB CAN READ    FMLA FORMS RECEIVED. NOT YET STARTED. PT WILL BE INFORMED WITH FORMS ARE COMPLETE.

## 2022-07-01 ENCOUNTER — TELEPHONE (OUTPATIENT)
Dept: FAMILY MEDICINE CLINIC | Age: 62
End: 2022-07-01

## 2022-07-01 NOTE — TELEPHONE ENCOUNTER
We would not send in a steroid pack without seeing the patient.  I would recommend the use Tylenol or possibly ibuprofen over the weekend.  He could schedule a follow-up appointment next week if needed.  Thanks

## 2022-07-01 NOTE — TELEPHONE ENCOUNTER
Pt mireya' called stating pt is c/o of increased pain in R shoulder over the last few days, unable to sleep because of the pain, requesting steroid pack to help with pain.

## 2022-07-05 ENCOUNTER — OFFICE VISIT (OUTPATIENT)
Dept: FAMILY MEDICINE CLINIC | Age: 62
End: 2022-07-05

## 2022-07-05 ENCOUNTER — OFFICE VISIT (OUTPATIENT)
Dept: CARDIOLOGY | Facility: CLINIC | Age: 62
End: 2022-07-05

## 2022-07-05 ENCOUNTER — HOSPITAL ENCOUNTER (OUTPATIENT)
Dept: GENERAL RADIOLOGY | Facility: HOSPITAL | Age: 62
Discharge: HOME | End: 2022-07-05
Admitting: FAMILY MEDICINE

## 2022-07-05 VITALS
BODY MASS INDEX: 41.75 KG/M2 | SYSTOLIC BLOOD PRESSURE: 115 MMHG | HEART RATE: 78 BPM | DIASTOLIC BLOOD PRESSURE: 80 MMHG | TEMPERATURE: 98.2 F | HEIGHT: 73 IN | WEIGHT: 315 LBS

## 2022-07-05 VITALS
HEART RATE: 76 BPM | DIASTOLIC BLOOD PRESSURE: 85 MMHG | BODY MASS INDEX: 42.66 KG/M2 | HEIGHT: 72 IN | WEIGHT: 315 LBS | SYSTOLIC BLOOD PRESSURE: 120 MMHG

## 2022-07-05 DIAGNOSIS — I10 ESSENTIAL HYPERTENSION: ICD-10-CM

## 2022-07-05 DIAGNOSIS — E78.2 MIXED HYPERLIPIDEMIA: ICD-10-CM

## 2022-07-05 DIAGNOSIS — M25.511 ACUTE PAIN OF RIGHT SHOULDER: ICD-10-CM

## 2022-07-05 DIAGNOSIS — R60.0 PEDAL EDEMA: Primary | ICD-10-CM

## 2022-07-05 DIAGNOSIS — E11.42 TYPE 2 DIABETES MELLITUS WITH DIABETIC POLYNEUROPATHY, WITHOUT LONG-TERM CURRENT USE OF INSULIN: ICD-10-CM

## 2022-07-05 DIAGNOSIS — M25.511 ACUTE PAIN OF RIGHT SHOULDER: Primary | ICD-10-CM

## 2022-07-05 PROCEDURE — 73030 X-RAY EXAM OF SHOULDER: CPT

## 2022-07-05 PROCEDURE — 99213 OFFICE O/P EST LOW 20 MIN: CPT | Performed by: INTERNAL MEDICINE

## 2022-07-05 PROCEDURE — 99213 OFFICE O/P EST LOW 20 MIN: CPT | Performed by: FAMILY MEDICINE

## 2022-07-05 RX ORDER — METHYLPREDNISOLONE 4 MG/1
TABLET ORAL
Qty: 21 EACH | Refills: 0 | Status: SHIPPED | OUTPATIENT
Start: 2022-07-05 | End: 2022-11-21

## 2022-07-05 RX ORDER — HYDROCODONE BITARTRATE AND ACETAMINOPHEN 5; 325 MG/1; MG/1
1 TABLET ORAL EVERY 6 HOURS PRN
Qty: 12 TABLET | Refills: 0 | Status: SHIPPED | OUTPATIENT
Start: 2022-07-05 | End: 2022-11-21

## 2022-07-05 NOTE — PROGRESS NOTES
"  CARDIOLOGY FOLLOW-UP PROGRESS NOTE        Chief Complaint  Hypertension, Hyperlipidemia, and pedal edema (\"It comes and goes\")    Subjective            Cristino Johns presents to [facility] CARDIOLOGY  History of Present Illness    Mr Johns is here for routine 6-month follow-up visit.  He was previously seen evaluated for chest pain, shortness of breath and pedal edema.  Previous echocardiogram and stress testing were unremarkable.  Lasix was started for persisting edema.  During last office visit on 11/23/2021 he reports feeling better and the medications were continued.  Today patient reports feeling fine and denies any new complaints.  He is dealing with right shoulder pain and with restricted movements.  Denies any chest pain.  Denies shortness of breath or palpitations.  His pedal edema is stable and on some days he gets more swelling after standing for long time during work.  He is taking Lasix daily.    Past History:    Medical History:  Past Medical History:   Diagnosis Date   • Diabetes (HCC)    • GERD (gastroesophageal reflux disease)    • History of COVID-19     fall of 2020   • Hyperlipidemia    • Hypertension    • Polyp, vocal cord    • Spinal headache    • Urinary frequency        Surgical History: has a past surgical history that includes Vascular surgery; Colonoscopy; Esophagus surgery; Hernia repair; Esophagogastroduodenoscopy (N/A, 10/29/2021); Colonoscopy (N/A, 10/29/2021); and Upper gastrointestinal endoscopy.     Family History: family history includes Cancer in his mother; Diabetes in his mother; Hypertension in his mother.     Social History: reports that he quit smoking about 7 years ago. His smoking use included cigarettes. He has a 45.00 pack-year smoking history. He has never used smokeless tobacco. He reports current alcohol use. He reports previous drug use.    Allergies: Latex    Current Outpatient Medications on File Prior to Visit   Medication Sig   • Accu-Chek " "Softclix Lancets lancets Check blood sugar once daily (Patient taking differently: Check blood sugar once daily)   • amLODIPine (NORVASC) 10 MG tablet Take 1 tablet by mouth every night at bedtime.   • aspirin 81 MG EC tablet Take 81 mg by mouth Daily.   • atorvastatin (LIPITOR) 40 MG tablet Take 1 tablet by mouth Daily.   • Blood Glucose Monitoring Suppl (Accu-Chek Candace Plus) w/Device kit Check blood sugar once daily   • Dulaglutide (Trulicity) 3 MG/0.5ML solution pen-injector Inject 0.5 mL under the skin into the appropriate area as directed 1 (One) Time Per Week.   • furosemide (Lasix) 20 MG tablet Take 1 tablet by mouth Daily.   • glucose blood (Accu-Chek Candace Plus) test strip Check blood sugar once daily. (Patient taking differently: Check blood sugar once daily.)   • losartan (COZAAR) 100 MG tablet Take 1 tablet by mouth Daily.   • metFORMIN (GLUCOPHAGE) 1000 MG tablet Take 1 tablet by mouth 2 (Two) Times a Day With Meals.   • omeprazole (priLOSEC) 40 MG capsule Take 1 capsule by mouth Daily.   • tamsulosin (FLOMAX) 0.4 MG capsule 24 hr capsule Take 1 capsule by mouth Daily. (Patient taking differently: Take 1 capsule by mouth. Prn for freq.)   • [DISCONTINUED] fluorometholone (FML) 0.1 % ophthalmic suspension Instill 1 drop into both eyes three times a day for 3 days, then 2 times a day for 3 days, and then once a day for 3 days.  Shake well before each use.     No current facility-administered medications on file prior to visit.          Review of Systems   Respiratory: Negative for cough, shortness of breath and wheezing.    Cardiovascular: Negative for chest pain, palpitations and leg swelling.   Gastrointestinal: Negative for nausea and vomiting.   Neurological: Negative for dizziness and syncope.        Objective     /85 (BP Location: Right arm)   Pulse 76   Ht 182.9 cm (72\")   Wt (!) 145 kg (320 lb)   BMI 43.40 kg/m²       Physical Exam    General : Alert, awake, no acute distress  Neck : " Supple, no carotid bruit, no jugular venous distention  CVS : Regular rate and rhythm, no murmur, rubs or gallops  Lungs: Clear to auscultation bilaterally, no crackles or rhonchi  Abdomen: Soft, nontender, bowel sounds heard in all 4 quadrants  Extremities: Warm, well-perfused, trace edema bilaterally.    Result Review :     The following data was reviewed by: Ajay Magallon MD on 07/05/2022:    CMP    CMP 11/23/21 4/18/22   Glucose 127 (A) 123 (A)   BUN 13 13   Creatinine 0.98 1.02   eGFR African Am 94    Sodium 141 138   Potassium 4.0 3.7   Chloride 100 102   Calcium 9.5 8.9   Albumin  4.30   Total Bilirubin  0.3   Alkaline Phosphatase  104   AST (SGOT)  18   ALT (SGPT)  20   (A) Abnormal value            CBC    CBC 8/6/21 4/18/22   WBC 8.75 10.32   RBC 4.78 4.67   Hemoglobin 11.4 (A) 11.1 (A)   Hematocrit 35.3 (A) 35.1 (A)   MCV 73.8 (A) 75.2 (A)   MCH 23.8 (A) 23.8 (A)   MCHC 32.3 31.6   RDW 14.9 15.2   Platelets 298 322   (A) Abnormal value            TSH    TSH 4/18/22   TSH 2.860           Lipid Panel    Lipid Panel 4/18/22   Total Cholesterol 109   Triglycerides 155 (A)   HDL Cholesterol 46   VLDL Cholesterol 26   LDL Cholesterol  37   LDL/HDL Ratio 0.70   (A) Abnormal value                 Data reviewed: Cardiology studies        Results for orders placed in visit on 07/21/21    Adult Transthoracic Echo Complete w/ Color, Spectral and Contrast if necessary per protocol    Interpretation Summary  · Left ventricular ejection fraction appears to be 61 - 65%.  · Left ventricular diastolic function was normal.  · No evidence of significant valvular disease      Results for orders placed in visit on 07/06/21    Stress Test With Myocardial Perfusion One Day    Interpretation Summary  · Myocardial perfusion imaging indicates a normal myocardial perfusion study with no evidence of ischemia.  · Small apical and inferoapical perfusion defect noted, which is likely an artifact.  · Left ventricular ejection fraction  is normal. (Calculated EF = 54%).  · Findings consistent with a normal ECG stress test.  · Impressions are consistent with a low risk study.  · Diaphragmatic attenuation artifact is present.      CT scan of the chest done on 12/27/2021 showed    FINDINGS:          The visualized soft tissue structures at the base of the neck including the thyroid appear within normal limits.  There is no lower cervical or axillary adenopathy.     The heart size is normal.  There is no pericardial effusion.  The aorta is normal in caliber without evidence of aneurysm formation.  There is evidence of a right retroesophageal subclavian artery with surgical clips.  There is coronary artery atherosclerotic calcification.  There is no mediastinal or hilar lymphadenopathy by size criteria.  The esophagus is normal in course and   caliber.     The central airways are patent.  There is mild central peribronchial wall thickening.  There is biapical paraseptal emphysema.  There is bandlike linear scarring or atelectasis within the right lung apex.  There is no focal consolidation, pleural effusion, or pneumothorax.  There are no suspicious pulmonary nodules.  There are calcified granulomas.  There is a calcified pleural-based nodule within the anterior left upper lobe on image 50.      Visualized portions of the upper abdomen demonstrate no acute findings.  There are mild degenerative changes of the thoracic spine.  No suspicious lytic or sclerotic osseous lesions.             Assessment and Plan        Diagnoses and all orders for this visit:    1. Pedal edema (Primary)  Assessment & Plan:  This is mostly dependent edema due to chronic venous insufficiency.  Lasix is giving some benefits which will be continued.  Encouraged to use compression stockings on daily basis at work.  Counseled regarding foot elevation.  Continue Lasix at the current dose.  Recent labs showed stable electrolytes and renal functions.      2. Mixed  hyperlipidemia  Assessment & Plan:  LDL in 30s, well controlled.  Continue atorvastatin.      3. Essential hypertension  Assessment & Plan:  Blood pressure is on the higher side on left arm.  Blood pressure is normal on the right arm.  This is falsely low due to retroesophageal right subclavian artery and previous surgery.  He also reports significant shoulder pain which is also contributing higher blood pressure.  At this time we will continue amlodipine and losartan which are already at maximal doses.  Recommend to take blood pressure from left arm during follow-up visits.  If persistently elevated the beta-blocker may be added to the regimen.  He currently denies any chest pain or shortness of breath.              Follow Up     Return in about 1 year (around 7/5/2023) for Next scheduled follow up.    Patient was given instructions and counseling regarding his condition or for health maintenance advice. Please see specific information pulled into the AVS if appropriate.

## 2022-07-05 NOTE — ASSESSMENT & PLAN NOTE
Blood pressure is on the higher side on left arm.  Blood pressure is normal on the right arm.  This is falsely low due to retroesophageal right subclavian artery and previous surgery.  He also reports significant shoulder pain which is also contributing higher blood pressure.  At this time we will continue amlodipine and losartan which are already at maximal doses.  Recommend to take blood pressure from left arm during follow-up visits.  If persistently elevated the beta-blocker may be added to the regimen.  He currently denies any chest pain or shortness of breath.

## 2022-07-05 NOTE — PROGRESS NOTES
Cristino Johns presents to [facility].    Chief Complaint:  'been having a lot of shoulder pain', diabetes    Subjective       History of Present Illness:  Cristino is in today for evaluation of right shoulder pain.  This is been bothering him for approximately 2 months.  He is not aware of any obvious injury that might of caused it.  He is very active at his workplace.  The shoulder is worse with activity.  He has difficulty sleeping when it is flared up like this.  He has not had any previous surgery on the shoulder.  He has seen his chiropractor and had 12 visits.      Cristino also has type 2 diabetes for which he remains on Trulicity.  He is also taking metformin.  He says that he has not been checking his blood sugar the last few days.  We have recommended he check it once daily.      Review of Systems:  Review of Systems   Constitutional: Negative for chills and fever.   Respiratory: Negative for cough and shortness of breath.    Cardiovascular: Negative for chest pain and palpitations.   Gastrointestinal: Negative for abdominal pain, nausea and vomiting.        Objective   Medical History:  Past Medical History:   • Diabetes (HCC)   • GERD (gastroesophageal reflux disease)   • History of COVID-19    fall of 2020   • Hyperlipidemia   • Hypertension   • Polyp, vocal cord   • Spinal headache   • Urinary frequency     Past Surgical History:   • COLONOSCOPY   • COLONOSCOPY    Procedure: COLONOSCOPY with cold snare polypectomy;  Surgeon: Vincenzo King MD;  Location: [facility] ENDOSCOPY;  Service: Gastroenterology;  Laterality: N/A;  colon polyp and hemorroids   • ENDOSCOPY    Procedure: ESOPHAGOGASTRODUODENOSCOPY with biopsy;  Surgeon: Vincenzo King MD;  Location: [facility] ENDOSCOPY;  Service: Gastroenterology;  Laterality: N/A;  small hiatal hernia   • ESOPHAGUS SURGERY    aorta wrapped around esophagus   • HERNIA REPAIR   • UPPER GASTROINTESTINAL ENDOSCOPY   • VASCULAR  SURGERY      Family History   Problem Relation Age of Onset   • Hypertension Mother    • Cancer Mother    • Diabetes Mother    • Malig Hyperthermia Neg Hx    • Colon cancer Neg Hx      Social History     Tobacco Use   • Smoking status: Former Smoker     Packs/day: 1.50     Years: 30.00     Pack years: 45.00     Types: Cigarettes     Quit date: 2015     Years since quittin.0   • Smokeless tobacco: Never Used   Substance Use Topics   • Alcohol use: Yes     Comment: little, sometimes on the weekends.       Health Maintenance Due   Topic Date Due   • ANNUAL PHYSICAL  Never done   • ZOSTER VACCINE (1 of 2) Never done   • DIABETIC EYE EXAM  Never done   • COVID-19 Vaccine (4 - Booster for Pfizer series) 2022        Immunization History   Administered Date(s) Administered   • COVID-19 (PFIZER) PURPLE CAP 2021, 2021, 2021   • Pneumococcal Polysaccharide (PPSV23) 2017   • Tdap 2020       Allergies   Allergen Reactions   • Latex Irritability        Medications:  Current Outpatient Medications on File Prior to Visit   Medication Sig   • Accu-Chek Softclix Lancets lancets Check blood sugar once daily (Patient taking differently: Check blood sugar once daily)   • amLODIPine (NORVASC) 10 MG tablet Take 1 tablet by mouth every night at bedtime.   • aspirin 81 MG EC tablet Take 81 mg by mouth Daily.   • atorvastatin (LIPITOR) 40 MG tablet Take 1 tablet by mouth Daily.   • Blood Glucose Monitoring Suppl (Accu-Chek Candace Plus) w/Device kit Check blood sugar once daily   • Dulaglutide (Trulicity) 3 MG/0.5ML solution pen-injector Inject 0.5 mL under the skin into the appropriate area as directed 1 (One) Time Per Week.   • furosemide (Lasix) 20 MG tablet Take 1 tablet by mouth Daily.   • glucose blood (Accu-Chek Candace Plus) test strip Check blood sugar once daily. (Patient taking differently: Check blood sugar once daily.)   • losartan (COZAAR) 100 MG tablet Take 1 tablet by mouth Daily.   •  "metFORMIN (GLUCOPHAGE) 1000 MG tablet Take 1 tablet by mouth 2 (Two) Times a Day With Meals.   • omeprazole (priLOSEC) 40 MG capsule Take 1 capsule by mouth Daily.   • tamsulosin (FLOMAX) 0.4 MG capsule 24 hr capsule Take 1 capsule by mouth Daily. (Patient taking differently: Take 1 capsule by mouth. Prn for freq.)   • [DISCONTINUED] fluorometholone (FML) 0.1 % ophthalmic suspension Instill 1 drop into both eyes three times a day for 3 days, then 2 times a day for 3 days, and then once a day for 3 days.  Shake well before each use.     No current facility-administered medications on file prior to visit.       Vital Signs:   /80 (BP Location: Right arm, Patient Position: Sitting)   Pulse 78   Temp 98.2 °F (36.8 °C) (Oral)   Ht 185.4 cm (72.99\")   Wt (!) 145 kg (320 lb 9.6 oz)   BMI 42.31 kg/m²       Physical Exam:  Physical Exam  Vitals reviewed.   Constitutional:       General: He is not in acute distress.     Appearance: He is obese. He is not ill-appearing.   Eyes:      Pupils: Pupils are equal, round, and reactive to light.   Neck:      Comments: No thyromegaly  Cardiovascular:      Rate and Rhythm: Normal rate and regular rhythm.   Pulmonary:      Effort: Pulmonary effort is normal.      Breath sounds: Normal breath sounds.   Abdominal:      General: There is no distension.      Palpations: Abdomen is soft.      Tenderness: There is no abdominal tenderness.   Musculoskeletal:      Cervical back: Neck supple.      Comments: He has some mildly limited range of motion with posterior movement of the shoulder.  He is able to put it through other range of motion but with discomfort.   Lymphadenopathy:      Cervical: No cervical adenopathy.   Skin:     Findings: No lesion or rash.   Neurological:      Mental Status: He is alert.         Result Review      The following data was reviewed by Scooby Chun MD on 07/05/2022.  Lab Results   Component Value Date    WBC 10.32 04/18/2022    HGB 11.1 (L) " 04/18/2022    HCT 35.1 (L) 04/18/2022    MCV 75.2 (L) 04/18/2022     04/18/2022     Lab Results   Component Value Date    GLUCOSE 123 (H) 04/18/2022    BUN 13 04/18/2022    CREATININE 1.02 04/18/2022     04/18/2022    K 3.7 04/18/2022     04/18/2022    CO2 25.0 04/18/2022    CALCIUM 8.9 04/18/2022    PROTEINTOT 7.6 04/18/2022    ALBUMIN 4.30 04/18/2022    ALT 20 04/18/2022    AST 18 04/18/2022    ALKPHOS 104 04/18/2022    BILITOT 0.3 04/18/2022    GLOB 3.3 04/18/2022    AGRATIO 1.3 04/18/2022    BCR 12.7 04/18/2022    ANIONGAP 11.0 04/18/2022      Lab Results   Component Value Date    CHOL 109 04/18/2022    CHLPL 109 04/02/2021    TRIG 155 (H) 04/18/2022    HDL 46 04/18/2022    LDL 37 04/18/2022     Lab Results   Component Value Date    TSH 2.860 04/18/2022     Lab Results   Component Value Date    HGBA1C 8.00 (H) 04/18/2022     Lab Results   Component Value Date    PSA 0.482 04/18/2022    PSA 0.47 04/02/2021    PSA 0.67 01/27/2021            Assessment and Plan:   Today, we have reviewed his care.  The shoulder is really giving him trouble.  We will move ahead with some evaluation as noted below.  I am going to prescribe a Medrol dose pack to see if that will give him some relief.  Risks of elevated blood pressure and blood sugar are discussed.  We will also prescribe a limited amount of hydrocodone for as needed use given the difficulty he has at night when this thing is flared.  I would have a low threshold to refer to orthopedics for evaluation given that he is already had 12 visits with chiropractor without significant benefit.  We will see what the x-ray shows and assess his response.  We will otherwise reach out to him in about 2 weeks for an A1c.       Diagnoses and all orders for this visit:    1. Acute pain of right shoulder (Primary)  -     XR Shoulder 2+ View Right; Future  -     methylPREDNISolone (MEDROL) 4 MG dose pack; Take as directed on package instructions.  Dispense: 1 each;  Refill: 0  -     HYDROcodone-acetaminophen (NORCO) 5-325 MG per tablet; Take 1 tablet by mouth Every 6 (Six) Hours As Needed for Moderate Pain .  Dispense: 12 tablet; Refill: 0    2. Type 2 diabetes mellitus with diabetic polyneuropathy, without long-term current use of insulin (HCC)  Comments:  As above.          Follow Up   Return if symptoms worsen or fail to improve.  Patient was given instructions and counseling regarding his condition or for health maintenance advice. Please see specific information pulled into the AVS if appropriate.

## 2022-07-05 NOTE — ASSESSMENT & PLAN NOTE
This is mostly dependent edema due to chronic venous insufficiency.  Lasix is giving some benefits which will be continued.  Encouraged to use compression stockings on daily basis at work.  Counseled regarding foot elevation.  Continue Lasix at the current dose.  Recent labs showed stable electrolytes and renal functions.

## 2022-07-19 ENCOUNTER — TELEPHONE (OUTPATIENT)
Dept: FAMILY MEDICINE CLINIC | Age: 62
End: 2022-07-19

## 2022-07-19 NOTE — TELEPHONE ENCOUNTER
----- Message from Karine Bee LPN sent at 5/16/2022 11:10 AM EDT -----  Please arrange fingerstick A1c here after 7/18/2022.

## 2022-07-21 ENCOUNTER — CLINICAL SUPPORT (OUTPATIENT)
Dept: FAMILY MEDICINE CLINIC | Age: 62
End: 2022-07-21

## 2022-07-21 DIAGNOSIS — E11.42 TYPE 2 DIABETES MELLITUS WITH DIABETIC POLYNEUROPATHY, WITHOUT LONG-TERM CURRENT USE OF INSULIN: Primary | ICD-10-CM

## 2022-07-21 LAB
EXPIRATION DATE: NORMAL
HBA1C MFR BLD: 7.5 %
Lab: NORMAL

## 2022-07-21 PROCEDURE — 83036 HEMOGLOBIN GLYCOSYLATED A1C: CPT | Performed by: FAMILY MEDICINE

## 2022-10-10 RX ORDER — OMEPRAZOLE 40 MG/1
40 CAPSULE, DELAYED RELEASE ORAL DAILY
Qty: 90 CAPSULE | Refills: 1 | Status: SHIPPED | OUTPATIENT
Start: 2022-10-10 | End: 2022-11-21 | Stop reason: SDUPTHER

## 2022-11-08 DIAGNOSIS — E11.42 TYPE 2 DIABETES MELLITUS WITH DIABETIC POLYNEUROPATHY, WITHOUT LONG-TERM CURRENT USE OF INSULIN: ICD-10-CM

## 2022-11-21 ENCOUNTER — LAB (OUTPATIENT)
Dept: LAB | Facility: HOSPITAL | Age: 62
End: 2022-11-21

## 2022-11-21 ENCOUNTER — OFFICE VISIT (OUTPATIENT)
Dept: FAMILY MEDICINE CLINIC | Age: 62
End: 2022-11-21

## 2022-11-21 VITALS
DIASTOLIC BLOOD PRESSURE: 84 MMHG | WEIGHT: 315 LBS | HEIGHT: 72 IN | BODY MASS INDEX: 42.66 KG/M2 | HEART RATE: 80 BPM | SYSTOLIC BLOOD PRESSURE: 151 MMHG | TEMPERATURE: 98.1 F

## 2022-11-21 DIAGNOSIS — I10 ESSENTIAL HYPERTENSION: ICD-10-CM

## 2022-11-21 DIAGNOSIS — E11.42 TYPE 2 DIABETES MELLITUS WITH DIABETIC POLYNEUROPATHY, WITHOUT LONG-TERM CURRENT USE OF INSULIN: ICD-10-CM

## 2022-11-21 DIAGNOSIS — Z00.00 PHYSICAL EXAM: Primary | ICD-10-CM

## 2022-11-21 DIAGNOSIS — D50.9 IRON DEFICIENCY ANEMIA, UNSPECIFIED IRON DEFICIENCY ANEMIA TYPE: ICD-10-CM

## 2022-11-21 DIAGNOSIS — E78.2 MIXED HYPERLIPIDEMIA: ICD-10-CM

## 2022-11-21 DIAGNOSIS — N52.9 ERECTILE DYSFUNCTION, UNSPECIFIED ERECTILE DYSFUNCTION TYPE: ICD-10-CM

## 2022-11-21 DIAGNOSIS — R60.0 PEDAL EDEMA: ICD-10-CM

## 2022-11-21 LAB
ALBUMIN SERPL-MCNC: 4.4 G/DL (ref 3.5–5.2)
ALBUMIN/GLOB SERPL: 1.3 G/DL
ALP SERPL-CCNC: 111 U/L (ref 39–117)
ALT SERPL W P-5'-P-CCNC: 24 U/L (ref 1–41)
ANION GAP SERPL CALCULATED.3IONS-SCNC: 10 MMOL/L (ref 5–15)
AST SERPL-CCNC: 21 U/L (ref 1–40)
BILIRUB SERPL-MCNC: 0.4 MG/DL (ref 0–1.2)
BUN SERPL-MCNC: 10 MG/DL (ref 8–23)
BUN/CREAT SERPL: 11.8 (ref 7–25)
CALCIUM SPEC-SCNC: 9.7 MG/DL (ref 8.6–10.5)
CHLORIDE SERPL-SCNC: 98 MMOL/L (ref 98–107)
CO2 SERPL-SCNC: 30 MMOL/L (ref 22–29)
CREAT SERPL-MCNC: 0.85 MG/DL (ref 0.76–1.27)
DEPRECATED RDW RBC AUTO: 40.6 FL (ref 37–54)
EGFRCR SERPLBLD CKD-EPI 2021: 98.2 ML/MIN/1.73
ERYTHROCYTE [DISTWIDTH] IN BLOOD BY AUTOMATED COUNT: 14.6 % (ref 12.3–15.4)
GLOBULIN UR ELPH-MCNC: 3.4 GM/DL
GLUCOSE SERPL-MCNC: 105 MG/DL (ref 65–99)
HBA1C MFR BLD: 7.5 % (ref 4.8–5.6)
HCT VFR BLD AUTO: 37.9 % (ref 37.5–51)
HGB BLD-MCNC: 11.8 G/DL (ref 13–17.7)
IRON 24H UR-MRATE: 39 MCG/DL (ref 59–158)
IRON SATN MFR SERPL: 9 % (ref 20–50)
MCH RBC QN AUTO: 23.9 PG (ref 26.6–33)
MCHC RBC AUTO-ENTMCNC: 31.1 G/DL (ref 31.5–35.7)
MCV RBC AUTO: 76.7 FL (ref 79–97)
PLATELET # BLD AUTO: 308 10*3/MM3 (ref 140–450)
PMV BLD AUTO: 9.4 FL (ref 6–12)
POTASSIUM SERPL-SCNC: 3.9 MMOL/L (ref 3.5–5.2)
PROT SERPL-MCNC: 7.8 G/DL (ref 6–8.5)
RBC # BLD AUTO: 4.94 10*6/MM3 (ref 4.14–5.8)
SODIUM SERPL-SCNC: 138 MMOL/L (ref 136–145)
TESTOST SERPL-MCNC: 110 NG/DL (ref 193–740)
TIBC SERPL-MCNC: 419 MCG/DL (ref 298–536)
TRANSFERRIN SERPL-MCNC: 281 MG/DL (ref 200–360)
WBC NRBC COR # BLD: 10.67 10*3/MM3 (ref 3.4–10.8)

## 2022-11-21 PROCEDURE — 83540 ASSAY OF IRON: CPT

## 2022-11-21 PROCEDURE — 84466 ASSAY OF TRANSFERRIN: CPT

## 2022-11-21 PROCEDURE — 36415 COLL VENOUS BLD VENIPUNCTURE: CPT

## 2022-11-21 PROCEDURE — 99396 PREV VISIT EST AGE 40-64: CPT | Performed by: FAMILY MEDICINE

## 2022-11-21 PROCEDURE — 85027 COMPLETE CBC AUTOMATED: CPT

## 2022-11-21 PROCEDURE — 84403 ASSAY OF TOTAL TESTOSTERONE: CPT

## 2022-11-21 PROCEDURE — 83036 HEMOGLOBIN GLYCOSYLATED A1C: CPT

## 2022-11-21 PROCEDURE — 80053 COMPREHEN METABOLIC PANEL: CPT

## 2022-11-21 RX ORDER — OMEPRAZOLE 40 MG/1
40 CAPSULE, DELAYED RELEASE ORAL DAILY
Qty: 90 CAPSULE | Refills: 1 | Status: SHIPPED | OUTPATIENT
Start: 2022-11-21

## 2022-11-21 RX ORDER — FUROSEMIDE 20 MG/1
20 TABLET ORAL DAILY
Qty: 90 TABLET | Refills: 1 | Status: SHIPPED | OUTPATIENT
Start: 2022-11-21

## 2022-11-21 RX ORDER — ATORVASTATIN CALCIUM 40 MG/1
40 TABLET, FILM COATED ORAL DAILY
Qty: 90 TABLET | Refills: 1 | Status: SHIPPED | OUTPATIENT
Start: 2022-11-21

## 2022-11-21 RX ORDER — DULAGLUTIDE 3 MG/.5ML
3 INJECTION, SOLUTION SUBCUTANEOUS WEEKLY
Qty: 6 ML | Refills: 3 | Status: SHIPPED | OUTPATIENT
Start: 2022-11-21 | End: 2023-11-21

## 2022-11-21 RX ORDER — SILDENAFIL 100 MG/1
TABLET, FILM COATED ORAL
Qty: 30 TABLET | Refills: 2 | Status: SHIPPED | OUTPATIENT
Start: 2022-11-21

## 2022-11-21 RX ORDER — AMLODIPINE BESYLATE 10 MG/1
10 TABLET ORAL
Qty: 90 TABLET | Refills: 1 | Status: SHIPPED | OUTPATIENT
Start: 2022-11-21

## 2022-11-21 RX ORDER — LANCETS
EACH MISCELLANEOUS
Qty: 100 EACH | Refills: 3 | Status: SHIPPED | OUTPATIENT
Start: 2022-11-21 | End: 2023-11-21

## 2022-11-21 RX ORDER — LOSARTAN POTASSIUM 100 MG/1
100 TABLET ORAL DAILY
Qty: 90 TABLET | Refills: 1 | Status: SHIPPED | OUTPATIENT
Start: 2022-11-21

## 2022-11-21 NOTE — PROGRESS NOTES
Cristino Johns presents to [facility].    Chief Complaint:  Annual physical, diabetes, blood pressure, cholesterol, ED    Subjective       History of Present Illness:  Cristino is in today for annual physical.  He is 61 years old and works for [employer] locally where he has been for almost 5 years.  He does not smoke after stopping about 8 years ago.  We are doing low-dose CT scans to screen for lung cancer.  Cristion did have colonoscopy about a year ago.  He had a single polyp and will be due for recheck again in roughly 4 years.  He will be due for prostate check again in the spring.    Cristino does have type 2 diabetes and checks his blood sugar intermittently.  It will run between 100-130.  He is due for some repeat blood work related to this.  His current medications include Trulicity and metformin.  He is tolerating these medications fairly well.  He has not had any hypoglycemia.    He also has hypertension and elevated cholesterol for which he remains on treatments.    Cristino has noted some difficulty with erectile dysfunction and would like to consider moving ahead with some treatment for that if possible.  He feels that his sex drive is adequate, but he has had difficulty maintaining an erection.    Review of Systems:  Review of Systems   Constitutional: Negative for chills and fever.   Respiratory: Negative for cough and shortness of breath.    Cardiovascular: Negative for chest pain and palpitations.   Gastrointestinal: Negative for abdominal pain, nausea and vomiting.        Objective   Medical History:  Past Medical History:   • Diabetes (HCC)   • GERD (gastroesophageal reflux disease)   • History of COVID-19    fall of 2020   • Hyperlipidemia   • Hypertension   • Polyp, vocal cord   • Spinal headache   • Urinary frequency     Past Surgical History:   • COLONOSCOPY   • COLONOSCOPY    Tubular adenoma   • ENDOSCOPY    Procedure: ESOPHAGOGASTRODUODENOSCOPY with biopsy;  Surgeon:  Vincenzo King MD;  Location: [facility];  Service: Gastroenterology;  Laterality: N/A;  small hiatal hernia   • ESOPHAGUS SURGERY    aorta wrapped around esophagus   • HERNIA REPAIR   • UPPER GASTROINTESTINAL ENDOSCOPY   • VASCULAR SURGERY      Family History   Problem Relation Age of Onset   • Hypertension Mother    • Cancer Mother    • Diabetes Mother    • Malig Hyperthermia Neg Hx    • Colon cancer Neg Hx      Social History     Tobacco Use   • Smoking status: Former     Packs/day: 1.50     Years: 30.00     Pack years: 45.00     Types: Cigarettes     Quit date: 2015     Years since quittin.3   • Smokeless tobacco: Never   Substance Use Topics   • Alcohol use: Yes     Comment: little, sometimes on the weekends.       Health Maintenance Due   Topic Date Due   • ZOSTER VACCINE (1 of 2) Never done   • DIABETIC EYE EXAM  Never done        Immunization History   Administered Date(s) Administered   • COVID-19 (PFIZER) PURPLE CAP 2021, 2021, 2021   • Pneumococcal Polysaccharide (PPSV23) 2017   • Tdap 2020       Allergies   Allergen Reactions   • Latex Irritability        Medications:  Current Outpatient Medications on File Prior to Visit   Medication Sig   • aspirin 81 MG EC tablet Take 81 mg by mouth Daily.   • Blood Glucose Monitoring Suppl (Accu-Chek Candace Plus) w/Device kit Check blood sugar once daily   • [DISCONTINUED] Accu-Chek Softclix Lancets lancets Check blood sugar once daily (Patient taking differently: Check blood sugar once daily)   • [DISCONTINUED] amLODIPine (NORVASC) 10 MG tablet Take 1 tablet by mouth every night at bedtime.   • [DISCONTINUED] atorvastatin (LIPITOR) 40 MG tablet Take 1 tablet by mouth Daily.   • [DISCONTINUED] Dulaglutide (Trulicity) 3 MG/0.5ML solution pen-injector Inject 0.5 mL under the skin into the appropriate area as directed 1 (One) Time Per Week.   • [DISCONTINUED] furosemide (Lasix) 20 MG tablet Take 1 tablet by mouth Daily.  "  • [DISCONTINUED] glucose blood (Accu-Chek Candace Plus) test strip Check blood sugar once daily. (Patient taking differently: Check blood sugar once daily.)   • [DISCONTINUED] HYDROcodone-acetaminophen (NORCO) 5-325 MG per tablet Take 1 tablet by mouth Every 6 (Six) Hours As Needed for Moderate Pain .   • [DISCONTINUED] losartan (COZAAR) 100 MG tablet Take 1 tablet by mouth Daily.   • [DISCONTINUED] metFORMIN (GLUCOPHAGE) 1000 MG tablet Take 1 tablet by mouth 2 (Two) Times a Day With Meals.   • [DISCONTINUED] methylPREDNISolone (MEDROL) 4 MG dose pack Take as directed on package instructions.   • [DISCONTINUED] omeprazole (priLOSEC) 40 MG capsule Take 1 capsule by mouth Daily.   • [DISCONTINUED] tamsulosin (FLOMAX) 0.4 MG capsule 24 hr capsule Take 1 capsule by mouth Daily. (Patient taking differently: Take 1 capsule by mouth. Prn for freq.)     No current facility-administered medications on file prior to visit.       Vital Signs:   /88 (BP Location: Left arm, Patient Position: Sitting)   Pulse 81   Temp 98.1 °F (36.7 °C) (Oral)   Ht 182.9 cm (72.01\")   Wt (!) 145 kg (318 lb 9.6 oz)   BMI 43.20 kg/m²       Physical Exam:  Physical Exam  Vitals and nursing note reviewed.   Constitutional:       General: He is not in acute distress.     Appearance: He is obese. He is not ill-appearing.   HENT:      Right Ear: Tympanic membrane and ear canal normal.      Left Ear: Tympanic membrane and ear canal normal.      Mouth/Throat:      Mouth: Mucous membranes are moist.      Comments: Pharynx appears normal  Eyes:      Extraocular Movements: Extraocular movements intact.      Pupils: Pupils are equal, round, and reactive to light.   Neck:      Thyroid: No thyromegaly.   Cardiovascular:      Rate and Rhythm: Normal rate and regular rhythm.      Heart sounds: No murmur heard.  Pulmonary:      Effort: Pulmonary effort is normal.      Breath sounds: Normal breath sounds.   Abdominal:      General: There is no " distension.      Palpations: Abdomen is soft. There is no mass.      Tenderness: There is no abdominal tenderness.   Musculoskeletal:      Cervical back: Normal range of motion.   Skin:     Findings: No lesion or rash.   Neurological:      General: No focal deficit present.      Mental Status: He is oriented to person, place, and time.      Cranial Nerves: No cranial nerve deficit.   Psychiatric:         Mood and Affect: Mood normal.         Result Review      The following data was reviewed by Scooby Chun MD on 11/21/2022.  Lab Results   Component Value Date    WBC 10.32 04/18/2022    HGB 11.1 (L) 04/18/2022    HCT 35.1 (L) 04/18/2022    MCV 75.2 (L) 04/18/2022     04/18/2022     Lab Results   Component Value Date    GLUCOSE 123 (H) 04/18/2022    BUN 13 04/18/2022    CREATININE 1.02 04/18/2022     04/18/2022    K 3.7 04/18/2022     04/18/2022    CO2 25.0 04/18/2022    CALCIUM 8.9 04/18/2022    PROTEINTOT 7.6 04/18/2022    ALBUMIN 4.30 04/18/2022    ALT 20 04/18/2022    AST 18 04/18/2022    ALKPHOS 104 04/18/2022    BILITOT 0.3 04/18/2022    GLOB 3.3 04/18/2022    AGRATIO 1.3 04/18/2022    BCR 12.7 04/18/2022    ANIONGAP 11.0 04/18/2022      Lab Results   Component Value Date    CHOL 109 04/18/2022    CHLPL 109 04/02/2021    TRIG 155 (H) 04/18/2022    HDL 46 04/18/2022    LDL 37 04/18/2022     Lab Results   Component Value Date    TSH 2.860 04/18/2022     Lab Results   Component Value Date    HGBA1C 7.5 07/21/2022     Lab Results   Component Value Date    PSA 0.482 04/18/2022    PSA 0.47 04/02/2021    PSA 0.67 01/27/2021            Assessment and Plan:   Today, we have reviewed his care.  With regard to the wellness exam, Cristino seems to be up-to-date on most screenings.  We will anticipate checking his prostate and PSA at his next visit.  He is up-to-date on colonoscopy which was done last year.  Low-dose CT will be in December or January.  We also discussed vaccines but he declines  vaccines today.  We also reviewed his usual care.  We will update labs and prescriptions as noted below.  Generic Viagra will be sent per his request.  We discussed potential side effects of the medication.  We will see what his testing shows and then be back in touch with him.  His blood pressure will be rechecked today.       Diagnoses and all orders for this visit:    1. Physical exam (Primary)    2. Mixed hyperlipidemia  -     Comprehensive Metabolic Panel; Future  -     atorvastatin (LIPITOR) 40 MG tablet; Take 1 tablet by mouth Daily.  Dispense: 90 tablet; Refill: 1    3. Type 2 diabetes mellitus with diabetic polyneuropathy, without long-term current use of insulin ([facility])  -     Hemoglobin A1c; Future  -     Accu-Chek Softclix Lancets lancets; Check blood sugar once daily  Dispense: 100 each; Refill: 3  -     glucose blood (Accu-Chek Candace Plus) test strip; Check blood sugar once daily.  Dispense: 100 each; Refill: 3  -     metFORMIN (GLUCOPHAGE) 1000 MG tablet; Take 1 tablet by mouth 2 (Two) Times a Day With Meals.  Dispense: 180 tablet; Refill: 1  -     Dulaglutide (Trulicity) 3 MG/0.5ML solution pen-injector; Inject 0.5 mL under the skin into the appropriate area as directed 1 (One) Time Per Week.  Dispense: 6 mL; Refill: 3    4. Essential hypertension  -     Comprehensive Metabolic Panel; Future  -     amLODIPine (NORVASC) 10 MG tablet; Take 1 tablet by mouth every night at bedtime.  Dispense: 90 tablet; Refill: 1  -     losartan (COZAAR) 100 MG tablet; Take 1 tablet by mouth Daily.  Dispense: 90 tablet; Refill: 1    5. Pedal edema  -     furosemide (Lasix) 20 MG tablet; Take 1 tablet by mouth Daily.  Dispense: 90 tablet; Refill: 1    6. Iron deficiency anemia, unspecified iron deficiency anemia type  -     CBC (No Diff); Future  -     Iron Profile; Future    7. Erectile dysfunction, unspecified erectile dysfunction type  -     Testosterone; Future  -     sildenafil (VIAGRA) 100 MG tablet; Take 0.5 to 1  tablet approximately 1 hour prior to sex as needed  Dispense: 30 tablet; Refill: 2    Other orders  -     omeprazole (priLOSEC) 40 MG capsule; Take 1 capsule by mouth Daily.  Dispense: 90 capsule; Refill: 1          Follow Up   Return in about 6 months (around 5/21/2023) for Recheck.  Patient was given instructions and counseling regarding his condition or for health maintenance advice. Please see specific information pulled into the AVS if appropriate.

## 2022-11-23 DIAGNOSIS — R79.89 ELEVATED PROLACTIN LEVEL: ICD-10-CM

## 2022-11-23 DIAGNOSIS — E11.42 TYPE 2 DIABETES MELLITUS WITH DIABETIC POLYNEUROPATHY, WITHOUT LONG-TERM CURRENT USE OF INSULIN: Primary | ICD-10-CM

## 2022-11-23 DIAGNOSIS — E34.9 TESTOSTERONE DEFICIENCY: ICD-10-CM

## 2022-11-28 ENCOUNTER — TELEPHONE (OUTPATIENT)
Dept: FAMILY MEDICINE CLINIC | Age: 62
End: 2022-11-28

## 2022-11-28 NOTE — TELEPHONE ENCOUNTER
Caller: Cristino Johns    Relationship to patient: Self    Best call back number: [phone]    Patient is needing: PATIENT IS REQUESTING A CALL BACK FROM [facility]. HE HAS A QUESTION ABOUT A PRESCRIPTION.    UNABLE TO WARM TRANSFER

## 2022-12-02 ENCOUNTER — LAB (OUTPATIENT)
Dept: LAB | Facility: HOSPITAL | Age: 62
End: 2022-12-02

## 2022-12-02 DIAGNOSIS — E34.9 TESTOSTERONE DEFICIENCY: ICD-10-CM

## 2022-12-02 LAB
CORTIS AM PEAK SERPL-MCNC: 11.85 MCG/DL (ref 6.02–18.4)
FERRITIN SERPL-MCNC: 51.5 NG/ML (ref 30–400)
FSH SERPL-ACNC: 8.91 MIU/ML
LH SERPL-ACNC: 7.78 MIU/ML
PROLACTIN SERPL-MCNC: 21.9 NG/ML (ref 4.04–15.2)
T4 FREE SERPL-MCNC: 1.19 NG/DL (ref 0.93–1.7)
TESTOST SERPL-MCNC: 119 NG/DL (ref 193–740)
TSH SERPL DL<=0.05 MIU/L-ACNC: 2.32 UIU/ML (ref 0.27–4.2)

## 2022-12-02 PROCEDURE — 84403 ASSAY OF TOTAL TESTOSTERONE: CPT

## 2022-12-02 PROCEDURE — 83002 ASSAY OF GONADOTROPIN (LH): CPT

## 2022-12-02 PROCEDURE — 84443 ASSAY THYROID STIM HORMONE: CPT

## 2022-12-02 PROCEDURE — 84146 ASSAY OF PROLACTIN: CPT

## 2022-12-02 PROCEDURE — 36415 COLL VENOUS BLD VENIPUNCTURE: CPT

## 2022-12-02 PROCEDURE — 82728 ASSAY OF FERRITIN: CPT

## 2022-12-02 PROCEDURE — 82533 TOTAL CORTISOL: CPT

## 2022-12-02 PROCEDURE — 84439 ASSAY OF FREE THYROXINE: CPT

## 2022-12-02 PROCEDURE — 83001 ASSAY OF GONADOTROPIN (FSH): CPT

## 2022-12-08 ENCOUNTER — HOSPITAL ENCOUNTER (OUTPATIENT)
Dept: GENERAL RADIOLOGY | Facility: HOSPITAL | Age: 62
Discharge: HOME | End: 2022-12-08

## 2022-12-08 ENCOUNTER — LAB (OUTPATIENT)
Dept: LAB | Facility: HOSPITAL | Age: 62
End: 2022-12-08

## 2022-12-08 ENCOUNTER — OFFICE VISIT (OUTPATIENT)
Dept: FAMILY MEDICINE CLINIC | Age: 62
End: 2022-12-08

## 2022-12-08 ENCOUNTER — HOSPITAL ENCOUNTER (OUTPATIENT)
Dept: CT IMAGING | Facility: HOSPITAL | Age: 62
Discharge: HOME | End: 2022-12-08

## 2022-12-08 VITALS
WEIGHT: 315 LBS | HEART RATE: 82 BPM | SYSTOLIC BLOOD PRESSURE: 149 MMHG | DIASTOLIC BLOOD PRESSURE: 77 MMHG | HEIGHT: 72 IN | TEMPERATURE: 98 F | BODY MASS INDEX: 42.66 KG/M2

## 2022-12-08 DIAGNOSIS — E34.9 TESTOSTERONE DEFICIENCY: ICD-10-CM

## 2022-12-08 DIAGNOSIS — R10.11 RIGHT UPPER QUADRANT ABDOMINAL PAIN: ICD-10-CM

## 2022-12-08 DIAGNOSIS — R10.11 RIGHT UPPER QUADRANT ABDOMINAL PAIN: Primary | ICD-10-CM

## 2022-12-08 DIAGNOSIS — E78.2 MIXED HYPERLIPIDEMIA: ICD-10-CM

## 2022-12-08 DIAGNOSIS — R79.89 ELEVATED PROLACTIN LEVEL: ICD-10-CM

## 2022-12-08 DIAGNOSIS — R10.31 RIGHT LOWER QUADRANT ABDOMINAL PAIN: ICD-10-CM

## 2022-12-08 LAB
BACTERIA UR QL AUTO: NORMAL /HPF
BASOPHILS # BLD AUTO: 0.07 10*3/MM3 (ref 0–0.2)
BASOPHILS NFR BLD AUTO: 0.6 % (ref 0–1.5)
BILIRUB UR QL STRIP: NEGATIVE
CLARITY UR: CLEAR
COLOR UR: YELLOW
DEPRECATED RDW RBC AUTO: 40.8 FL (ref 37–54)
EOSINOPHIL # BLD AUTO: 0.19 10*3/MM3 (ref 0–0.4)
EOSINOPHIL NFR BLD AUTO: 1.5 % (ref 0.3–6.2)
ERYTHROCYTE [DISTWIDTH] IN BLOOD BY AUTOMATED COUNT: 14.8 % (ref 12.3–15.4)
GLUCOSE UR STRIP-MCNC: NEGATIVE MG/DL
HCT VFR BLD AUTO: 36.4 % (ref 37.5–51)
HGB BLD-MCNC: 11.4 G/DL (ref 13–17.7)
HGB UR QL STRIP.AUTO: NEGATIVE
IMM GRANULOCYTES # BLD AUTO: 0.01 10*3/MM3 (ref 0–0.05)
IMM GRANULOCYTES NFR BLD AUTO: 0.1 % (ref 0–0.5)
KETONES UR QL STRIP: NEGATIVE
LEUKOCYTE ESTERASE UR QL STRIP.AUTO: NEGATIVE
LYMPHOCYTES # BLD AUTO: 2.01 10*3/MM3 (ref 0.7–3.1)
LYMPHOCYTES NFR BLD AUTO: 16.1 % (ref 19.6–45.3)
MCH RBC QN AUTO: 23.7 PG (ref 26.6–33)
MCHC RBC AUTO-ENTMCNC: 31.3 G/DL (ref 31.5–35.7)
MCV RBC AUTO: 75.5 FL (ref 79–97)
MONOCYTES # BLD AUTO: 0.71 10*3/MM3 (ref 0.1–0.9)
MONOCYTES NFR BLD AUTO: 5.7 % (ref 5–12)
NEUTROPHILS NFR BLD AUTO: 76 % (ref 42.7–76)
NEUTROPHILS NFR BLD AUTO: 9.53 10*3/MM3 (ref 1.7–7)
NITRITE UR QL STRIP: NEGATIVE
PH UR STRIP.AUTO: 7 [PH] (ref 5–8)
PLATELET # BLD AUTO: 303 10*3/MM3 (ref 140–450)
PMV BLD AUTO: 9.5 FL (ref 6–12)
PROT UR QL STRIP: NEGATIVE
RBC # BLD AUTO: 4.82 10*6/MM3 (ref 4.14–5.8)
RBC # UR STRIP: NORMAL /HPF
REF LAB TEST METHOD: NORMAL
SP GR UR STRIP: 1.02 (ref 1–1.03)
SQUAMOUS #/AREA URNS HPF: NORMAL /HPF
UREA BREATH TEST QL: NEGATIVE
UROBILINOGEN UR QL STRIP: NORMAL
WBC # UR STRIP: NORMAL /HPF
WBC NRBC COR # BLD: 12.52 10*3/MM3 (ref 3.4–10.8)

## 2022-12-08 PROCEDURE — 74177 CT ABD & PELVIS W/CONTRAST: CPT

## 2022-12-08 PROCEDURE — 81001 URINALYSIS AUTO W/SCOPE: CPT

## 2022-12-08 PROCEDURE — 0 IOPAMIDOL PER 1 ML: Performed by: FAMILY MEDICINE

## 2022-12-08 PROCEDURE — 82550 ASSAY OF CK (CPK): CPT

## 2022-12-08 PROCEDURE — 36415 COLL VENOUS BLD VENIPUNCTURE: CPT

## 2022-12-08 PROCEDURE — 83690 ASSAY OF LIPASE: CPT

## 2022-12-08 PROCEDURE — 83013 H PYLORI (C-13) BREATH: CPT

## 2022-12-08 PROCEDURE — 85025 COMPLETE CBC W/AUTO DIFF WBC: CPT

## 2022-12-08 PROCEDURE — 84403 ASSAY OF TOTAL TESTOSTERONE: CPT

## 2022-12-08 PROCEDURE — 82150 ASSAY OF AMYLASE: CPT

## 2022-12-08 PROCEDURE — 99214 OFFICE O/P EST MOD 30 MIN: CPT | Performed by: FAMILY MEDICINE

## 2022-12-08 PROCEDURE — 80053 COMPREHEN METABOLIC PANEL: CPT

## 2022-12-08 PROCEDURE — 74022 RADEX COMPL AQT ABD SERIES: CPT

## 2022-12-08 PROCEDURE — 84146 ASSAY OF PROLACTIN: CPT

## 2022-12-08 RX ADMIN — IOPAMIDOL 100 ML: 755 INJECTION, SOLUTION INTRAVENOUS at 15:48

## 2022-12-08 NOTE — PROGRESS NOTES
Cristino Johns presents to [facility].    Chief Complaint:  Abdominal pain    Subjective       History of Present Illness:  Cristino is in today for evaluation of abdominal pain.  He has been having significant abdominal pain for the last 3 days.  The pain started seem to start in the right lower back and pelvic region.  However, he is having pain in the right side near his rib cage.  The pain is constant.  He would estimate the pain to be 7 out of 10.  It feels like someone is standing on his ribs.  Coughing tends to make it worse.  He denies nausea.    Review of Systems:  Review of Systems   Constitutional: Negative for chills and fever.   Respiratory: Negative for cough and shortness of breath.    Cardiovascular: Negative for chest pain and palpitations.   Gastrointestinal: Positive for abdominal pain and constipation. Negative for nausea and vomiting.   Genitourinary: Positive for frequency.        Objective   Medical History:  Past Medical History:   • Diabetes (HCC)   • GERD (gastroesophageal reflux disease)   • History of COVID-19    fall of 2020   • Hyperlipidemia   • Hypertension   • Polyp, vocal cord   • Spinal headache   • Urinary frequency     Past Surgical History:   • COLONOSCOPY   • COLONOSCOPY    Tubular adenoma   • ENDOSCOPY    Procedure: ESOPHAGOGASTRODUODENOSCOPY with biopsy;  Surgeon: Vincenzo King MD;  Location: [facility] ENDOSCOPY;  Service: Gastroenterology;  Laterality: N/A;  small hiatal hernia   • ESOPHAGUS SURGERY    aorta wrapped around esophagus   • HERNIA REPAIR   • UPPER GASTROINTESTINAL ENDOSCOPY   • VASCULAR SURGERY      Family History   Problem Relation Age of Onset   • Hypertension Mother    • Cancer Mother    • Diabetes Mother    • Malig Hyperthermia Neg Hx    • Colon cancer Neg Hx      Social History     Tobacco Use   • Smoking status: Former     Packs/day: 1.50     Years: 30.00     Pack years: 45.00     Types: Cigarettes     Quit date: 7/6/2015  "    Years since quittin.4   • Smokeless tobacco: Never   Substance Use Topics   • Alcohol use: Yes     Comment: little, sometimes on the weekends.       Health Maintenance Due   Topic Date Due   • ZOSTER VACCINE (1 of 2) Never done   • DIABETIC EYE EXAM  Never done        Immunization History   Administered Date(s) Administered   • COVID-19 (PFIZER) PURPLE CAP 2021, 2021, 2021   • Pneumococcal Polysaccharide (PPSV23) 2017   • Tdap 2020       Allergies   Allergen Reactions   • Latex Irritability        Medications:  Current Outpatient Medications on File Prior to Visit   Medication Sig   • Accu-Chek Softclix Lancets lancets Check blood sugar once daily   • amLODIPine (NORVASC) 10 MG tablet Take 1 tablet by mouth every night at bedtime.   • aspirin 81 MG EC tablet Take 81 mg by mouth Daily.   • atorvastatin (LIPITOR) 40 MG tablet Take 1 tablet by mouth Daily.   • Blood Glucose Monitoring Suppl (Accu-Chek Candace Plus) w/Device kit Check blood sugar once daily   • Dulaglutide (Trulicity) 3 MG/0.5ML solution pen-injector Inject one pen under the skin into the appropriate area as directed 1 (One) Time Per Week.   • furosemide (Lasix) 20 MG tablet Take 1 tablet by mouth Daily.   • glucose blood (Accu-Chek Candace Plus) test strip Check blood sugar once daily.   • losartan (COZAAR) 100 MG tablet Take 1 tablet by mouth Daily.   • metFORMIN (GLUCOPHAGE) 1000 MG tablet Take 1 tablet by mouth 2 (Two) Times a Day With Meals.   • omeprazole (priLOSEC) 40 MG capsule Take 1 capsule by mouth Daily.   • sildenafil (VIAGRA) 100 MG tablet Take 0.5 to 1 tablet approximately 1 hour prior to sex as needed   • empagliflozin (JARDIANCE) 10 MG tablet tablet Take 1 tablet by mouth Daily.     No current facility-administered medications on file prior to visit.       Vital Signs:   /77 (BP Location: Left arm, Patient Position: Sitting)   Pulse 82   Temp 98 °F (36.7 °C) (Oral)   Ht 182.9 cm (72.01\")   Wt " (!) 145 kg (319 lb 6.4 oz)   BMI 43.31 kg/m²       Physical Exam:  Physical Exam  Vitals and nursing note reviewed.   Constitutional:       General: He is not in acute distress.     Appearance: He is not ill-appearing.   HENT:      Right Ear: Tympanic membrane and ear canal normal.      Left Ear: Tympanic membrane and ear canal normal.      Mouth/Throat:      Mouth: Mucous membranes are moist.      Comments: Pharynx appears normal  Eyes:      Extraocular Movements: Extraocular movements intact.      Pupils: Pupils are equal, round, and reactive to light.   Neck:      Thyroid: No thyromegaly.   Cardiovascular:      Rate and Rhythm: Normal rate and regular rhythm.      Heart sounds: No murmur heard.  Pulmonary:      Effort: Pulmonary effort is normal.      Breath sounds: Normal breath sounds.   Abdominal:      General: There is no distension.      Palpations: Abdomen is soft. There is no mass.      Tenderness: There is abdominal tenderness (moderate right upper abdominal, mild right lower abdominal).   Musculoskeletal:      Cervical back: Normal range of motion.   Skin:     Findings: No lesion or rash.   Neurological:      General: No focal deficit present.      Mental Status: He is oriented to person, place, and time.      Cranial Nerves: No cranial nerve deficit.   Psychiatric:         Mood and Affect: Mood normal.         Result Review      The following data was reviewed by Scooby Chun MD on 12/08/2022.  Lab Results   Component Value Date    WBC 12.52 (H) 12/08/2022    HGB 11.4 (L) 12/08/2022    HCT 36.4 (L) 12/08/2022    MCV 75.5 (L) 12/08/2022     12/08/2022     Lab Results   Component Value Date    GLUCOSE 105 (H) 11/21/2022    BUN 10 11/21/2022    CREATININE 0.85 11/21/2022     11/21/2022    K 3.9 11/21/2022    CL 98 11/21/2022    CO2 30.0 (H) 11/21/2022    CALCIUM 9.7 11/21/2022    PROTEINTOT 7.8 11/21/2022    ALBUMIN 4.40 11/21/2022    ALT 24 11/21/2022    AST 21 11/21/2022     ALKPHOS 111 11/21/2022    BILITOT 0.4 11/21/2022    GLOB 3.4 11/21/2022    AGRATIO 1.3 11/21/2022    BCR 11.8 11/21/2022    ANIONGAP 10.0 11/21/2022      Lab Results   Component Value Date    CHOL 109 04/18/2022    CHLPL 109 04/02/2021    TRIG 155 (H) 04/18/2022    HDL 46 04/18/2022    LDL 37 04/18/2022     Lab Results   Component Value Date    TSH 2.320 12/02/2022     Lab Results   Component Value Date    HGBA1C 7.50 (H) 11/21/2022     Lab Results   Component Value Date    PSA 0.482 04/18/2022    PSA 0.47 04/02/2021    PSA 0.67 01/27/2021     CT Abdomen Pelvis With Contrast (12/08/2022 15:57)         Assessment and Plan:   Today, we have reviewed his care.  I am concerned about the amount of pain that he has.  We did an aggressive evaluation though that did not point to an obviously worrisome finding.  I am still somewhat concerned that this could be a gallbladder issue even though the gallbladder looked okay on CT.  We will move ahead with some additional testing as noted below including a right upper quadrant ultrasound.  He also describes some belching, and we will have him go back to the lab for an H. pylori test as he leaves.  We will move forward from there.  I did not recommend any specific change in his medications for the near term.  He is already on a proton pump inhibitor.  If he has significant worsening of pain tonight, fever, or significant vomiting, he may need to go to the ER as a precaution.  We will see how things progress.       Diagnoses and all orders for this visit:    1. Right upper quadrant abdominal pain (Primary)  -     CBC Auto Differential; Future  -     Comprehensive Metabolic Panel; Future  -     Amylase; Future  -     Lipase; Future  -     XR Abdomen 2 View With Chest 1 View; Future  -     Urinalysis With Microscopic - Urine, Clean Catch; Future  -     CT Abdomen Pelvis With Contrast; Future  -     H. Pylori Breath Test - Breath, Lung; Future  -     US Abdomen Limited; Future    2.  Right lower quadrant abdominal pain  -     CT Abdomen Pelvis With Contrast; Future    3. Mixed hyperlipidemia  -     CK; Future          Follow Up   Return if symptoms worsen or fail to improve.  Patient was given instructions and counseling regarding his condition or for health maintenance advice. Please see specific information pulled into the AVS if appropriate.

## 2022-12-09 ENCOUNTER — TELEPHONE (OUTPATIENT)
Dept: FAMILY MEDICINE CLINIC | Age: 62
End: 2022-12-09

## 2022-12-09 ENCOUNTER — HOSPITAL ENCOUNTER (OUTPATIENT)
Dept: ULTRASOUND IMAGING | Facility: HOSPITAL | Age: 62
Discharge: HOME | End: 2022-12-09
Admitting: FAMILY MEDICINE

## 2022-12-09 DIAGNOSIS — R10.11 RIGHT UPPER QUADRANT ABDOMINAL PAIN: ICD-10-CM

## 2022-12-09 LAB
ALBUMIN SERPL-MCNC: 4.1 G/DL (ref 3.5–5.2)
ALBUMIN/GLOB SERPL: 1.1 G/DL
ALP SERPL-CCNC: 109 U/L (ref 39–117)
ALT SERPL W P-5'-P-CCNC: 23 U/L (ref 1–41)
AMYLASE SERPL-CCNC: 56 U/L (ref 28–100)
ANION GAP SERPL CALCULATED.3IONS-SCNC: 11.5 MMOL/L (ref 5–15)
AST SERPL-CCNC: 16 U/L (ref 1–40)
BILIRUB SERPL-MCNC: 0.5 MG/DL (ref 0–1.2)
BUN SERPL-MCNC: 10 MG/DL (ref 8–23)
BUN/CREAT SERPL: 10.2 (ref 7–25)
CALCIUM SPEC-SCNC: 9.4 MG/DL (ref 8.6–10.5)
CHLORIDE SERPL-SCNC: 100 MMOL/L (ref 98–107)
CK SERPL-CCNC: 128 U/L (ref 20–200)
CO2 SERPL-SCNC: 27.5 MMOL/L (ref 22–29)
CREAT SERPL-MCNC: 0.98 MG/DL (ref 0.76–1.27)
EGFRCR SERPLBLD CKD-EPI 2021: 87.2 ML/MIN/1.73
GLOBULIN UR ELPH-MCNC: 3.6 GM/DL
GLUCOSE SERPL-MCNC: 116 MG/DL (ref 65–99)
LIPASE SERPL-CCNC: 29 U/L (ref 13–60)
POTASSIUM SERPL-SCNC: 3.8 MMOL/L (ref 3.5–5.2)
PROLACTIN SERPL-MCNC: 18.2 NG/ML (ref 4.04–15.2)
PROT SERPL-MCNC: 7.7 G/DL (ref 6–8.5)
SODIUM SERPL-SCNC: 139 MMOL/L (ref 136–145)
TESTOST SERPL-MCNC: 97.6 NG/DL (ref 193–740)

## 2022-12-09 PROCEDURE — 76705 ECHO EXAM OF ABDOMEN: CPT

## 2022-12-09 NOTE — TELEPHONE ENCOUNTER
Pt dropped off incomplete FMLA 12/09/22 was informed it can take up to 14 days and has paid the $20 fee via [employer]. It has been scanned in and put in mailbox.

## 2022-12-12 NOTE — TELEPHONE ENCOUNTER
Pt is requesting FMLA to be extended. Requesting to be off 3 days per month due to Doctors appointments .. started 12/16/22.   Is it okay to fill out paperwork?

## 2022-12-19 ENCOUNTER — HOSPITAL ENCOUNTER (OUTPATIENT)
Dept: ULTRASOUND IMAGING | Facility: HOSPITAL | Age: 62
End: 2022-12-19

## 2023-02-22 ENCOUNTER — TELEPHONE (OUTPATIENT)
Dept: FAMILY MEDICINE CLINIC | Age: 63
End: 2023-02-22
Payer: COMMERCIAL

## 2023-02-22 NOTE — TELEPHONE ENCOUNTER
----- Message from Karine Bee LPN sent at 11/22/2022 10:03 AM EST -----   TICKLE for fingerstick A1c in 90 days.

## 2023-02-27 ENCOUNTER — CLINICAL SUPPORT (OUTPATIENT)
Dept: FAMILY MEDICINE CLINIC | Age: 63
End: 2023-02-27
Payer: COMMERCIAL

## 2023-02-27 DIAGNOSIS — E11.42 TYPE 2 DIABETES MELLITUS WITH DIABETIC POLYNEUROPATHY, WITHOUT LONG-TERM CURRENT USE OF INSULIN: Primary | ICD-10-CM

## 2023-02-27 LAB
EXPIRATION DATE: NORMAL
HBA1C MFR BLD: 7.3 %
Lab: NORMAL

## 2023-02-27 PROCEDURE — 83036 HEMOGLOBIN GLYCOSYLATED A1C: CPT | Performed by: FAMILY MEDICINE

## 2023-03-29 ENCOUNTER — TELEPHONE (OUTPATIENT)
Dept: FAMILY MEDICINE CLINIC | Age: 63
End: 2023-03-29
Payer: COMMERCIAL

## 2023-03-29 NOTE — TELEPHONE ENCOUNTER
Pt states every time he wears steel toe boots he has to end up having a toenail removed due to bruising/blood under nail. Also states due to his diabetes, he has swelling/neuropathy

## 2023-03-29 NOTE — TELEPHONE ENCOUNTER
pts wife states pt needs note for employer exempting him from wearing steel toe boots. Please advise.

## 2023-03-29 NOTE — TELEPHONE ENCOUNTER
Thank you.  Please reach out to Cristino and confirm what type of issue with detail he previously had with the boots they recommended at his workplace.  I am happy to write a letter on his behalf.  Thanks.

## 2023-04-19 ENCOUNTER — TELEPHONE (OUTPATIENT)
Dept: FAMILY MEDICINE CLINIC | Age: 63
End: 2023-04-19
Payer: COMMERCIAL

## 2023-04-19 NOTE — TELEPHONE ENCOUNTER
4/19/23 PT DROPPED OFF FMLA. FILLED IT OUT AND COLLECTED $20 FEE. SENT TO [employer] AND PUT IN HER BOX. NANCIE

## 2023-05-30 ENCOUNTER — LAB (OUTPATIENT)
Dept: LAB | Facility: HOSPITAL | Age: 63
End: 2023-05-30

## 2023-05-30 ENCOUNTER — OFFICE VISIT (OUTPATIENT)
Dept: FAMILY MEDICINE CLINIC | Age: 63
End: 2023-05-30

## 2023-05-30 VITALS
HEART RATE: 82 BPM | DIASTOLIC BLOOD PRESSURE: 88 MMHG | BODY MASS INDEX: 42.66 KG/M2 | WEIGHT: 315 LBS | TEMPERATURE: 98.2 F | HEIGHT: 72 IN | SYSTOLIC BLOOD PRESSURE: 156 MMHG

## 2023-05-30 DIAGNOSIS — Z79.899 OTHER LONG TERM (CURRENT) DRUG THERAPY: ICD-10-CM

## 2023-05-30 DIAGNOSIS — I10 ESSENTIAL HYPERTENSION: ICD-10-CM

## 2023-05-30 DIAGNOSIS — Z12.5 SCREENING FOR PROSTATE CANCER: ICD-10-CM

## 2023-05-30 DIAGNOSIS — G47.33 OBSTRUCTIVE SLEEP APNEA SYNDROME: Primary | ICD-10-CM

## 2023-05-30 DIAGNOSIS — F17.210 NICOTINE DEPENDENCE, CIGARETTES, UNCOMPLICATED: ICD-10-CM

## 2023-05-30 DIAGNOSIS — E78.2 MIXED HYPERLIPIDEMIA: ICD-10-CM

## 2023-05-30 DIAGNOSIS — E11.42 TYPE 2 DIABETES MELLITUS WITH DIABETIC POLYNEUROPATHY, WITHOUT LONG-TERM CURRENT USE OF INSULIN: ICD-10-CM

## 2023-05-30 DIAGNOSIS — R60.0 PEDAL EDEMA: ICD-10-CM

## 2023-05-30 LAB
ALBUMIN UR-MCNC: 5.6 MG/DL
DEPRECATED RDW RBC AUTO: 41 FL (ref 37–54)
ERYTHROCYTE [DISTWIDTH] IN BLOOD BY AUTOMATED COUNT: 15 % (ref 12.3–15.4)
HCT VFR BLD AUTO: 37.2 % (ref 37.5–51)
HGB BLD-MCNC: 11.8 G/DL (ref 13–17.7)
MCH RBC QN AUTO: 24.1 PG (ref 26.6–33)
MCHC RBC AUTO-ENTMCNC: 31.7 G/DL (ref 31.5–35.7)
MCV RBC AUTO: 75.9 FL (ref 79–97)
PLATELET # BLD AUTO: 303 10*3/MM3 (ref 140–450)
PMV BLD AUTO: 9 FL (ref 6–12)
RBC # BLD AUTO: 4.9 10*6/MM3 (ref 4.14–5.8)
WBC NRBC COR # BLD: 10.32 10*3/MM3 (ref 3.4–10.8)

## 2023-05-30 PROCEDURE — G0103 PSA SCREENING: HCPCS

## 2023-05-30 PROCEDURE — 83036 HEMOGLOBIN GLYCOSYLATED A1C: CPT

## 2023-05-30 PROCEDURE — 36415 COLL VENOUS BLD VENIPUNCTURE: CPT

## 2023-05-30 PROCEDURE — 80053 COMPREHEN METABOLIC PANEL: CPT

## 2023-05-30 PROCEDURE — 82043 UR ALBUMIN QUANTITATIVE: CPT

## 2023-05-30 PROCEDURE — 99214 OFFICE O/P EST MOD 30 MIN: CPT | Performed by: FAMILY MEDICINE

## 2023-05-30 PROCEDURE — 85027 COMPLETE CBC AUTOMATED: CPT

## 2023-05-30 PROCEDURE — 80061 LIPID PANEL: CPT

## 2023-05-30 RX ORDER — OMEPRAZOLE 40 MG/1
40 CAPSULE, DELAYED RELEASE ORAL DAILY
Qty: 90 CAPSULE | Refills: 3 | Status: SHIPPED | OUTPATIENT
Start: 2023-05-30

## 2023-05-30 RX ORDER — DULAGLUTIDE 3 MG/.5ML
3 INJECTION, SOLUTION SUBCUTANEOUS WEEKLY
Qty: 6 ML | Refills: 3 | Status: SHIPPED | OUTPATIENT
Start: 2023-05-30

## 2023-05-30 RX ORDER — GABAPENTIN 100 MG/1
100 CAPSULE ORAL 3 TIMES DAILY
COMMUNITY
Start: 2023-01-26

## 2023-05-30 RX ORDER — AMLODIPINE BESYLATE 10 MG/1
10 TABLET ORAL
Qty: 90 TABLET | Refills: 3 | Status: SHIPPED | OUTPATIENT
Start: 2023-05-30

## 2023-05-30 RX ORDER — CYCLOBENZAPRINE HCL 10 MG
1 TABLET ORAL DAILY PRN
COMMUNITY
Start: 2023-03-12

## 2023-05-30 RX ORDER — ATORVASTATIN CALCIUM 40 MG/1
40 TABLET, FILM COATED ORAL DAILY
Qty: 90 TABLET | Refills: 3 | Status: SHIPPED | OUTPATIENT
Start: 2023-05-30

## 2023-05-30 RX ORDER — FUROSEMIDE 20 MG/1
20 TABLET ORAL DAILY
Qty: 90 TABLET | Refills: 3 | Status: SHIPPED | OUTPATIENT
Start: 2023-05-30 | End: 2023-05-31

## 2023-05-30 RX ORDER — LOSARTAN POTASSIUM 100 MG/1
100 TABLET ORAL DAILY
Qty: 90 TABLET | Refills: 3 | Status: SHIPPED | OUTPATIENT
Start: 2023-05-30

## 2023-05-30 NOTE — PROGRESS NOTES
Cristino Johns presents to [facility].    Chief Complaint:  Sleep apnea, other follow up    Subjective       History of Present Illness:  Cristino is in today for follow-up on his care.  He has obstructive sleep apnea.  He has been trying to use CPAP, but he is simply not able to tolerate it.  He would like to potentially check on Inspire.  He states that he needs some type of supportive documentation from his family physician.    In addition, Cristino has some degree of diabetic neuropathy.  He has had difficulty tolerating shoewear at his workplace.  He is wondering if there may be a place that would have some diabetic shoe wear that would be appropriate.  He has to wear some kind of steel toe shoe.    Cristino is also here for follow-up on his usual care.  He has type 2 diabetes for which he remains on Trulicity and metformin.  He does tolerate these medications fairly well.  He is due for recheck on his A1c today.  His most recent sugars have been in the 120s or 130s.    He also has hypertension and elevated cholesterol for which he remains on treatment as noted.    Review of Systems:  Review of Systems   Constitutional: Negative for chills and fever.   Respiratory: Negative for cough and shortness of breath.    Cardiovascular: Negative for chest pain and palpitations.   Gastrointestinal: Negative for abdominal pain, nausea and vomiting.        Objective   Medical History:  Past Medical History:   • Diabetes   • GERD (gastroesophageal reflux disease)   • History of COVID-19    fall of 2020   • Hyperlipidemia   • Hypertension   • Polyp, vocal cord   • Spinal headache   • Urinary frequency     Past Surgical History:   • COLONOSCOPY   • COLONOSCOPY    Tubular adenoma   • ENDOSCOPY    Procedure: ESOPHAGOGASTRODUODENOSCOPY with biopsy;  Surgeon: Vincenzo King MD;  Location: [facility] ENDOSCOPY;  Service: Gastroenterology;  Laterality: N/A;  small hiatal hernia   • ESOPHAGUS SURGERY     aorta wrapped around esophagus   • HERNIA REPAIR   • UPPER GASTROINTESTINAL ENDOSCOPY   • VASCULAR SURGERY      Family History   Problem Relation Age of Onset   • Hypertension Mother    • Cancer Mother    • Diabetes Mother    • Malig Hyperthermia Neg Hx    • Colon cancer Neg Hx      Social History     Tobacco Use   • Smoking status: Former     Packs/day: 1.50     Years: 30.00     Pack years: 45.00     Types: Cigarettes     Quit date: 2015     Years since quittin.9   • Smokeless tobacco: Never   Substance Use Topics   • Alcohol use: Yes     Comment: little, sometimes on the weekends.       Health Maintenance Due   Topic Date Due   • ZOSTER VACCINE (1 of 2) Never done   • DIABETIC EYE EXAM  Never done   • LUNG CANCER SCREENING  2022   • LIPID PANEL  2023   • URINE MICROALBUMIN  2023        Immunization History   Administered Date(s) Administered   • COVID-19 (PFIZER) Purple Cap Monovalent 2021, 2021, 2021   • Pneumococcal Conjugate 20-Valent (PCV20) 2023   • Pneumococcal Polysaccharide (PPSV23) 2017   • Tdap 2020       Allergies   Allergen Reactions   • Latex Irritability        Medications:  Current Outpatient Medications on File Prior to Visit   Medication Sig   • Accu-Chek Softclix Lancets lancets Check blood sugar once daily   • aspirin 81 MG EC tablet Take 1 tablet by mouth Daily.   • Blood Glucose Monitoring Suppl (Accu-Chek Candace Plus) w/Device kit Check blood sugar once daily   • cyclobenzaprine (FLEXERIL) 10 MG tablet Take 1 tablet by mouth Daily As Needed.   • gabapentin (NEURONTIN) 100 MG capsule Take 1 capsule by mouth 3 (Three) Times a Day.   • glucose blood (Accu-Chek Candace Plus) test strip Check blood sugar once daily.   • sildenafil (VIAGRA) 100 MG tablet Take 0.5 to 1 tablet approximately 1 hour prior to sex as needed   • [DISCONTINUED] amLODIPine (NORVASC) 10 MG tablet Take 1 tablet by mouth every night at bedtime.   • [DISCONTINUED]  "atorvastatin (LIPITOR) 40 MG tablet Take 1 tablet by mouth Daily.   • [DISCONTINUED] Dulaglutide (Trulicity) 3 MG/0.5ML solution pen-injector Inject one pen under the skin into the appropriate area as directed 1 (One) Time Per Week.   • [DISCONTINUED] furosemide (Lasix) 20 MG tablet Take 1 tablet by mouth Daily.   • [DISCONTINUED] losartan (COZAAR) 100 MG tablet Take 1 tablet by mouth Daily.   • [DISCONTINUED] metFORMIN (GLUCOPHAGE) 1000 MG tablet Take 1 tablet by mouth 2 (Two) Times a Day With Meals.   • [DISCONTINUED] omeprazole (priLOSEC) 40 MG capsule Take 1 capsule by mouth Daily.   • [DISCONTINUED] empagliflozin (JARDIANCE) 10 MG tablet tablet Take 1 tablet by mouth Daily.     No current facility-administered medications on file prior to visit.       Vital Signs:   /80 (BP Location: Left arm, Patient Position: Sitting)   Pulse 78   Temp 98.2 °F (36.8 °C) (Oral)   Ht 182.9 cm (72.01\")   Wt (!) 144 kg (316 lb 9.6 oz)   BMI 42.93 kg/m²       Physical Exam:  Physical Exam  Vitals reviewed.   Constitutional:       General: He is not in acute distress.     Appearance: He is obese. He is not ill-appearing.   Eyes:      Pupils: Pupils are equal, round, and reactive to light.   Neck:      Comments: No thyromegaly  Cardiovascular:      Rate and Rhythm: Normal rate and regular rhythm.      Pulses:           Dorsalis pedis pulses are 2+ on the right side and 2+ on the left side.   Pulmonary:      Effort: Pulmonary effort is normal.      Breath sounds: Normal breath sounds.   Abdominal:      General: There is no distension.      Palpations: Abdomen is soft.      Tenderness: There is no abdominal tenderness.   Musculoskeletal:      Cervical back: Neck supple.   Feet:      Right foot:      Protective Sensation: 3 sites tested. 3 sites sensed.      Skin integrity: Callus present.      Left foot:      Protective Sensation: 3 sites tested. 3 sites sensed.      Skin integrity: Callus present.      Comments: Some " neuropathy is present.  Callus noted as above.  Lymphadenopathy:      Cervical: No cervical adenopathy.   Skin:     Findings: No lesion or rash.   Neurological:      Mental Status: He is alert.         Result Review      The following data was reviewed by Scooby Chun MD on 05/30/2023.  Lab Results   Component Value Date    WBC 12.52 (H) 12/08/2022    HGB 11.4 (L) 12/08/2022    HCT 36.4 (L) 12/08/2022    MCV 75.5 (L) 12/08/2022     12/08/2022     Lab Results   Component Value Date    GLUCOSE 116 (H) 12/08/2022    BUN 10 12/08/2022    CREATININE 0.98 12/08/2022     12/08/2022    K 3.8 12/08/2022     12/08/2022    CO2 27.5 12/08/2022    CALCIUM 9.4 12/08/2022    PROTEINTOT 7.7 12/08/2022    ALBUMIN 4.10 12/08/2022    ALT 23 12/08/2022    AST 16 12/08/2022    ALKPHOS 109 12/08/2022    BILITOT 0.5 12/08/2022    EGFR 87.2 12/08/2022    GLOB 3.6 12/08/2022    AGRATIO 1.1 12/08/2022    BCR 10.2 12/08/2022    ANIONGAP 11.5 12/08/2022      Lab Results   Component Value Date    CHOL 109 04/18/2022    CHLPL 109 04/02/2021    TRIG 155 (H) 04/18/2022    HDL 46 04/18/2022    LDL 37 04/18/2022     Lab Results   Component Value Date    TSH 2.320 12/02/2022     Lab Results   Component Value Date    HGBA1C 7.3 02/27/2023     Lab Results   Component Value Date    PSA 0.482 04/18/2022    PSA 0.47 04/02/2021    PSA 0.67 01/27/2021            Assessment and Plan:   Today, we have reviewed his care.  Regarding sleep apnea, I have drafted a letter in support of alternative treatment.  Please find attached in the chart.  Otherwise, we will refill needed medications and update labs as noted.  His blood pressure is mildly elevated, and will be rechecked before he leaves.  We will tentatively plan to see him back in about 6 months for recheck.  Low-dose CT will be ordered.  He declined Shingrix today.       Diagnoses and all orders for this visit:    1. Obstructive sleep apnea syndrome (Primary)  Comments:  As  above.    2. Essential hypertension  -     Comprehensive Metabolic Panel; Future  -     amLODIPine (NORVASC) 10 MG tablet; Take 1 tablet by mouth every night at bedtime.  Dispense: 90 tablet; Refill: 3  -     losartan (COZAAR) 100 MG tablet; Take 1 tablet by mouth Daily.  Dispense: 90 tablet; Refill: 3    3. Mixed hyperlipidemia  -     Lipid Panel; Future  -     Comprehensive Metabolic Panel; Future  -     atorvastatin (LIPITOR) 40 MG tablet; Take 1 tablet by mouth Daily.  Dispense: 90 tablet; Refill: 3    4. Type 2 diabetes mellitus with diabetic polyneuropathy, without long-term current use of insulin  -     MicroAlbumin, Urine, Random - Urine, Clean Catch; Future  -     Hemoglobin A1c; Future  -     Comprehensive Metabolic Panel; Future  -     Dulaglutide (Trulicity) 3 MG/0.5ML solution pen-injector; Inject 0.5 mL under the skin into the appropriate area as directed 1 (One) Time Per Week.  Dispense: 6.5 mL; Refill: 3  -     metFORMIN (GLUCOPHAGE) 1000 MG tablet; Take 1 tablet by mouth 2 (Two) Times a Day With Meals.  Dispense: 180 tablet; Refill: 3    5. Pedal edema  -     furosemide (Lasix) 20 MG tablet; Take 1 tablet by mouth Daily.  Dispense: 90 tablet; Refill: 3    6. Screening for prostate cancer  -     PSA Screen; Future    7. Other long term (current) drug therapy  -     CBC (No Diff); Future    8. Nicotine dependence, cigarettes, uncomplicated  -     CT Chest Low Dose Wo; Future    Other orders  -     omeprazole (priLOSEC) 40 MG capsule; Take 1 capsule by mouth Daily.  Dispense: 90 capsule; Refill: 3    Follow Up   Return in about 6 months (around 11/30/2023) for Recheck.  Patient was given instructions and counseling regarding his condition or for health maintenance advice. Please see specific information pulled into the AVS if appropriate.

## 2023-05-30 NOTE — LETTER
May 30, 2023     Patient: Cristino Johns   YOB: 1960   Date of Visit: 5/30/2023       To Whom It May Concern:    Cristino has obstructive sleep apnea for which he has been on CPAP for some time.  He is not able to tolerate PAP treatment and is interested in moving ahead with an alternative form of care for sleep apnea such as Inspire.  I am in support of this.  If I may be of additional assistance, please let me know.    Sincerely,        Scooby Chun MD

## 2023-05-31 LAB
ALBUMIN SERPL-MCNC: 4.2 G/DL (ref 3.5–5.2)
ALBUMIN/GLOB SERPL: 1.2 G/DL
ALP SERPL-CCNC: 104 U/L (ref 39–117)
ALT SERPL W P-5'-P-CCNC: 21 U/L (ref 1–41)
ANION GAP SERPL CALCULATED.3IONS-SCNC: 11.9 MMOL/L (ref 5–15)
AST SERPL-CCNC: 19 U/L (ref 1–40)
BILIRUB SERPL-MCNC: 0.5 MG/DL (ref 0–1.2)
BUN SERPL-MCNC: 9 MG/DL (ref 8–23)
BUN/CREAT SERPL: 10.6 (ref 7–25)
CALCIUM SPEC-SCNC: 9.4 MG/DL (ref 8.6–10.5)
CHLORIDE SERPL-SCNC: 102 MMOL/L (ref 98–107)
CHOLEST SERPL-MCNC: 108 MG/DL (ref 0–200)
CO2 SERPL-SCNC: 25.1 MMOL/L (ref 22–29)
CREAT SERPL-MCNC: 0.85 MG/DL (ref 0.76–1.27)
EGFRCR SERPLBLD CKD-EPI 2021: 97.6 ML/MIN/1.73
GLOBULIN UR ELPH-MCNC: 3.4 GM/DL
GLUCOSE SERPL-MCNC: 100 MG/DL (ref 65–99)
HBA1C MFR BLD: 7.5 % (ref 4.8–5.6)
HDLC SERPL-MCNC: 54 MG/DL (ref 40–60)
LDLC SERPL CALC-MCNC: 31 MG/DL (ref 0–100)
LDLC/HDLC SERPL: 0.51 {RATIO}
POTASSIUM SERPL-SCNC: 3.6 MMOL/L (ref 3.5–5.2)
PROT SERPL-MCNC: 7.6 G/DL (ref 6–8.5)
PSA SERPL-MCNC: 0.51 NG/ML (ref 0–4)
SODIUM SERPL-SCNC: 139 MMOL/L (ref 136–145)
TRIGL SERPL-MCNC: 132 MG/DL (ref 0–150)
VLDLC SERPL-MCNC: 23 MG/DL (ref 5–40)

## 2023-05-31 RX ORDER — POTASSIUM CHLORIDE 750 MG/1
10 CAPSULE, EXTENDED RELEASE ORAL DAILY
Qty: 90 CAPSULE | Refills: 3 | Status: SHIPPED | OUTPATIENT
Start: 2023-05-31

## 2023-05-31 RX ORDER — FUROSEMIDE 40 MG/1
40 TABLET ORAL DAILY
Qty: 90 TABLET | Refills: 3 | Status: SHIPPED | OUTPATIENT
Start: 2023-05-31

## 2023-06-05 ENCOUNTER — TELEPHONE (OUTPATIENT)
Dept: FAMILY MEDICINE CLINIC | Age: 63
End: 2023-06-05

## 2023-06-05 DIAGNOSIS — E11.42 TYPE 2 DIABETES MELLITUS WITH DIABETIC POLYNEUROPATHY, WITHOUT LONG-TERM CURRENT USE OF INSULIN: Primary | ICD-10-CM

## 2023-06-05 NOTE — TELEPHONE ENCOUNTER
Jenn states pt would be interested in the Jardiance. Would like rx sent to our pharmacy so they can check on cost.

## 2023-06-05 NOTE — TELEPHONE ENCOUNTER
Caller: MIRANDA YUNG    Relationship: Emergency Contact    Best call back number: [phone]     What was the call regarding: MIRANDA STATES SHE WOULD LIKE A CALL FROM DR RUBENS MARSHALL TO DISCUSS SOME MEDICINE.

## 2023-06-14 ENCOUNTER — HOSPITAL ENCOUNTER (OUTPATIENT)
Dept: CT IMAGING | Facility: HOSPITAL | Age: 63
Discharge: HOME | End: 2023-06-14
Admitting: FAMILY MEDICINE
Payer: COMMERCIAL

## 2023-06-14 DIAGNOSIS — F17.210 NICOTINE DEPENDENCE, CIGARETTES, UNCOMPLICATED: ICD-10-CM

## 2023-06-14 PROCEDURE — 71271 CT THORAX LUNG CANCER SCR C-: CPT

## 2023-08-15 ENCOUNTER — OFFICE VISIT (OUTPATIENT)
Dept: FAMILY MEDICINE CLINIC | Age: 63
End: 2023-08-15
Payer: COMMERCIAL

## 2023-08-15 VITALS
TEMPERATURE: 97.9 F | HEIGHT: 72 IN | BODY MASS INDEX: 42.64 KG/M2 | SYSTOLIC BLOOD PRESSURE: 139 MMHG | HEART RATE: 79 BPM | DIASTOLIC BLOOD PRESSURE: 72 MMHG | WEIGHT: 314.8 LBS

## 2023-08-15 DIAGNOSIS — N41.0 ACUTE PROSTATITIS: Primary | ICD-10-CM

## 2023-08-15 LAB
BACTERIA UR QL AUTO: NORMAL /HPF
BILIRUB UR QL STRIP: NEGATIVE
CLARITY UR: CLEAR
COLOR UR: YELLOW
GLUCOSE UR STRIP-MCNC: ABNORMAL MG/DL
HGB UR QL STRIP.AUTO: NEGATIVE
KETONES UR QL STRIP: NEGATIVE
LEUKOCYTE ESTERASE UR QL STRIP.AUTO: NEGATIVE
NITRITE UR QL STRIP: NEGATIVE
PH UR STRIP.AUTO: 5.5 [PH] (ref 5–8)
PROT UR QL STRIP: NEGATIVE
RBC # UR STRIP: NORMAL /HPF
REF LAB TEST METHOD: NORMAL
SP GR UR STRIP: 1.02 (ref 1–1.03)
SQUAMOUS #/AREA URNS HPF: NORMAL /HPF
UROBILINOGEN UR QL STRIP: ABNORMAL
WBC # UR STRIP: NORMAL /HPF

## 2023-08-15 PROCEDURE — 99213 OFFICE O/P EST LOW 20 MIN: CPT | Performed by: FAMILY MEDICINE

## 2023-08-15 PROCEDURE — 87086 URINE CULTURE/COLONY COUNT: CPT | Performed by: FAMILY MEDICINE

## 2023-08-15 PROCEDURE — 81001 URINALYSIS AUTO W/SCOPE: CPT | Performed by: FAMILY MEDICINE

## 2023-08-15 RX ORDER — LEVOFLOXACIN 500 MG/1
500 TABLET, FILM COATED ORAL DAILY
Qty: 14 TABLET | Refills: 0 | Status: SHIPPED | OUTPATIENT
Start: 2023-08-15

## 2023-08-15 NOTE — PROGRESS NOTES
Cristino Johns presents to [facility].    Chief Complaint:  Lower abdominal pressure    Subjective        History of Present Illness:  Cristino is being seen today for evaluation of lower abdominal pain.  This started in the last few days.  He states that he noted it initially at night.  This seems to come and go to some degree.  When this is present though, it is very uncomfortable.  He has not had any fever.  He has had some associated nausea.  He denies any dysuria or blood in the urine.  He was noted to have some diverticula on most recent colonoscopy from 10/29/2021.  He has never had diverticulitis though.  He is not aware of any previous history of prostate infection.  Having a bowel movement seems to make the symptoms worse.    Review of Systems:  Review of Systems   Constitutional:  Negative for chills and fever.   Respiratory:  Negative for cough and shortness of breath.    Cardiovascular:  Negative for chest pain and palpitations.   Gastrointestinal:  Positive for abdominal pain. Negative for nausea and vomiting.      Objective   Medical History:  Past Medical History:    Diabetes    GERD (gastroesophageal reflux disease)    History of COVID-19    fall of 2020    Hyperlipidemia    Hypertension    Polyp, vocal cord    Spinal headache    Urinary frequency     Past Surgical History:    COLONOSCOPY    COLONOSCOPY    Tubular adenoma    ENDOSCOPY    Procedure: ESOPHAGOGASTRODUODENOSCOPY with biopsy;  Surgeon: Vincenzo King MD;  Location: [facility] ENDOSCOPY;  Service: Gastroenterology;  Laterality: N/A;  small hiatal hernia    ESOPHAGUS SURGERY    aorta wrapped around esophagus    HERNIA REPAIR    UPPER GASTROINTESTINAL ENDOSCOPY    VASCULAR SURGERY      Family History   Problem Relation Age of Onset    Hypertension Mother     Cancer Mother     Diabetes Mother     Malig Hyperthermia Neg Hx     Colon cancer Neg Hx      Social History     Tobacco Use    Smoking status: Former      Packs/day: 1.50     Years: 30.00     Pack years: 45.00     Types: Cigarettes     Quit date: 2015     Years since quittin.1    Smokeless tobacco: Never   Substance Use Topics    Alcohol use: Yes     Comment: little, sometimes on the weekends.       Health Maintenance Due   Topic Date Due    ZOSTER VACCINE (1 of 2) Never done    DIABETIC EYE EXAM  Never done        Immunization History   Administered Date(s) Administered    COVID-19 (PFIZER) Purple Cap Monovalent 2021, 2021, 2021    Pneumococcal Conjugate 20-Valent (PCV20) 2023    Pneumococcal Polysaccharide (PPSV23) 2017    Tdap 2020       Allergies   Allergen Reactions    Latex Irritability        Medications:  Current Outpatient Medications on File Prior to Visit   Medication Sig    Accu-Chek Softclix Lancets lancets Check blood sugar once daily    amLODIPine (NORVASC) 10 MG tablet Take 1 tablet by mouth every night at bedtime.    aspirin 81 MG EC tablet Take 1 tablet by mouth Daily.    atorvastatin (LIPITOR) 40 MG tablet Take 1 tablet by mouth Daily.    Blood Glucose Monitoring Suppl (Accu-Chek Candace Plus) w/Device kit Check blood sugar once daily    Dulaglutide (Trulicity) 3 MG/0.5ML solution pen-injector Inject 3mg under the skin into the appropriate area as directed 1 (One) Time Per Week.    empagliflozin (JARDIANCE) 10 MG tablet tablet Take 1 tablet by mouth Daily.    ferrous sulfate 325 (65 FE) MG tablet Take 1 tablet by mouth Daily With Breakfast.    fluticasone (FLONASE) 50 MCG/ACT nasal spray Use 1 spray into each nostril as directed by provider Daily.    furosemide (Lasix) 40 MG tablet Take 1 tablet by mouth Daily.    gabapentin (NEURONTIN) 100 MG capsule Take 1 capsule by mouth 3 (Three) Times a Day.    glucose blood (Accu-Chek Candace Plus) test strip Check blood sugar once daily.    loratadine (Claritin) 10 MG tablet Take 1 tablet by mouth Daily.    losartan (COZAAR) 100 MG tablet Take 1 tablet by mouth Daily.  "   metFORMIN (GLUCOPHAGE) 1000 MG tablet Take 1 tablet by mouth 2 (Two) Times a Day With Meals.    omeprazole (priLOSEC) 40 MG capsule Take 1 capsule by mouth Daily.    potassium chloride (MICRO-K) 10 MEQ CR capsule Take 1 capsule by mouth Daily.    sildenafil (VIAGRA) 100 MG tablet Take 0.5 to 1 tablet approximately 1 hour prior to sex as needed    azithromycin (Zithromax Z-Brennon) 250 MG tablet Take 2 tablets by mouth on day 1, then 1 tablet daily on days 2-5    promethazine-dextromethorphan (PROMETHAZINE-DM) 6.25-15 MG/5ML syrup Take 5mLs by mouth At Night As Needed for Cough.     No current facility-administered medications on file prior to visit.       Vital Signs:   /72 (BP Location: Left arm, Patient Position: Sitting)   Pulse 79   Temp 97.9 øF (36.6 øC) (Oral)   Ht 182.9 cm (72.01\")   Wt (!) 143 kg (314 lb 12.8 oz)   BMI 42.69 kg/mý       Physical Exam:  Physical Exam  Vitals reviewed.   Constitutional:       General: He is not in acute distress.     Appearance: He is not ill-appearing.   Eyes:      Pupils: Pupils are equal, round, and reactive to light.   Neck:      Comments: No thyromegaly  Cardiovascular:      Rate and Rhythm: Normal rate and regular rhythm.   Pulmonary:      Effort: Pulmonary effort is normal.      Breath sounds: Normal breath sounds.   Abdominal:      General: There is no distension.      Palpations: Abdomen is soft.      Tenderness: There is no abdominal tenderness.   Genitourinary:     Prostate: Tender. Not enlarged and no nodules present.      Rectum: Tenderness present. No mass.   Musculoskeletal:      Cervical back: Neck supple.   Lymphadenopathy:      Cervical: No cervical adenopathy.   Skin:     Findings: No lesion or rash.   Neurological:      Mental Status: He is alert.       Result Review      The following data was reviewed by Scooby Chun MD on 08/15/2023.  Lab Results   Component Value Date    WBC 10.32 05/30/2023    HGB 11.8 (L) 05/30/2023    HCT 37.2 (L) " 05/30/2023    MCV 75.9 (L) 05/30/2023     05/30/2023     Lab Results   Component Value Date    GLUCOSE 118 (H) 07/19/2023    BUN 8 07/19/2023    CREATININE 1.00 07/19/2023     07/19/2023    K 3.9 07/19/2023     07/19/2023    CO2 27.6 07/19/2023    CALCIUM 9.3 07/19/2023    PROTEINTOT 7.6 05/30/2023    ALBUMIN 4.2 05/30/2023    ALT 21 05/30/2023    AST 19 05/30/2023    ALKPHOS 104 05/30/2023    BILITOT 0.5 05/30/2023    EGFR 84.6 07/19/2023    GLOB 3.4 05/30/2023    AGRATIO 1.2 05/30/2023    BCR 8.0 07/19/2023    ANIONGAP 10.4 07/19/2023      Lab Results   Component Value Date    CHOL 108 05/30/2023    CHLPL 109 04/02/2021    TRIG 132 05/30/2023    HDL 54 05/30/2023    LDL 31 05/30/2023     Lab Results   Component Value Date    TSH 2.320 12/02/2022     Lab Results   Component Value Date    HGBA1C 7.50 (H) 05/30/2023     Lab Results   Component Value Date    PSA 0.510 05/30/2023    PSA 0.482 04/18/2022    PSA 0.47 04/02/2021            Assessment and Plan:   Today, we have reviewed his care.  His prostate is somewhat tender on rectal exam.  There is no definite finding of rectal mass.  The urine does not suggest a urinary tract or bladder infection.  We will move ahead with treatment for presumed prostatitis as noted below.  I would expect gradual improvement with the symptoms.  If he develops fever or worsening symptoms, I have asked him to reach back out.  Otherwise, we will contact him when the urine culture results in a couple of days.       Diagnoses and all orders for this visit:    1. Acute prostatitis (Primary)  -     Urinalysis With Microscopic - Urine, Clean Catch  -     levoFLOXacin (LEVAQUIN) 500 MG tablet; Take 1 tablet by mouth Daily.  Dispense: 14 tablet; Refill: 0  -     Urine Culture - Urine, Urine, Clean Catch    Follow Up   Return if symptoms worsen or fail to improve.  Patient was given instructions and counseling regarding his condition or for health maintenance advice. Please  see specific information pulled into the AVS if appropriate.

## 2023-08-16 LAB — BACTERIA SPEC AEROBE CULT: NO GROWTH

## 2023-08-21 ENCOUNTER — TELEPHONE (OUTPATIENT)
Dept: FAMILY MEDICINE CLINIC | Age: 63
End: 2023-08-21
Payer: COMMERCIAL

## 2023-08-21 NOTE — TELEPHONE ENCOUNTER
----- Message from Karine Bee LPN sent at 8/17/2023  4:57 PM EDT -----  Check on pt-is pain better

## 2023-09-01 ENCOUNTER — TELEPHONE (OUTPATIENT)
Dept: FAMILY MEDICINE CLINIC | Age: 63
End: 2023-09-01
Payer: COMMERCIAL

## 2023-09-01 NOTE — TELEPHONE ENCOUNTER
----- Message from Karine Bee LPN sent at 5/31/2023 10:15 AM EDT -----  TICKLE fingerstick A1c in 90 days.

## 2023-10-05 ENCOUNTER — TELEPHONE (OUTPATIENT)
Dept: FAMILY MEDICINE CLINIC | Age: 63
End: 2023-10-05
Payer: COMMERCIAL

## 2023-10-05 NOTE — TELEPHONE ENCOUNTER
Pt is requesting an increase in days due to pain and swelling in feet and legs, pt states it is more painful more often now.

## 2023-10-05 NOTE — TELEPHONE ENCOUNTER
Please clarify the need for days off.  Most of the time, days off for these issues would only be for physician appointments or other tests/evaluations.  Thanks.

## 2023-10-05 NOTE — TELEPHONE ENCOUNTER
Pt came to drop off his FMLA paperwork. The $20 fee was collected. The paperwork was attached to this encounter and placed in Lauren's mailbox. He is wanting to request more time for this FMLA. He is getting 2-3 days right now and is wanting to see if it can be raised to 4-5 days.

## 2023-11-28 ENCOUNTER — OFFICE VISIT (OUTPATIENT)
Dept: FAMILY MEDICINE CLINIC | Age: 63
End: 2023-11-28
Payer: COMMERCIAL

## 2023-11-28 ENCOUNTER — LAB (OUTPATIENT)
Dept: LAB | Facility: HOSPITAL | Age: 63
End: 2023-11-28
Payer: COMMERCIAL

## 2023-11-28 VITALS
BODY MASS INDEX: 42.61 KG/M2 | HEIGHT: 72 IN | HEART RATE: 84 BPM | TEMPERATURE: 98.3 F | SYSTOLIC BLOOD PRESSURE: 116 MMHG | OXYGEN SATURATION: 95 % | WEIGHT: 314.6 LBS | DIASTOLIC BLOOD PRESSURE: 87 MMHG

## 2023-11-28 DIAGNOSIS — D64.9 ANEMIA, UNSPECIFIED TYPE: ICD-10-CM

## 2023-11-28 DIAGNOSIS — I10 ESSENTIAL HYPERTENSION: ICD-10-CM

## 2023-11-28 DIAGNOSIS — E78.2 MIXED HYPERLIPIDEMIA: ICD-10-CM

## 2023-11-28 DIAGNOSIS — Z00.00 PHYSICAL EXAM: Primary | ICD-10-CM

## 2023-11-28 DIAGNOSIS — E11.42 TYPE 2 DIABETES MELLITUS WITH DIABETIC POLYNEUROPATHY, WITHOUT LONG-TERM CURRENT USE OF INSULIN: ICD-10-CM

## 2023-11-28 DIAGNOSIS — R60.0 PEDAL EDEMA: ICD-10-CM

## 2023-11-28 DIAGNOSIS — Z00.00 PHYSICAL EXAM: ICD-10-CM

## 2023-11-28 LAB
ALBUMIN SERPL-MCNC: 4.1 G/DL (ref 3.5–5.2)
ALBUMIN/GLOB SERPL: 1.2 G/DL
ALP SERPL-CCNC: 107 U/L (ref 39–117)
ALT SERPL W P-5'-P-CCNC: 17 U/L (ref 1–41)
ANION GAP SERPL CALCULATED.3IONS-SCNC: 10.1 MMOL/L (ref 5–15)
AST SERPL-CCNC: 18 U/L (ref 1–40)
BILIRUB SERPL-MCNC: 0.4 MG/DL (ref 0–1.2)
BUN SERPL-MCNC: 14 MG/DL (ref 8–23)
BUN/CREAT SERPL: 13 (ref 7–25)
CALCIUM SPEC-SCNC: 9.4 MG/DL (ref 8.6–10.5)
CHLORIDE SERPL-SCNC: 102 MMOL/L (ref 98–107)
CO2 SERPL-SCNC: 28.9 MMOL/L (ref 22–29)
CREAT SERPL-MCNC: 1.08 MG/DL (ref 0.76–1.27)
DEPRECATED RDW RBC AUTO: 42.1 FL (ref 37–54)
EGFRCR SERPLBLD CKD-EPI 2021: 77.1 ML/MIN/1.73
ERYTHROCYTE [DISTWIDTH] IN BLOOD BY AUTOMATED COUNT: 15.1 % (ref 12.3–15.4)
FOLATE SERPL-MCNC: 13.4 NG/ML (ref 4.78–24.2)
GLOBULIN UR ELPH-MCNC: 3.3 GM/DL
GLUCOSE SERPL-MCNC: 113 MG/DL (ref 65–99)
HBA1C MFR BLD: 7.6 % (ref 4.8–5.6)
HCT VFR BLD AUTO: 40.4 % (ref 37.5–51)
HGB BLD-MCNC: 12.6 G/DL (ref 13–17.7)
MCH RBC QN AUTO: 23.6 PG (ref 26.6–33)
MCHC RBC AUTO-ENTMCNC: 31.2 G/DL (ref 31.5–35.7)
MCV RBC AUTO: 75.7 FL (ref 79–97)
PLATELET # BLD AUTO: 306 10*3/MM3 (ref 140–450)
PMV BLD AUTO: 9.2 FL (ref 6–12)
POTASSIUM SERPL-SCNC: 4.4 MMOL/L (ref 3.5–5.2)
PROT SERPL-MCNC: 7.4 G/DL (ref 6–8.5)
RBC # BLD AUTO: 5.34 10*6/MM3 (ref 4.14–5.8)
SODIUM SERPL-SCNC: 141 MMOL/L (ref 136–145)
TSH SERPL DL<=0.05 MIU/L-ACNC: 1.68 UIU/ML (ref 0.27–4.2)
VIT B12 BLD-MCNC: 1069 PG/ML (ref 211–946)
WBC NRBC COR # BLD AUTO: 9.14 10*3/MM3 (ref 3.4–10.8)

## 2023-11-28 PROCEDURE — 80053 COMPREHEN METABOLIC PANEL: CPT

## 2023-11-28 PROCEDURE — 99396 PREV VISIT EST AGE 40-64: CPT | Performed by: FAMILY MEDICINE

## 2023-11-28 PROCEDURE — 83036 HEMOGLOBIN GLYCOSYLATED A1C: CPT

## 2023-11-28 PROCEDURE — 85027 COMPLETE CBC AUTOMATED: CPT

## 2023-11-28 PROCEDURE — 82746 ASSAY OF FOLIC ACID SERUM: CPT

## 2023-11-28 PROCEDURE — 36415 COLL VENOUS BLD VENIPUNCTURE: CPT

## 2023-11-28 PROCEDURE — 84443 ASSAY THYROID STIM HORMONE: CPT

## 2023-11-28 PROCEDURE — 82607 VITAMIN B-12: CPT

## 2023-11-28 RX ORDER — DULAGLUTIDE 3 MG/.5ML
3 INJECTION, SOLUTION SUBCUTANEOUS WEEKLY
Qty: 6 ML | Refills: 3 | Status: SHIPPED | OUTPATIENT
Start: 2023-11-28

## 2023-11-28 RX ORDER — OMEPRAZOLE 40 MG/1
40 CAPSULE, DELAYED RELEASE ORAL DAILY
Qty: 90 CAPSULE | Refills: 3 | Status: SHIPPED | OUTPATIENT
Start: 2023-11-28

## 2023-11-28 RX ORDER — LOSARTAN POTASSIUM 100 MG/1
100 TABLET ORAL DAILY
Qty: 90 TABLET | Refills: 3 | Status: SHIPPED | OUTPATIENT
Start: 2023-11-28

## 2023-11-28 RX ORDER — ATORVASTATIN CALCIUM 40 MG/1
40 TABLET, FILM COATED ORAL DAILY
Qty: 90 TABLET | Refills: 3 | Status: SHIPPED | OUTPATIENT
Start: 2023-11-28

## 2023-11-28 RX ORDER — AMLODIPINE BESYLATE 10 MG/1
10 TABLET ORAL
Qty: 90 TABLET | Refills: 3 | Status: SHIPPED | OUTPATIENT
Start: 2023-11-28

## 2023-11-28 RX ORDER — POTASSIUM CHLORIDE 750 MG/1
10 CAPSULE, EXTENDED RELEASE ORAL DAILY
Qty: 90 CAPSULE | Refills: 3 | Status: SHIPPED | OUTPATIENT
Start: 2023-11-28

## 2023-11-28 RX ORDER — FUROSEMIDE 40 MG/1
40 TABLET ORAL DAILY
Qty: 90 TABLET | Refills: 3 | Status: SHIPPED | OUTPATIENT
Start: 2023-11-28

## 2023-11-28 NOTE — PROGRESS NOTES
Cristino Johns presents to [facility].    Chief Complaint:  Annual physical, diabetes, blood pressure, cholesterol    Subjective   History of Present Illness:  Cristino is being seen today for annual physical and follow-up.  He is 62 years old and works for [employer] locally where he has been for about 5.5 years.  He does not smoke after stopping about 8.5 years ago.  He is up-to-date on low-dose CT which was done in June of this year.  He has some beer and tequila on his days off.  He denies concerns about how much he drinks.    Cristino also has type 2 diabetes for which he currently takes Trulicity, Jardiance, and metformin.  He says that his sugars have been running in the 120 range for the most part.  He is not aware of obvious side effects related to the medications.  He denies any significant hypoglycemia.    Cristino also has hypertension and elevated cholesterol for which he remains on treatments as noted.    Review of Systems:  Review of Systems   Constitutional:  Negative for chills and fever.   Respiratory:  Positive for cough. Negative for shortness of breath.    Cardiovascular:  Negative for chest pain and palpitations.   Gastrointestinal:  Negative for abdominal pain, nausea and vomiting.      Objective   Medical History:  Past Medical History:    Diabetes    GERD (gastroesophageal reflux disease)    History of COVID-19    fall of 2020    Hyperlipidemia    Hypertension    Polyp, vocal cord    Spinal headache    Urinary frequency     Past Surgical History:    COLONOSCOPY    COLONOSCOPY    Tubular adenoma    ENDOSCOPY    Procedure: ESOPHAGOGASTRODUODENOSCOPY with biopsy;  Surgeon: Vincenzo King MD;  Location: [facility] ENDOSCOPY;  Service: Gastroenterology;  Laterality: N/A;  small hiatal hernia    ESOPHAGUS SURGERY    aorta wrapped around esophagus    HERNIA REPAIR    UPPER GASTROINTESTINAL ENDOSCOPY    VASCULAR SURGERY      Family History   Problem Relation Age of Onset     Hypertension Mother     Cancer Mother     Diabetes Mother     Malig Hyperthermia Neg Hx     Colon cancer Neg Hx      Social History     Tobacco Use    Smoking status: Former     Packs/day: 1.50     Years: 30.00     Additional pack years: 0.00     Total pack years: 45.00     Types: Cigarettes     Quit date: 2015     Years since quittin.4     Passive exposure: Never    Smokeless tobacco: Never   Substance Use Topics    Alcohol use: Yes     Comment: little, sometimes on the weekends.       Health Maintenance Due   Topic Date Due    ZOSTER VACCINE (1 of 2) Never done    DIABETIC EYE EXAM  Never done        Immunization History   Administered Date(s) Administered    COVID-19 (PFIZER) Purple Cap Monovalent 2021, 2021, 2021    Pneumococcal Conjugate 20-Valent (PCV20) 2023    Pneumococcal Polysaccharide (PPSV23) 2017    Tdap 2020       Allergies   Allergen Reactions    Latex Irritability        Medications:  Current Outpatient Medications on File Prior to Visit   Medication Sig    aspirin 81 MG EC tablet Take 1 tablet by mouth Daily.    Blood Glucose Monitoring Suppl (Accu-Chek Candace Plus) w/Device kit Check blood sugar once daily    empagliflozin (JARDIANCE) 10 MG tablet tablet Take 1 tablet by mouth Daily.    ferrous sulfate 325 (65 FE) MG tablet Take 1 tablet by mouth Daily With Breakfast.    fluticasone (FLONASE) 50 MCG/ACT nasal spray Use 1 spray into each nostril as directed by provider Daily.    gabapentin (NEURONTIN) 100 MG capsule Take 1 capsule by mouth Daily.    glucose blood (Accu-Chek Candace Plus) test strip Check blood sugar once daily.    loratadine (Claritin) 10 MG tablet Take 1 tablet by mouth Daily.    promethazine-dextromethorphan (PROMETHAZINE-DM) 6.25-15 MG/5ML syrup Take 5mLs by mouth At Night As Needed for Cough.    sildenafil (VIAGRA) 100 MG tablet Take 0.5 to 1 tablet approximately 1 hour prior to sex as needed    [DISCONTINUED] amLODIPine (NORVASC) 10  "MG tablet Take 1 tablet by mouth every night at bedtime.    [DISCONTINUED] atorvastatin (LIPITOR) 40 MG tablet Take 1 tablet by mouth Daily.    [DISCONTINUED] Dulaglutide (Trulicity) 3 MG/0.5ML solution pen-injector Inject 3mg under the skin into the appropriate area as directed 1 (One) Time Per Week.    [DISCONTINUED] furosemide (Lasix) 40 MG tablet Take 1 tablet by mouth Daily.    [DISCONTINUED] losartan (COZAAR) 100 MG tablet Take 1 tablet by mouth Daily.    [DISCONTINUED] metFORMIN (GLUCOPHAGE) 1000 MG tablet Take 1 tablet by mouth 2 (Two) Times a Day With Meals.    [DISCONTINUED] omeprazole (priLOSEC) 40 MG capsule Take 1 capsule by mouth Daily.    [DISCONTINUED] potassium chloride (MICRO-K) 10 MEQ CR capsule Take 1 capsule by mouth Daily.    [DISCONTINUED] azithromycin (Zithromax Z-Brennon) 250 MG tablet Take 2 tablets by mouth on day 1, then 1 tablet daily on days 2-5 (Patient not taking: Reported on 11/28/2023)    [DISCONTINUED] levoFLOXacin (LEVAQUIN) 500 MG tablet Take 1 tablet by mouth Daily. (Patient not taking: Reported on 11/28/2023)     No current facility-administered medications on file prior to visit.       Vital Signs:   /87 (BP Location: Right arm, Patient Position: Sitting, Cuff Size: Large Adult)   Pulse 84   Temp 98.3 °F (36.8 °C) (Oral)   Ht 182.9 cm (72.01\")   Wt (!) 143 kg (314 lb 9.6 oz)   SpO2 95%   BMI 42.66 kg/m²       Physical Exam:  Physical Exam  Vitals and nursing note reviewed.   Constitutional:       General: He is not in acute distress.     Appearance: He is obese. He is not ill-appearing.   HENT:      Right Ear: Tympanic membrane and ear canal normal.      Left Ear: Tympanic membrane and ear canal normal.      Mouth/Throat:      Mouth: Mucous membranes are moist.      Comments: Pharynx appears normal  Eyes:      Extraocular Movements: Extraocular movements intact.      Pupils: Pupils are equal, round, and reactive to light.   Neck:      Thyroid: No thyromegaly.      " Comments: No thyromegaly  Cardiovascular:      Rate and Rhythm: Normal rate and regular rhythm.      Heart sounds: No murmur heard.  Pulmonary:      Effort: Pulmonary effort is normal.      Breath sounds: Normal breath sounds.   Abdominal:      General: There is no distension.      Palpations: Abdomen is soft. There is no mass.      Tenderness: There is no abdominal tenderness.   Musculoskeletal:      Cervical back: Normal range of motion and neck supple.   Lymphadenopathy:      Cervical: No cervical adenopathy.   Skin:     Findings: No lesion or rash.   Neurological:      General: No focal deficit present.      Mental Status: He is alert and oriented to person, place, and time.      Cranial Nerves: No cranial nerve deficit.   Psychiatric:         Mood and Affect: Mood normal.       Result Review   The following data was reviewed by Scooby Chun MD on 11/28/2023.  Lab Results   Component Value Date    WBC 10.32 05/30/2023    HGB 11.8 (L) 05/30/2023    HCT 37.2 (L) 05/30/2023    MCV 75.9 (L) 05/30/2023     05/30/2023     Lab Results   Component Value Date    GLUCOSE 118 (H) 07/19/2023    BUN 8 07/19/2023    CREATININE 1.00 07/19/2023     07/19/2023    K 3.9 07/19/2023     07/19/2023    CO2 27.6 07/19/2023    CALCIUM 9.3 07/19/2023    PROTEINTOT 7.6 05/30/2023    ALBUMIN 4.2 05/30/2023    ALT 21 05/30/2023    AST 19 05/30/2023    ALKPHOS 104 05/30/2023    BILITOT 0.5 05/30/2023    EGFR 84.6 07/19/2023    GLOB 3.4 05/30/2023    AGRATIO 1.2 05/30/2023    BCR 8.0 07/19/2023    ANIONGAP 10.4 07/19/2023      Lab Results   Component Value Date    CHOL 108 05/30/2023    CHLPL 109 04/02/2021    TRIG 132 05/30/2023    HDL 54 05/30/2023    LDL 31 05/30/2023     Lab Results   Component Value Date    TSH 2.320 12/02/2022     Lab Results   Component Value Date    HGBA1C 7.50 (H) 05/30/2023     Lab Results   Component Value Date    PSA 0.510 05/30/2023    PSA 0.482 04/18/2022    PSA 0.47 04/02/2021           Assessment and Plan:   Today, we have reviewed his care.  Cristino seems to be doing well overall.  His blood pressure is well-controlled.  It sounds like his sugars are doing better also.  For now, we will refill his current medications.  I will not send a refill on Jardiance yet.  We may bump the dose up on it.  Otherwise, we will see what his labs show and tentatively plan to see him back in about 6 months.  He has had some congestion over the last few days.  If he does not feel better as the week progresses, I did ask him to reach back out.  I would prefer not to use an antibiotic unless needed though.    Diagnoses and all orders for this visit:    1. Physical exam (Primary)  -     Comprehensive Metabolic Panel; Future  -     Hemoglobin A1c; Future  -     TSH; Future  -     CBC (No Diff); Future  -     Vitamin B12 & Folate; Future    2. Type 2 diabetes mellitus with diabetic polyneuropathy, without long-term current use of insulin  -     Hemoglobin A1c; Future  -     Dulaglutide (Trulicity) 3 MG/0.5ML solution pen-injector; Inject 3mg under the skin into the appropriate area as directed 1 (One) Time Per Week.  Dispense: 6 mL; Refill: 3  -     metFORMIN (GLUCOPHAGE) 1000 MG tablet; Take 1 tablet by mouth 2 (Two) Times a Day With Meals.  Dispense: 180 tablet; Refill: 3    3. Essential hypertension  -     Comprehensive Metabolic Panel; Future  -     amLODIPine (NORVASC) 10 MG tablet; Take 1 tablet by mouth every night at bedtime.  Dispense: 90 tablet; Refill: 3  -     losartan (COZAAR) 100 MG tablet; Take 1 tablet by mouth Daily.  Dispense: 90 tablet; Refill: 3  -     potassium chloride (MICRO-K) 10 MEQ CR capsule; Take 1 capsule by mouth Daily.  Dispense: 90 capsule; Refill: 3    4. Mixed hyperlipidemia  -     Comprehensive Metabolic Panel; Future  -     TSH; Future  -     atorvastatin (LIPITOR) 40 MG tablet; Take 1 tablet by mouth Daily.  Dispense: 90 tablet; Refill: 3    5. Anemia, unspecified type  -     CBC  (No Diff); Future  -     Vitamin B12 & Folate; Future    6. Pedal edema  -     furosemide (Lasix) 40 MG tablet; Take 1 tablet by mouth Daily.  Dispense: 90 tablet; Refill: 3    Other orders  -     omeprazole (priLOSEC) 40 MG capsule; Take 1 capsule by mouth Daily.  Dispense: 90 capsule; Refill: 3    Follow Up  Return in about 6 months (around 5/28/2024) for Recheck.  Patient was given instructions and counseling regarding his condition or for health maintenance advice. Please see specific information pulled into the AVS if appropriate.

## 2024-02-05 ENCOUNTER — OFFICE VISIT (OUTPATIENT)
Dept: FAMILY MEDICINE CLINIC | Age: 64
End: 2024-02-05
Payer: COMMERCIAL

## 2024-02-05 VITALS
TEMPERATURE: 98.8 F | BODY MASS INDEX: 42.34 KG/M2 | WEIGHT: 312.6 LBS | SYSTOLIC BLOOD PRESSURE: 143 MMHG | HEIGHT: 72 IN | HEART RATE: 90 BPM | OXYGEN SATURATION: 98 % | DIASTOLIC BLOOD PRESSURE: 83 MMHG

## 2024-02-05 DIAGNOSIS — J01.00 ACUTE NON-RECURRENT MAXILLARY SINUSITIS: Primary | ICD-10-CM

## 2024-02-05 DIAGNOSIS — R05.1 ACUTE COUGH: ICD-10-CM

## 2024-02-05 DIAGNOSIS — R09.81 NASAL CONGESTION: ICD-10-CM

## 2024-02-05 LAB
EXPIRATION DATE: NORMAL
FLUAV AG UPPER RESP QL IA.RAPID: NOT DETECTED
FLUBV AG UPPER RESP QL IA.RAPID: NOT DETECTED
INTERNAL CONTROL: NORMAL
Lab: NORMAL
SARS-COV-2 AG UPPER RESP QL IA.RAPID: NOT DETECTED

## 2024-02-05 PROCEDURE — 87428 SARSCOV & INF VIR A&B AG IA: CPT | Performed by: NURSE PRACTITIONER

## 2024-02-05 PROCEDURE — 99213 OFFICE O/P EST LOW 20 MIN: CPT | Performed by: NURSE PRACTITIONER

## 2024-02-05 RX ORDER — AZITHROMYCIN 250 MG/1
TABLET, FILM COATED ORAL
Qty: 6 TABLET | Refills: 0 | Status: SHIPPED | OUTPATIENT
Start: 2024-02-05

## 2024-02-05 RX ORDER — DEXTROMETHORPHAN HYDROBROMIDE AND PROMETHAZINE HYDROCHLORIDE 15; 6.25 MG/5ML; MG/5ML
5 SYRUP ORAL NIGHTLY PRN
Qty: 120 ML | Refills: 0 | Status: SHIPPED | OUTPATIENT
Start: 2024-02-05

## 2024-02-05 NOTE — PROGRESS NOTES
"Chief Complaint  Nasal Congestion (Sx started a couple of weeks ago ), Cough, URI, and Ear Fullness    Subjective        Cristino Johns presents to [facility]  Cough  This is a new problem. The current episode started 1 to 4 weeks ago. The cough is Productive of sputum. Associated symptoms include ear congestion, nasal congestion, postnasal drip, a sore throat and wheezing. Pertinent negatives include no chest pain, chills, fever or shortness of breath. Treatments tried: obed seltzer plus, cough syrup and tylenol, mucinex. The treatment provided mild relief.   Ear Fullness   There is pain in both ears. This is a new problem. The current episode started 1 to 4 weeks ago. Associated symptoms include coughing and a sore throat.       Objective   Vital Signs:  /83 (BP Location: Left arm, Patient Position: Sitting, Cuff Size: Large Adult)   Pulse 90   Temp 98.8 °F (37.1 °C) (Temporal)   Ht 182.9 cm (72.01\")   Wt (!) 142 kg (312 lb 9.6 oz)   SpO2 98% Comment: room air  BMI 42.38 kg/m²   Estimated body mass index is 42.38 kg/m² as calculated from the following:    Height as of this encounter: 182.9 cm (72.01\").    Weight as of this encounter: 142 kg (312 lb 9.6 oz).               Physical Exam  HENT:      Head: Normocephalic.      Right Ear: A middle ear effusion is present. Tympanic membrane is erythematous.      Left Ear: A middle ear effusion is present. Tympanic membrane is erythematous.      Nose: Congestion present.      Right Sinus: Maxillary sinus tenderness present.      Left Sinus: Maxillary sinus tenderness present.      Mouth/Throat:      Pharynx: Posterior oropharyngeal erythema present.   Cardiovascular:      Rate and Rhythm: Normal rate and regular rhythm.   Pulmonary:      Effort: Pulmonary effort is normal. No respiratory distress.      Breath sounds: Normal breath sounds. No stridor. No wheezing, rhonchi or rales.   Skin:     General: Skin is warm and dry. "   Neurological:      Mental Status: He is alert and oriented to person, place, and time.   Psychiatric:         Mood and Affect: Mood normal.        Result Review :            Results for orders placed or performed in visit on 02/05/24   POCT SARS-CoV-2 Antigen JESUS + Flu    Specimen: Swab   Result Value Ref Range    SARS Antigen Not Detected Not Detected, Presumptive Negative    Influenza A Antigen JESUS Not Detected Not Detected    Influenza B Antigen JESUS Not Detected Not Detected    Internal Control Passed Passed    Lot Number [id number]     Expiration Date 09/14/24               Assessment and Plan     Diagnoses and all orders for this visit:    1. Acute non-recurrent maxillary sinusitis (Primary)  -     azithromycin (Zithromax Z-Brennon) 250 MG tablet; Take 2 tablets by mouth on day 1, then 1 tablet daily on days 2-5  Dispense: 6 tablet; Refill: 0    2. Nasal congestion  -     POCT SARS-CoV-2 Antigen JESUS + Flu    3. Acute cough  -     promethazine-dextromethorphan (PROMETHAZINE-DM) 6.25-15 MG/5ML syrup; Take 5mLs by mouth At Night As Needed for Cough.  Dispense: 120 mL; Refill: 0    Testing negative. Treating based on duration of symptoms, assessment findings, and color of drainage. Force fluids, coricidin HBP as needed for symptoms. F/U if no improvement.          Follow Up     Return if symptoms worsen or fail to improve.  Patient was given instructions and counseling regarding his condition or for health maintenance advice. Please see specific information pulled into the AVS if appropriate.

## 2024-04-09 ENCOUNTER — TELEPHONE (OUTPATIENT)
Dept: FAMILY MEDICINE CLINIC | Age: 64
End: 2024-04-09
Payer: COMMERCIAL

## 2024-04-15 ENCOUNTER — OFFICE VISIT (OUTPATIENT)
Dept: FAMILY MEDICINE CLINIC | Age: 64
End: 2024-04-15
Payer: COMMERCIAL

## 2024-04-15 VITALS
HEART RATE: 82 BPM | BODY MASS INDEX: 42.15 KG/M2 | OXYGEN SATURATION: 96 % | DIASTOLIC BLOOD PRESSURE: 83 MMHG | HEIGHT: 72 IN | TEMPERATURE: 98.1 F | SYSTOLIC BLOOD PRESSURE: 137 MMHG | WEIGHT: 311.2 LBS

## 2024-04-15 DIAGNOSIS — J20.9 BRONCHITIS WITH BRONCHOSPASM: Primary | ICD-10-CM

## 2024-04-15 PROCEDURE — 87428 SARSCOV & INF VIR A&B AG IA: CPT | Performed by: FAMILY MEDICINE

## 2024-04-15 PROCEDURE — 99213 OFFICE O/P EST LOW 20 MIN: CPT | Performed by: FAMILY MEDICINE

## 2024-04-15 RX ORDER — AMOXICILLIN AND CLAVULANATE POTASSIUM 875; 125 MG/1; MG/1
1 TABLET, FILM COATED ORAL 2 TIMES DAILY
Qty: 20 TABLET | Refills: 0 | Status: SHIPPED | OUTPATIENT
Start: 2024-04-15

## 2024-04-15 RX ORDER — ALBUTEROL SULFATE 90 UG/1
2 AEROSOL, METERED RESPIRATORY (INHALATION) EVERY 6 HOURS PRN
Qty: 18 G | Refills: 1 | Status: SHIPPED | OUTPATIENT
Start: 2024-04-15

## 2024-04-15 RX ORDER — PREDNISONE 20 MG/1
40 TABLET ORAL DAILY
Qty: 10 TABLET | Refills: 0 | Status: SHIPPED | OUTPATIENT
Start: 2024-04-15

## 2024-04-15 NOTE — PROGRESS NOTES
Cristino Johns presents to [facility].    Chief Complaint:  Sinus congestion    Subjective   History of Present Illness:  Cristino is being seen today for follow-up on congestion and drainage.  He has been having this for the last 2 to 3 days.  He denies any fever or chills.  He is having quite a bit of mucus and is coughing.  The cough is productive of yellowish mucus.  He says that his ears also feel full to some degree.    Review of Systems:  Review of Systems   Constitutional:  Negative for chills and fever.   HENT:  Positive for congestion and postnasal drip. Negative for sinus pressure and sore throat.    Respiratory:  Positive for cough, chest tightness and shortness of breath (mild).    Cardiovascular:  Negative for chest pain and palpitations.   Gastrointestinal:  Negative for abdominal pain, nausea and vomiting.        Objective   Medical History:  Past Medical History:    Diabetes    GERD (gastroesophageal reflux disease)    History of COVID-    Hyperlipidemia    Hypertension    Polyp, vocal cord    Spinal headache    Urinary frequency     Past Surgical History:    COLONOSCOPY    COLONOSCOPY    Tubular adenoma    ENDOSCOPY    Procedure: ESOPHAGOGASTRODUODENOSCOPY with biopsy;  Surgeon: Vincenzo King MD;  Location: [facility] ENDOSCOPY;  Service: Gastroenterology;  Laterality: N/A;  small hiatal hernia    ESOPHAGUS SURGERY    aorta wrapped around esophagus    HERNIA REPAIR    UPPER GASTROINTESTINAL ENDOSCOPY    VASCULAR SURGERY      Family History   Problem Relation Age of Onset    Hypertension Mother     Cancer Mother     Diabetes Mother     Malig Hyperthermia Neg Hx     Colon cancer Neg Hx      Social History     Tobacco Use    Smoking status: Former     Current packs/day: 0.00     Average packs/day: 1.5 packs/day for 30.0 years (45.0 ttl pk-yrs)     Types: Cigarettes     Start date: 1985     Quit date: 2015     Years since quittin.7      Passive exposure: Never    Smokeless tobacco: Never   Substance Use Topics    Alcohol use: Yes     Comment: little, sometimes on the weekends.       Health Maintenance Due   Topic Date Due    DIABETIC EYE EXAM  Never done    ZOSTER VACCINE (1 of 2) Never done    RSV Vaccine - Adults (1 - 1-dose 60+ series) Never done    HEMOGLOBIN A1C  05/28/2024    LUNG CANCER SCREENING  06/14/2024        Immunization History   Administered Date(s) Administered    COVID-19 (PFIZER) Purple Cap Monovalent 04/01/2021, 04/22/2021, 12/16/2021    Pneumococcal Conjugate 20-Valent (PCV20) 01/24/2023    Pneumococcal Polysaccharide (PPSV23) 03/16/2017    Tdap 05/07/2020       Allergies   Allergen Reactions    Latex Irritability        Medications:  Current Outpatient Medications on File Prior to Visit   Medication Sig    amLODIPine (NORVASC) 10 MG tablet Take 1 tablet by mouth every night at bedtime.    aspirin 81 MG EC tablet Take 1 tablet by mouth Daily.    atorvastatin (LIPITOR) 40 MG tablet Take 1 tablet by mouth Daily.    Blood Glucose Monitoring Suppl (Accu-Chek Candace Plus) w/Device kit Check blood sugar once daily    Dulaglutide (Trulicity) 3 MG/0.5ML solution pen-injector Inject 3mg under the skin into the appropriate area as directed 1 (One) Time Per Week.    empagliflozin (JARDIANCE) 25 MG tablet tablet Take 1 tablet by mouth Daily.    ferrous sulfate 325 (65 FE) MG tablet Take 1 tablet by mouth Daily With Breakfast.    fluticasone (FLONASE) 50 MCG/ACT nasal spray Use 1 spray into each nostril as directed by provider Daily.    furosemide (Lasix) 40 MG tablet Take 1 tablet by mouth Daily.    gabapentin (NEURONTIN) 100 MG capsule Take 1 capsule by mouth Daily.    glucose blood (Accu-Chek Candace Plus) test strip Check blood sugar once daily.    loratadine (Claritin) 10 MG tablet Take 1 tablet by mouth Daily.    losartan (COZAAR) 100 MG tablet Take 1 tablet by mouth Daily.    metFORMIN (GLUCOPHAGE) 1000 MG tablet Take 1 tablet by mouth  "2 (Two) Times a Day With Meals.    omeprazole (priLOSEC) 40 MG capsule Take 1 capsule by mouth Daily.    potassium chloride (MICRO-K) 10 MEQ CR capsule Take 1 capsule by mouth Daily.    sildenafil (VIAGRA) 100 MG tablet Take 0.5 to 1 tablet approximately 1 hour prior to sex as needed    [DISCONTINUED] azithromycin (Zithromax Z-Brennon) 250 MG tablet Take 2 tablets by mouth on day 1, then 1 tablet daily on days 2-5 (Patient not taking: Reported on 4/15/2024)    [DISCONTINUED] promethazine-dextromethorphan (PROMETHAZINE-DM) 6.25-15 MG/5ML syrup Take 5mLs by mouth At Night As Needed for Cough. (Patient not taking: Reported on 4/15/2024)     No current facility-administered medications on file prior to visit.       Vital Signs:   /83 (BP Location: Left arm, Patient Position: Sitting)   Pulse 82   Temp 98.1 °F (36.7 °C) (Oral)   Ht 182.9 cm (72.01\")   Wt (!) 141 kg (311 lb 3.2 oz)   SpO2 96%   BMI 42.20 kg/m²       Physical Exam:  Physical Exam  Vitals and nursing note reviewed.   Constitutional:       General: He is not in acute distress.     Appearance: He is not ill-appearing.   HENT:      Right Ear: Tympanic membrane and ear canal normal.      Left Ear: Tympanic membrane and ear canal normal.      Mouth/Throat:      Mouth: Mucous membranes are moist.      Comments: Pharynx appears normal  Eyes:      Extraocular Movements: Extraocular movements intact.      Pupils: Pupils are equal, round, and reactive to light.   Neck:      Thyroid: No thyromegaly.   Cardiovascular:      Rate and Rhythm: Normal rate and regular rhythm.      Heart sounds: No murmur heard.  Pulmonary:      Effort: Pulmonary effort is normal.      Breath sounds: Wheezing (Some decreased breath sounds are noted.) present.   Abdominal:      General: There is no distension.      Palpations: Abdomen is soft. There is no mass.      Tenderness: There is no abdominal tenderness.   Musculoskeletal:      Cervical back: Normal range of motion.   Skin:     " Findings: No lesion or rash.   Neurological:      General: No focal deficit present.      Mental Status: He is oriented to person, place, and time.      Cranial Nerves: No cranial nerve deficit.   Psychiatric:         Mood and Affect: Mood normal.     Result Review   The following data was reviewed by Scooby Chun MD on 04/15/2024.  Lab Results   Component Value Date    WBC 9.14 11/28/2023    HGB 12.6 (L) 11/28/2023    HCT 40.4 11/28/2023    MCV 75.7 (L) 11/28/2023     11/28/2023     Lab Results   Component Value Date    GLUCOSE 113 (H) 11/28/2023    BUN 14 11/28/2023    CREATININE 1.08 11/28/2023     11/28/2023    K 4.4 11/28/2023     11/28/2023    CO2 28.9 11/28/2023    CALCIUM 9.4 11/28/2023    PROTEINTOT 7.4 11/28/2023    ALBUMIN 4.1 11/28/2023    ALT 17 11/28/2023    AST 18 11/28/2023    ALKPHOS 107 11/28/2023    BILITOT 0.4 11/28/2023    EGFR 77.1 11/28/2023    GLOB 3.3 11/28/2023    AGRATIO 1.2 11/28/2023    BCR 13.0 11/28/2023    ANIONGAP 10.1 11/28/2023      Lab Results   Component Value Date    CHOL 108 05/30/2023    CHLPL 109 04/02/2021    TRIG 132 05/30/2023    HDL 54 05/30/2023    LDL 31 05/30/2023     Lab Results   Component Value Date    TSH 1.680 11/28/2023     Lab Results   Component Value Date    HGBA1C 7.60 (H) 11/28/2023     Lab Results   Component Value Date    PSA 0.510 05/30/2023    PSA 0.482 04/18/2022    PSA 0.47 04/02/2021          Assessment and Plan:   Today, we have reviewed his care.  He has tested negative for COVID and flu on rapid testing.  He does have some mild wheeze and chest congestion.  We will therefore cover him somewhat aggressively as noted below.  If he does not improve over the next few days, I did ask him to reach back out.    Diagnoses and all orders for this visit:    1. Bronchitis with bronchospasm (Primary)  -     POCT SARS-CoV-2 Antigen JESUS + Flu  -     albuterol sulfate  (90 Base) MCG/ACT inhaler; Inhale 2 puffs Every 6 (Six)  Hours As Needed for Wheezing.  Dispense: 18 g; Refill: 1  -     predniSONE (DELTASONE) 20 MG tablet; Take 2 tablets by oral route once daily  Dispense: 10 tablet; Refill: 0  -     amoxicillin-clavulanate (AUGMENTIN) 875-125 MG per tablet; Take 1 tablet by mouth 2 (Two) Times a Day.  Dispense: 20 tablet; Refill: 0    Follow Up  Return in about 7 weeks (around 5/31/2024) for Recheck.  Patient was given instructions and counseling regarding his condition or for health maintenance advice. Please see specific information pulled into the AVS if appropriate.

## 2024-06-13 ENCOUNTER — LAB (OUTPATIENT)
Dept: LAB | Facility: HOSPITAL | Age: 64
End: 2024-06-13
Payer: COMMERCIAL

## 2024-06-13 ENCOUNTER — OFFICE VISIT (OUTPATIENT)
Dept: FAMILY MEDICINE CLINIC | Age: 64
End: 2024-06-13
Payer: COMMERCIAL

## 2024-06-13 VITALS
SYSTOLIC BLOOD PRESSURE: 132 MMHG | WEIGHT: 305.6 LBS | HEART RATE: 77 BPM | TEMPERATURE: 98.5 F | BODY MASS INDEX: 41.39 KG/M2 | DIASTOLIC BLOOD PRESSURE: 85 MMHG | OXYGEN SATURATION: 95 % | HEIGHT: 72 IN

## 2024-06-13 DIAGNOSIS — R60.0 PEDAL EDEMA: ICD-10-CM

## 2024-06-13 DIAGNOSIS — K21.9 GASTROESOPHAGEAL REFLUX DISEASE, UNSPECIFIED WHETHER ESOPHAGITIS PRESENT: ICD-10-CM

## 2024-06-13 DIAGNOSIS — E78.2 MIXED HYPERLIPIDEMIA: ICD-10-CM

## 2024-06-13 DIAGNOSIS — E11.42 TYPE 2 DIABETES MELLITUS WITH DIABETIC POLYNEUROPATHY, WITHOUT LONG-TERM CURRENT USE OF INSULIN: ICD-10-CM

## 2024-06-13 DIAGNOSIS — I10 ESSENTIAL HYPERTENSION: ICD-10-CM

## 2024-06-13 DIAGNOSIS — Z91.09 ENVIRONMENTAL ALLERGIES: ICD-10-CM

## 2024-06-13 DIAGNOSIS — Z12.5 PROSTATE CANCER SCREENING: ICD-10-CM

## 2024-06-13 DIAGNOSIS — E11.42 TYPE 2 DIABETES MELLITUS WITH DIABETIC POLYNEUROPATHY, WITHOUT LONG-TERM CURRENT USE OF INSULIN: Primary | ICD-10-CM

## 2024-06-13 DIAGNOSIS — F17.210 NICOTINE DEPENDENCE, CIGARETTES, UNCOMPLICATED: ICD-10-CM

## 2024-06-13 LAB
HBA1C MFR BLD: 7.2 % (ref 4.8–5.6)
PSA SERPL-MCNC: 0.62 NG/ML (ref 0–4)

## 2024-06-13 PROCEDURE — G0103 PSA SCREENING: HCPCS

## 2024-06-13 PROCEDURE — 36415 COLL VENOUS BLD VENIPUNCTURE: CPT

## 2024-06-13 PROCEDURE — 99214 OFFICE O/P EST MOD 30 MIN: CPT | Performed by: FAMILY MEDICINE

## 2024-06-13 PROCEDURE — 80061 LIPID PANEL: CPT | Performed by: FAMILY MEDICINE

## 2024-06-13 PROCEDURE — 83036 HEMOGLOBIN GLYCOSYLATED A1C: CPT

## 2024-06-13 RX ORDER — FUROSEMIDE 40 MG/1
40 TABLET ORAL DAILY
Qty: 90 TABLET | Refills: 3 | Status: SHIPPED | OUTPATIENT
Start: 2024-06-13

## 2024-06-13 RX ORDER — AMLODIPINE BESYLATE 10 MG/1
10 TABLET ORAL
Qty: 90 TABLET | Refills: 3 | Status: SHIPPED | OUTPATIENT
Start: 2024-06-13

## 2024-06-13 RX ORDER — ATORVASTATIN CALCIUM 40 MG/1
40 TABLET, FILM COATED ORAL DAILY
Qty: 90 TABLET | Refills: 3 | Status: SHIPPED | OUTPATIENT
Start: 2024-06-13

## 2024-06-13 RX ORDER — FLUTICASONE PROPIONATE 50 MCG
1 SPRAY, SUSPENSION (ML) NASAL DAILY
Qty: 48 G | Refills: 3 | Status: SHIPPED | OUTPATIENT
Start: 2024-06-13

## 2024-06-13 RX ORDER — OMEPRAZOLE 40 MG/1
40 CAPSULE, DELAYED RELEASE ORAL DAILY
Qty: 90 CAPSULE | Refills: 3 | Status: SHIPPED | OUTPATIENT
Start: 2024-06-13

## 2024-06-13 RX ORDER — DULAGLUTIDE 3 MG/.5ML
3 INJECTION, SOLUTION SUBCUTANEOUS WEEKLY
Qty: 6 ML | Refills: 3 | Status: SHIPPED | OUTPATIENT
Start: 2024-06-13

## 2024-06-13 RX ORDER — MONTELUKAST SODIUM 10 MG/1
10 TABLET ORAL NIGHTLY
Qty: 30 TABLET | Refills: 1 | Status: SHIPPED | OUTPATIENT
Start: 2024-06-13

## 2024-06-13 RX ORDER — POTASSIUM CHLORIDE 750 MG/1
10 CAPSULE, EXTENDED RELEASE ORAL DAILY
Qty: 90 CAPSULE | Refills: 3 | Status: SHIPPED | OUTPATIENT
Start: 2024-06-13

## 2024-06-13 RX ORDER — LOSARTAN POTASSIUM 100 MG/1
100 TABLET ORAL DAILY
Qty: 90 TABLET | Refills: 3 | Status: SHIPPED | OUTPATIENT
Start: 2024-06-13

## 2024-06-13 NOTE — PROGRESS NOTES
Cristino Johns presents to [facility].    Chief Complaint:  Diabetes, blood pressure, cholesterol    Subjective   History of Present Illness:  Cristino is being seen today for follow-up on his care.  He has type 2 diabetes for which he remains on treatment as noted below.  Cristino does not check his blood sugar on a frequent basis.  He states when he does check it it typically is around 120.  He is due for follow-up labs today.    He also has hypertension and elevated cholesterol for which he is on treatments as noted below.  His blood pressure is reasonable here.    Review of Systems:  Review of Systems   Constitutional:  Negative for chills and fever.   HENT:  Positive for congestion.    Respiratory:  Positive for wheezing. Negative for cough and shortness of breath.    Cardiovascular:  Negative for chest pain and palpitations.   Gastrointestinal:  Negative for abdominal pain, nausea and vomiting.      Objective   Medical History:  Past Medical History:    Diabetes    GERD (gastroesophageal reflux disease)    History of COVID-19 fall of 2020    Hyperlipidemia    Hypertension    Polyp, vocal cord    Spinal headache    Urinary frequency     Past Surgical History:    COLONOSCOPY    COLONOSCOPY    Tubular adenoma    ENDOSCOPY    Procedure: ESOPHAGOGASTRODUODENOSCOPY with biopsy;  Surgeon: Vincenzo King MD;  Location: [facility] ENDOSCOPY;  Service: Gastroenterology;  Laterality: N/A;  small hiatal hernia    ESOPHAGUS SURGERY    aorta wrapped around esophagus    HERNIA REPAIR    UPPER GASTROINTESTINAL ENDOSCOPY    VASCULAR SURGERY      Family History   Problem Relation Age of Onset    Hypertension Mother     Cancer Mother     Diabetes Mother     Malig Hyperthermia Neg Hx     Colon cancer Neg Hx      Social History     Tobacco Use    Smoking status: Former     Current packs/day: 0.00     Average packs/day: 1.5 packs/day for 30.0 years (45.0 ttl pk-yrs)     Types: Cigarettes     Start  date: 1985     Quit date: 2015     Years since quittin.9     Passive exposure: Never    Smokeless tobacco: Never   Substance Use Topics    Alcohol use: Yes     Comment: little, sometimes on the weekends.       Health Maintenance Due   Topic Date Due    ZOSTER VACCINE (1 of 2) Never done    RSV Vaccine - Adults (1 - 1-dose 60+ series) Never done    HEMOGLOBIN A1C  2024    LIPID PANEL  2024    URINE MICROALBUMIN  2024    BMI FOLLOWUP  2024    LUNG CANCER SCREENING  2024        Immunization History   Administered Date(s) Administered    COVID-19 (PFIZER) Purple Cap Monovalent 2021, 2021, 2021    Pneumococcal Conjugate 20-Valent (PCV20) 2023    Pneumococcal Polysaccharide (PPSV23) 2017    Tdap 2020       Allergies   Allergen Reactions    Latex Irritability        Medications:  Current Outpatient Medications on File Prior to Visit   Medication Sig    albuterol sulfate  (90 Base) MCG/ACT inhaler Inhale 2 puffs Every 6 (Six) Hours As Needed for Wheezing.    aspirin 81 MG EC tablet Take 1 tablet by mouth Daily.    Blood Glucose Monitoring Suppl (Accu-Chek Candace Plus) w/Device kit Check blood sugar once daily    ferrous sulfate 325 (65 FE) MG tablet Take 1 tablet by mouth Daily With Breakfast.    gabapentin (NEURONTIN) 100 MG capsule Take 1 capsule by mouth Daily.    glucose blood (Accu-Chek Candace Plus) test strip Check blood sugar once daily.    loratadine (Claritin) 10 MG tablet Take 1 tablet by mouth Daily.    sildenafil (VIAGRA) 100 MG tablet Take 0.5 to 1 tablet approximately 1 hour prior to sex as needed    [DISCONTINUED] amLODIPine (NORVASC) 10 MG tablet Take 1 tablet by mouth every night at bedtime.    [DISCONTINUED] atorvastatin (LIPITOR) 40 MG tablet Take 1 tablet by mouth Daily.    [DISCONTINUED] Dulaglutide (Trulicity) 3 MG/0.5ML solution pen-injector Inject 3mg under the skin into the appropriate area as directed 1 (One) Time  "Per Week.    [DISCONTINUED] empagliflozin (JARDIANCE) 25 MG tablet tablet Take 1 tablet by mouth Daily.    [DISCONTINUED] fluticasone (FLONASE) 50 MCG/ACT nasal spray Use 1 spray into each nostril as directed by provider Daily.    [DISCONTINUED] furosemide (Lasix) 40 MG tablet Take 1 tablet by mouth Daily.    [DISCONTINUED] losartan (COZAAR) 100 MG tablet Take 1 tablet by mouth Daily.    [DISCONTINUED] metFORMIN (GLUCOPHAGE) 1000 MG tablet Take 1 tablet by mouth 2 (Two) Times a Day With Meals.    [DISCONTINUED] omeprazole (priLOSEC) 40 MG capsule Take 1 capsule by mouth Daily.    [DISCONTINUED] potassium chloride (MICRO-K) 10 MEQ CR capsule Take 1 capsule by mouth Daily.    [DISCONTINUED] amoxicillin-clavulanate (AUGMENTIN) 875-125 MG per tablet Take 1 tablet by mouth 2 (Two) Times a Day. (Patient not taking: Reported on 6/13/2024)    [DISCONTINUED] predniSONE (DELTASONE) 20 MG tablet Take 2 tablets by mouth Daily. (Patient not taking: Reported on 6/13/2024)     No current facility-administered medications on file prior to visit.       Vital Signs:   /85 (BP Location: Left arm, Patient Position: Sitting)   Pulse 77   Temp 98.5 °F (36.9 °C) (Oral)   Ht 182.9 cm (72.01\")   Wt (!) 139 kg (305 lb 9.6 oz)   SpO2 95%   BMI 41.44 kg/m²       Physical Exam:  Physical Exam  Vitals reviewed.   Constitutional:       General: He is not in acute distress.     Appearance: He is obese. He is not ill-appearing.   Eyes:      Pupils: Pupils are equal, round, and reactive to light.   Neck:      Comments: No thyromegaly  Cardiovascular:      Rate and Rhythm: Normal rate and regular rhythm.      Pulses:           Dorsalis pedis pulses are 2+ on the right side and 2+ on the left side.   Pulmonary:      Effort: Pulmonary effort is normal.      Breath sounds: Normal breath sounds.   Abdominal:      General: There is no distension.      Palpations: Abdomen is soft.      Tenderness: There is no abdominal tenderness. "   Musculoskeletal:      Cervical back: Neck supple.   Feet:      Right foot:      Protective Sensation: 3 sites tested.  3 sites sensed.      Skin integrity: Skin integrity normal. Callus present.      Toenail Condition: Right toenails are abnormally thick.      Left foot:      Protective Sensation: 3 sites tested.  3 sites sensed.      Skin integrity: Skin integrity normal. Callus present.      Toenail Condition: Left toenails are abnormally thick.      Comments: Mildly decreased sensation is present in the feet.  Calluses present on the medial aspect of both great toes.  There is no evidence of skin breakdown though.  Lymphadenopathy:      Cervical: No cervical adenopathy.   Skin:     Findings: No lesion or rash.   Neurological:      Mental Status: He is alert.       Result Review   The following data was reviewed by Scooby Chun MD on 06/13/2024.  Lab Results   Component Value Date    WBC 9.14 11/28/2023    HGB 12.6 (L) 11/28/2023    HCT 40.4 11/28/2023    MCV 75.7 (L) 11/28/2023     11/28/2023     Lab Results   Component Value Date    GLUCOSE 113 (H) 11/28/2023    BUN 14 11/28/2023    CREATININE 1.08 11/28/2023     11/28/2023    K 4.4 11/28/2023     11/28/2023    CO2 28.9 11/28/2023    CALCIUM 9.4 11/28/2023    PROTEINTOT 7.4 11/28/2023    ALBUMIN 4.1 11/28/2023    ALT 17 11/28/2023    AST 18 11/28/2023    ALKPHOS 107 11/28/2023    BILITOT 0.4 11/28/2023    EGFR 77.1 11/28/2023    GLOB 3.3 11/28/2023    AGRATIO 1.2 11/28/2023    BCR 13.0 11/28/2023    ANIONGAP 10.1 11/28/2023      Lab Results   Component Value Date    CHOL 108 05/30/2023    CHLPL 109 04/02/2021    TRIG 132 05/30/2023    HDL 54 05/30/2023    LDL 31 05/30/2023     Lab Results   Component Value Date    TSH 1.680 11/28/2023     Lab Results   Component Value Date    HGBA1C 7.60 (H) 11/28/2023     Lab Results   Component Value Date    PSA 0.510 05/30/2023    PSA 0.482 04/18/2022    PSA 0.47 04/02/2021     Class 3 Severe  Obesity (BMI >=40). Obesity-related health conditions include the following: obstructive sleep apnea, hypertension, diabetes mellitus, and dyslipidemias. Obesity is improving with treatment. BMI is is above average; BMI management plan is completed. We discussed portion control, increasing exercise, and pharmacologic options including GLP-1 agonist .         Assessment and Plan:   Today, we have reviewed his care.  Cristino seems to be doing well overall.  His blood pressure is well-controlled here.  He did have recent labs through the [facility] that I have been able to review.  We will update our flowsheet with those numbers.  Some lab work will be done today for things that were not checked there.  Otherwise, we will refill his medications and tentatively plan to see him back in about 6 months, sooner if needed.  We discussed that we may have to replace Trulicity at some point this year depending on availability or other factors.    Diagnoses and all orders for this visit:    1. Type 2 diabetes mellitus with diabetic polyneuropathy, without long-term current use of insulin (Primary)  -     Hemoglobin A1c; Future  -     Dulaglutide (Trulicity) 3 MG/0.5ML solution pen-injector; Inject 3mg under the skin into the appropriate area as directed 1 (One) Time Per Week.  Dispense: 6 mL; Refill: 3  -     empagliflozin (JARDIANCE) 25 MG tablet tablet; Take 1 tablet by mouth Daily.  Dispense: 90 tablet; Refill: 3  -     metFORMIN (GLUCOPHAGE) 1000 MG tablet; Take 1 tablet by mouth 2 (Two) Times a Day With Meals.  Dispense: 180 tablet; Refill: 3    2. Essential hypertension  -     amLODIPine (NORVASC) 10 MG tablet; Take 1 tablet by mouth every night at bedtime.  Dispense: 90 tablet; Refill: 3  -     losartan (COZAAR) 100 MG tablet; Take 1 tablet by mouth Daily.  Dispense: 90 tablet; Refill: 3  -     potassium chloride (MICRO-K) 10 MEQ CR capsule; Take 1 capsule by mouth Daily.  Dispense: 90 capsule; Refill: 3    3. Environmental  allergies  -     fluticasone (FLONASE) 50 MCG/ACT nasal spray; Use 1 spray into each nostril as directed by provider Daily.  Dispense: 48 g; Refill: 3    4. Mixed hyperlipidemia  -     atorvastatin (LIPITOR) 40 MG tablet; Take 1 tablet by mouth Daily.  Dispense: 90 tablet; Refill: 3    5. Gastroesophageal reflux disease, unspecified whether esophagitis present  -     omeprazole (priLOSEC) 40 MG capsule; Take 1 capsule by mouth Daily.  Dispense: 90 capsule; Refill: 3    6. Pedal edema  -     furosemide (Lasix) 40 MG tablet; Take 1 tablet by mouth Daily.  Dispense: 90 tablet; Refill: 3    7. Prostate cancer screening  -     PSA Screen; Future    8. Nicotine dependence, cigarettes, uncomplicated  -     CT Chest Low Dose Wo; Future    Follow Up  Return in about 6 months (around 12/13/2024) for Recheck.  Patient was given instructions and counseling regarding his condition or for health maintenance advice. Please see specific information pulled into the AVS if appropriate.

## 2024-06-13 NOTE — PROGRESS NOTES
Please update our flowsheet with the following labs dated 2/2/2024:  Urine microalbumin 3.0  Hemoglobin 13.2  Hematocrit 43.9  Creatinine 0.90  AST 17  ALT 26  Hemoglobin A1c 6.6%  PSA 0.528    And from 7/26/2023:  Total cholesterol 126  Triglycerides 101  HDL 52  LDL 53

## 2024-06-14 LAB
ALBUMIN UR-MCNC: 3 MG/L
ALT SERPL W P-5'-P-CCNC: 26 U/L (ref 1–41)
AST SERPL-CCNC: 17 U/L (ref 1–40)
CHOLEST SERPL-MCNC: 123 MG/DL (ref 0–200)
CHOLEST SERPL-MCNC: 126 MG/DL
CREATININE: 0.9
HBA1C MFR BLD: 6.6 %
HCT VFR BLD AUTO: 43.9 % (ref 37.5–51)
HDLC SERPL-MCNC: 52 MG/DL (ref 40–60)
HDLC SERPL-MCNC: 54 MG/DL (ref 40–60)
HGB BLD-MCNC: 13.2 G/DL (ref 13–17.7)
LDLC SERPL CALC-MCNC: 49 MG/DL (ref 0–100)
LDLC SERPL CALC-MCNC: 53 MG/DL
LDLC/HDLC SERPL: 0.86 {RATIO}
PSA SERPL-MCNC: 0.53 NG/ML (ref 0–4)
TRIGL SERPL-MCNC: 101 MG/DL (ref 0–150)
TRIGL SERPL-MCNC: 114 MG/DL (ref 0–150)
VLDLC SERPL-MCNC: 20 MG/DL (ref 5–40)

## 2024-06-21 ENCOUNTER — HOSPITAL ENCOUNTER (OUTPATIENT)
Dept: CT IMAGING | Facility: HOSPITAL | Age: 64
Discharge: HOME | End: 2024-06-21
Admitting: FAMILY MEDICINE
Payer: COMMERCIAL

## 2024-06-21 DIAGNOSIS — F17.210 NICOTINE DEPENDENCE, CIGARETTES, UNCOMPLICATED: ICD-10-CM

## 2024-06-21 PROCEDURE — 71271 CT THORAX LUNG CANCER SCR C-: CPT

## 2024-09-14 ENCOUNTER — TELEPHONE (OUTPATIENT)
Dept: FAMILY MEDICINE CLINIC | Age: 64
End: 2024-09-14
Payer: COMMERCIAL

## 2024-09-14 NOTE — TELEPHONE ENCOUNTER
Please reach out to Cristino and let him know that had a reminder to review his chart.  I would recommend he stop by the allergy room at his convenience for a fingerstick A1c.  Let me know if he has other concerns.  Thanks.

## 2024-09-17 ENCOUNTER — CLINICAL SUPPORT (OUTPATIENT)
Dept: FAMILY MEDICINE CLINIC | Age: 64
End: 2024-09-17
Payer: COMMERCIAL

## 2024-09-17 DIAGNOSIS — E66.01 MORBID OBESITY: ICD-10-CM

## 2024-09-17 DIAGNOSIS — E11.42 TYPE 2 DIABETES MELLITUS WITH DIABETIC POLYNEUROPATHY, WITHOUT LONG-TERM CURRENT USE OF INSULIN: Primary | ICD-10-CM

## 2024-09-17 LAB
EXPIRATION DATE: ABNORMAL
HBA1C MFR BLD: 7.3 % (ref 4.5–5.7)
Lab: ABNORMAL

## 2024-09-17 PROCEDURE — 83036 HEMOGLOBIN GLYCOSYLATED A1C: CPT | Performed by: FAMILY MEDICINE

## 2024-09-18 RX ORDER — DULAGLUTIDE 4.5 MG/.5ML
4.5 INJECTION, SOLUTION SUBCUTANEOUS WEEKLY
Qty: 6 ML | Refills: 3 | Status: SHIPPED | OUTPATIENT
Start: 2024-09-18

## 2024-10-29 ENCOUNTER — TELEPHONE (OUTPATIENT)
Dept: FAMILY MEDICINE CLINIC | Age: 64
End: 2024-10-29
Payer: COMMERCIAL

## 2024-10-29 NOTE — TELEPHONE ENCOUNTER
Pt dropped off [place] papers and paid the $20 fee. Papers scanned in and placed in [place]'s mailbox.

## 2024-11-25 ENCOUNTER — OFFICE VISIT (OUTPATIENT)
Dept: FAMILY MEDICINE CLINIC | Age: 64
End: 2024-11-25
Payer: COMMERCIAL

## 2024-11-25 ENCOUNTER — HOSPITAL ENCOUNTER (OUTPATIENT)
Dept: ULTRASOUND IMAGING | Facility: HOSPITAL | Age: 64
Discharge: HOME | End: 2024-11-25
Payer: COMMERCIAL

## 2024-11-25 ENCOUNTER — HOSPITAL ENCOUNTER (OUTPATIENT)
Dept: GENERAL RADIOLOGY | Facility: HOSPITAL | Age: 64
Discharge: HOME | End: 2024-11-25
Payer: COMMERCIAL

## 2024-11-25 VITALS
TEMPERATURE: 97.8 F | SYSTOLIC BLOOD PRESSURE: 137 MMHG | HEART RATE: 90 BPM | WEIGHT: 310.6 LBS | OXYGEN SATURATION: 97 % | HEIGHT: 72 IN | BODY MASS INDEX: 42.07 KG/M2 | DIASTOLIC BLOOD PRESSURE: 81 MMHG

## 2024-11-25 DIAGNOSIS — M79.89 PAIN AND SWELLING OF LEFT LOWER LEG: ICD-10-CM

## 2024-11-25 DIAGNOSIS — R60.0 EDEMA OF LEFT LOWER EXTREMITY: Primary | ICD-10-CM

## 2024-11-25 DIAGNOSIS — M79.662 PAIN AND SWELLING OF LEFT LOWER LEG: ICD-10-CM

## 2024-11-25 PROCEDURE — 73610 X-RAY EXAM OF ANKLE: CPT

## 2024-11-25 PROCEDURE — 93971 EXTREMITY STUDY: CPT

## 2024-11-25 PROCEDURE — 99214 OFFICE O/P EST MOD 30 MIN: CPT | Performed by: INTERNAL MEDICINE

## 2024-11-25 RX ORDER — CETIRIZINE HYDROCHLORIDE 10 MG/1
1 TABLET ORAL DAILY PRN
COMMUNITY
Start: 2024-09-26

## 2024-11-25 NOTE — PROGRESS NOTES
"Chief Complaint  Pain (Lt foot/ankle; unable to walk on it; swollen/warm to touch )    Subjective      Cristino Johns is a 63 y.o. male who presents to [facility]     Patient Care Team:  Scooby Chun MD as PCP - General (Family Medicine)  Patient presents with complaints of left lower extremity swelling and ankle pain.  [place] in the mall on Saturday and and now has  significant pain and swelling in his left leg.  He has had difficulty walking on it.  He denies any trauma or injury to the area.  Denies any fevers, chills, or shortness of breath. Denies history of blood clots.  Blood sugars are controlled with HgbA1C of 7.3 2 months ago.    Review of Systems  Full review of systems obtained and pertinent positives states in above HPI.  Otherwise all others reviewed and are negative.    Objective   Vital Signs:   Vitals:    11/25/24 1124   BP: 137/81   BP Location: Left arm   Patient Position: Sitting   Cuff Size: Large Adult   Pulse: 90   Temp: 97.8 °F (36.6 °C)   TempSrc: Oral   SpO2: 97%   Weight: (!) 141 kg (310 lb 9.6 oz)   Height: 182.9 cm (72.01\")     Body mass index is 42.12 kg/m².    Wt Readings from Last 3 Encounters:   11/25/24 (!) 141 kg (310 lb 9.6 oz)   06/13/24 (!) 139 kg (305 lb 9.6 oz)   04/15/24 (!) 141 kg (311 lb 3.2 oz)     BP Readings from Last 3 Encounters:   11/25/24 137/81   06/13/24 132/85   04/15/24 137/83       Health Maintenance   Topic Date Due    ZOSTER VACCINE (1 of 2) Never done    INFLUENZA VACCINE  Never done    COVID-19 Vaccine (4 - 2024-25 season) 09/01/2024    ANNUAL PHYSICAL  11/28/2024    HEMOGLOBIN A1C  03/17/2025    DIABETIC EYE EXAM  05/09/2025    DIABETIC FOOT EXAM  06/13/2025    LIPID PANEL  06/13/2025    BMI FOLLOWUP  06/13/2025    LUNG CANCER SCREENING  06/21/2025    COLORECTAL CANCER SCREENING  10/29/2026    TDAP/TD VACCINES (2 - Td or Tdap) 05/07/2030    HEPATITIS C SCREENING  Completed    Pneumococcal Vaccine 0-64  " Completed    URINE MICROALBUMIN  Discontinued       Physical Exam   GEN:NAD, well nourished  HEENT:NCAT, PERRL, EOMI, OP clear, MMM  CVA:RRR s1, s2 no m/r/g  CHEST:CTA B no wheezes or rhonchi  ABD:soft, nontender, nondistended +bs  EXT:LLE with 1-2+ edema to mid calf.  No warmth but tender to touch of ankle and mid calf. 2+PP, DROM of ankle due to pain  NEURO:CN II-XII grossly nonfocal, no evidence of asterixes or tremor  PSYCH: appropriate mood and affect  :deferred  SKIN: warm, dry, intact, no lesions or rashes    Result Review   The following data was reviewed by: Rogers Kapoor MD on 11/25/2024:  [x]  Tests & Results  []  Hospitalization/Emergency Department/Urgent Care  []  Internal/External Consultant Notes    Procedures          ASSESSMENT/PLAN  Diagnoses and all orders for this visit:    1. Edema of left lower extremity (Primary)  Comments:  obtain doppler us to rule out DVT  check xray of ankle  Orders:  -     US Venous Doppler Lower Extremity Left (duplex)  -     XR Ankle 3+ View Left  -     Miscellaneous DME    2. Pain and swelling of left lower leg  Comments:  Face to face encounter with pain on weight bearing, abnormal gait with unsteadiness. Needs cane for ambulation possible tendon tear/muscle sprain.                         FOLLOW UP  No follow-ups on file.  Patient was given instructions and counseling regarding his condition or for health maintenance advice. Please see specific information pulled into the AVS if appropriate.       Rogers Kapoor MD  11/25/24  12:04 EST

## 2024-11-26 ENCOUNTER — TELEPHONE (OUTPATIENT)
Dept: FAMILY MEDICINE CLINIC | Age: 64
End: 2024-11-26
Payer: COMMERCIAL

## 2024-11-26 DIAGNOSIS — R60.0 EDEMA OF LEFT LOWER EXTREMITY: Primary | ICD-10-CM

## 2024-11-26 RX ORDER — HYDROCODONE BITARTRATE AND ACETAMINOPHEN 5; 325 MG/1; MG/1
1 TABLET ORAL EVERY 6 HOURS PRN
Qty: 20 TABLET | Refills: 0 | Status: SHIPPED | OUTPATIENT
Start: 2024-11-26

## 2024-11-26 NOTE — TELEPHONE ENCOUNTER
Spoke Mrs. Johns on the phone.  Mr. Johns continues to have pain and discomfort in his leg although the swelling has improved he continues to be unable to place weight on the foot.  No pain in the hip.  The results of the x-ray of the ankle as well as the ultrasound were discussed and are negative.      Plan will be to call in Norco 1 every 6 hours as needed for pain dispense 20 sent to the [facility] pharmacy today .   Have him return for evaluation tomorrow if his pain is not better   Urgent referral to orthopedic surgery.              pt is asking what pt can take for pain.

## 2024-11-26 NOTE — TELEPHONE ENCOUNTER
Caller: SPAIN,MIRANDA    Relationship: Emergency Contact    Best call back number: [phone]     Caller requesting test results: SPOUSE    What test was performed: US CHECKING FOR BLOOD CLOT    When was the test performed: 11.25.2024    Where was the test performed: [facility]    Additional notes: PLEASE ADVISE RESULTS.

## 2024-12-03 ENCOUNTER — LAB (OUTPATIENT)
Dept: LAB | Facility: HOSPITAL | Age: 64
End: 2024-12-03
Payer: COMMERCIAL

## 2024-12-03 ENCOUNTER — OFFICE VISIT (OUTPATIENT)
Dept: FAMILY MEDICINE CLINIC | Age: 64
End: 2024-12-03
Payer: COMMERCIAL

## 2024-12-03 VITALS
WEIGHT: 302 LBS | OXYGEN SATURATION: 98 % | HEIGHT: 72 IN | TEMPERATURE: 98 F | HEART RATE: 98 BPM | DIASTOLIC BLOOD PRESSURE: 75 MMHG | SYSTOLIC BLOOD PRESSURE: 132 MMHG | BODY MASS INDEX: 40.9 KG/M2

## 2024-12-03 DIAGNOSIS — M25.572 ACUTE LEFT ANKLE PAIN: Primary | ICD-10-CM

## 2024-12-03 DIAGNOSIS — M25.572 ACUTE LEFT ANKLE PAIN: ICD-10-CM

## 2024-12-03 LAB
ANION GAP SERPL CALCULATED.3IONS-SCNC: 9.8 MMOL/L (ref 5–15)
BASOPHILS # BLD AUTO: 0.04 10*3/MM3 (ref 0–0.2)
BASOPHILS NFR BLD AUTO: 0.4 % (ref 0–1.5)
BUN SERPL-MCNC: 14 MG/DL (ref 8–23)
BUN/CREAT SERPL: 14.3 (ref 7–25)
CALCIUM SPEC-SCNC: 9.9 MG/DL (ref 8.6–10.5)
CHLORIDE SERPL-SCNC: 102 MMOL/L (ref 98–107)
CO2 SERPL-SCNC: 26.2 MMOL/L (ref 22–29)
CREAT SERPL-MCNC: 0.98 MG/DL (ref 0.76–1.27)
CRP SERPL-MCNC: 1.82 MG/DL (ref 0–0.5)
DEPRECATED RDW RBC AUTO: 41.3 FL (ref 37–54)
EGFRCR SERPLBLD CKD-EPI 2021: 86.1 ML/MIN/1.73
EOSINOPHIL # BLD AUTO: 0.13 10*3/MM3 (ref 0–0.4)
EOSINOPHIL NFR BLD AUTO: 1.2 % (ref 0.3–6.2)
ERYTHROCYTE [DISTWIDTH] IN BLOOD BY AUTOMATED COUNT: 14.7 % (ref 12.3–15.4)
ERYTHROCYTE [SEDIMENTATION RATE] IN BLOOD: 22 MM/HR (ref 0–20)
GLUCOSE SERPL-MCNC: 120 MG/DL (ref 65–99)
HCT VFR BLD AUTO: 44.7 % (ref 37.5–51)
HGB BLD-MCNC: 13.9 G/DL (ref 13–17.7)
IMM GRANULOCYTES # BLD AUTO: 0.01 10*3/MM3 (ref 0–0.05)
IMM GRANULOCYTES NFR BLD AUTO: 0.1 % (ref 0–0.5)
LYMPHOCYTES # BLD AUTO: 1.51 10*3/MM3 (ref 0.7–3.1)
LYMPHOCYTES NFR BLD AUTO: 13.9 % (ref 19.6–45.3)
MCH RBC QN AUTO: 23.9 PG (ref 26.6–33)
MCHC RBC AUTO-ENTMCNC: 31.1 G/DL (ref 31.5–35.7)
MCV RBC AUTO: 76.8 FL (ref 79–97)
MONOCYTES # BLD AUTO: 0.63 10*3/MM3 (ref 0.1–0.9)
MONOCYTES NFR BLD AUTO: 5.8 % (ref 5–12)
NEUTROPHILS NFR BLD AUTO: 78.6 % (ref 42.7–76)
NEUTROPHILS NFR BLD AUTO: 8.55 10*3/MM3 (ref 1.7–7)
PLATELET # BLD AUTO: 384 10*3/MM3 (ref 140–450)
PMV BLD AUTO: 9.3 FL (ref 6–12)
POTASSIUM SERPL-SCNC: 4.1 MMOL/L (ref 3.5–5.2)
RBC # BLD AUTO: 5.82 10*6/MM3 (ref 4.14–5.8)
SODIUM SERPL-SCNC: 138 MMOL/L (ref 136–145)
URATE SERPL-MCNC: 4.7 MG/DL (ref 3.4–7)
WBC NRBC COR # BLD AUTO: 10.87 10*3/MM3 (ref 3.4–10.8)

## 2024-12-03 PROCEDURE — 84550 ASSAY OF BLOOD/URIC ACID: CPT

## 2024-12-03 PROCEDURE — 85652 RBC SED RATE AUTOMATED: CPT

## 2024-12-03 PROCEDURE — 80048 BASIC METABOLIC PNL TOTAL CA: CPT

## 2024-12-03 PROCEDURE — 86140 C-REACTIVE PROTEIN: CPT

## 2024-12-03 PROCEDURE — 85025 COMPLETE CBC W/AUTO DIFF WBC: CPT

## 2024-12-03 PROCEDURE — 99214 OFFICE O/P EST MOD 30 MIN: CPT | Performed by: FAMILY MEDICINE

## 2024-12-03 PROCEDURE — 36415 COLL VENOUS BLD VENIPUNCTURE: CPT

## 2024-12-03 RX ORDER — PREDNISONE 20 MG/1
TABLET ORAL
Qty: 14 TABLET | Refills: 0 | Status: SHIPPED | OUTPATIENT
Start: 2024-12-03 | End: 2024-12-15

## 2024-12-03 NOTE — PROGRESS NOTES
Cristino Johns presents to [facility].    Chief Complaint:  Left ankle pain, edema    Subjective   History of Present Illness:  Cristino is being seen today for follow-up on left ankle pain and swelling.  He has been struggling with this for the last 10 days.  He went to [employer] the day prior and noticed a little bit of pain.  However, the pain became much more severe the following day with a lot of swelling.  He was seen here last week by Dr. Kapoor and had venous Doppler and x-ray.  The venous Doppler was negative for blood clot, but the x-ray showed some joint effusion.  Cristino continues to have significant pain and some swelling of the ankle.  He actually has not been able to work or even get out of the house because of the severity of the pain.    Review of Systems:  Review of Systems   Constitutional:  Negative for chills and fever.   Respiratory:  Negative for cough and shortness of breath.    Cardiovascular:  Negative for chest pain and palpitations.   Gastrointestinal:  Negative for abdominal pain, nausea and vomiting.      Objective   Medical History:  Past Medical History:    Diabetes    GERD (gastroesophageal reflux disease)    History of COVID-19    fall of 2020    Hyperlipidemia    Hypertension    Polyp, vocal cord    Spinal headache    Urinary frequency     Past Surgical History:    COLONOSCOPY    COLONOSCOPY    Tubular adenoma    ENDOSCOPY    Procedure: ESOPHAGOGASTRODUODENOSCOPY with biopsy;  Surgeon: Vincenzo King MD;  Location: [facility] ENDOSCOPY;  Service: Gastroenterology;  Laterality: N/A;  small hiatal hernia    ESOPHAGUS SURGERY    aorta wrapped around esophagus    HERNIA REPAIR    UPPER GASTROINTESTINAL ENDOSCOPY    VASCULAR SURGERY      Family History   Problem Relation Age of Onset    Hypertension Mother     Cancer Mother     Diabetes Mother     Malig Hyperthermia Neg Hx     Colon cancer Neg Hx      Social History     Tobacco Use    Smoking status:  Former     Current packs/day: 0.00     Average packs/day: 1.5 packs/day for 30.0 years (45.0 ttl pk-yrs)     Types: Cigarettes     Start date: 1985     Quit date: 2015     Years since quittin.4     Passive exposure: Never    Smokeless tobacco: Never   Substance Use Topics    Alcohol use: Yes     Comment: little, sometimes on the weekends.       Health Maintenance Due   Topic Date Due    ZOSTER VACCINE (1 of 2) Never done    ANNUAL PHYSICAL  2024        Immunization History   Administered Date(s) Administered    COVID-19 (PFIZER) Purple Cap Monovalent 2021, 2021, 2021    Pneumococcal Conjugate 20-Valent (PCV20) 2023    Pneumococcal Polysaccharide (PPSV23) 2017    Tdap 2020       Allergies   Allergen Reactions    Latex Irritability        Medications:    Current Outpatient Medications:     albuterol sulfate  (90 Base) MCG/ACT inhaler, Inhale 2 puffs Every 6 (Six) Hours As Needed for Wheezing., Disp: 18 g, Rfl: 1    amLODIPine (NORVASC) 10 MG tablet, Take 1 tablet by mouth every night at bedtime., Disp: 90 tablet, Rfl: 3    aspirin 81 MG EC tablet, Take 1 tablet by mouth Daily., Disp: , Rfl:     atorvastatin (LIPITOR) 40 MG tablet, Take 1 tablet by mouth Daily., Disp: 90 tablet, Rfl: 3    Blood Glucose Monitoring Suppl (Accu-Chek Candace Plus) w/Device kit, Check blood sugar once daily, Disp: 1 kit, Rfl: 3    cetirizine (zyrTEC) 10 MG tablet, Take 1 tablet by mouth Daily As Needed., Disp: , Rfl:     Diclofenac Sodium (VOLTAREN) 1 % gel gel, Apply  topically to the appropriate area as directed., Disp: , Rfl:     Dulaglutide (Trulicity) 4.5 MG/0.5ML solution auto-injector, Inject 4.5mg under the skin into the appropriate area as directed 1 (One) Time Per Week., Disp: 6 mL, Rfl: 3    empagliflozin (JARDIANCE) 25 MG tablet tablet, Take 1 tablet by mouth Daily., Disp: 90 tablet, Rfl: 3    ferrous sulfate 325 (65 FE) MG tablet, Take 1 tablet by mouth Daily With  Breakfast., Disp: , Rfl:     fluticasone (FLONASE) 50 MCG/ACT nasal spray, Use 1 spray into each nostril as directed by provider Daily., Disp: 48 g, Rfl: 3    furosemide (Lasix) 40 MG tablet, Take 1 tablet by mouth Daily., Disp: 90 tablet, Rfl: 3    gabapentin (NEURONTIN) 100 MG capsule, Take 1 capsule by mouth Daily., Disp: , Rfl:     glucose blood (Accu-Chek Candace Plus) test strip, Check blood sugar once daily., Disp: 100 each, Rfl: 3    HYDROcodone-acetaminophen (NORCO) 5-325 MG per tablet, Take 1 tablet by mouth Every 6 (Six) Hours As Needed for Moderate Pain., Disp: 20 tablet, Rfl: 0    loratadine (Claritin) 10 MG tablet, Take 1 tablet by mouth Daily., Disp: 90 tablet, Rfl: 3    losartan (COZAAR) 100 MG tablet, Take 1 tablet by mouth Daily., Disp: 90 tablet, Rfl: 3    metFORMIN (GLUCOPHAGE) 1000 MG tablet, Take 1 tablet by mouth 2 (Two) Times a Day With Meals., Disp: 180 tablet, Rfl: 3    montelukast (Singulair) 10 MG tablet, Take 1 tablet by mouth Every Night., Disp: 30 tablet, Rfl: 1    neomycin-polymyxin-dexamethasone (MAXITROL) 3.5-92411-3.1 ophthalmic suspension, Administer 1 drop to both eyes 4 (Four) Times a Day., Disp: 5 mL, Rfl: 0    Olopatadine HCl 0.7 % solution, Apply  to eye(s) as directed by provider., Disp: , Rfl:     omeprazole (priLOSEC) 40 MG capsule, Take 1 capsule by mouth Daily., Disp: 90 capsule, Rfl: 3    Polyvinyl Alcohol-Povidone PF (ARTIFICIAL TEARS) 1.4-0.6 % ophthalmic solution, Apply  to eye(s) as directed by provider., Disp: , Rfl:     potassium chloride (MICRO-K) 10 MEQ CR capsule, Take 1 capsule by mouth Daily., Disp: 90 capsule, Rfl: 3    sildenafil (VIAGRA) 100 MG tablet, Take 0.5 to 1 tablet approximately 1 hour prior to sex as needed, Disp: 30 tablet, Rfl: 2    predniSONE (DELTASONE) 20 MG tablet, Take 2 tablets by mouth Daily for 4 days, THEN 1 tablet Daily for 4 days, THEN 0.5 tablets Daily for 4 days., Disp: 14 tablet, Rfl: 0    Vital Signs:   /75 (BP Location: Left  "arm, Patient Position: Sitting)   Pulse 98   Temp 98 °F (36.7 °C) (Oral)   Ht 182.9 cm (72.01\")   Wt (!) 137 kg (302 lb)   SpO2 98%   BMI 40.95 kg/m²       Physical Exam:  Physical Exam  Vitals reviewed.   Constitutional:       General: He is not in acute distress.     Appearance: He is not ill-appearing.   Eyes:      Pupils: Pupils are equal, round, and reactive to light.   Neck:      Comments: No thyromegaly  Cardiovascular:      Rate and Rhythm: Normal rate and regular rhythm.   Pulmonary:      Effort: Pulmonary effort is normal.      Breath sounds: Normal breath sounds.   Abdominal:      General: There is no distension.      Palpations: Abdomen is soft.      Tenderness: There is no abdominal tenderness.   Musculoskeletal:         General: Swelling (There is mild edema of the left ankle.  No focal finding otherwise is present.  The Achilles is intact.) present.      Cervical back: Neck supple.   Lymphadenopathy:      Cervical: No cervical adenopathy.   Skin:     Findings: No lesion or rash.   Neurological:      Mental Status: He is alert.     Result Review   The following data was reviewed by Scooby Chun MD on 12/03/2024.  Lab Results   Component Value Date    WBC 9.14 11/28/2023    HGB 13.2 02/02/2024    HCT 43.9 02/02/2024    MCV 75.7 (L) 11/28/2023     11/28/2023     Lab Results   Component Value Date    GLUCOSE 113 (H) 11/28/2023    BUN 14 11/28/2023    CREATININE 1.08 11/28/2023     11/28/2023    K 4.4 11/28/2023     11/28/2023    CALCIUM 9.4 11/28/2023    PROTEINTOT 7.4 11/28/2023    ALBUMIN 4.1 11/28/2023    ALT 26 02/02/2024    AST 17 02/02/2024    ALKPHOS 107 11/28/2023    BILITOT 0.4 11/28/2023    GLOB 3.3 11/28/2023    AGRATIO 1.2 11/28/2023    BCR 13.0 11/28/2023    ANIONGAP 10.1 11/28/2023    EGFR 77.1 11/28/2023     Lab Results   Component Value Date    CHOL 123 06/13/2024    CHLPL 126 07/06/2023    TRIG 114 06/13/2024    HDL 54 06/13/2024    LDL 49 06/13/2024 "     Lab Results   Component Value Date    TSH 1.680 11/28/2023     Lab Results   Component Value Date    HGBA1C 7.3 (A) 09/17/2024     Lab Results   Component Value Date    PSA 0.623 06/13/2024    PSA 0.528 02/02/2024    PSA 0.510 05/30/2023     US Venous Doppler Lower Extremity Left (duplex) (11/25/2024 15:28)  XR Ankle 3+ View Left (11/25/2024 14:57)         Assessment and Plan:   Today, we have reviewed his care.  He did have testing last week as noted that did not point to a worrisome finding.  He has seen some improvement with the symptoms.  I remain concerned that this episode could represent an inflammatory issue such as gout.  We will move ahead with a laboratory evaluation in part related to that.  We will see what the testing shows and reach back out to him tomorrow morning.  Because of the likelihood that there is an inflammatory issue in play, we will move ahead with a steroid taper.  I asked him not to take it until he hears back from us later today though.    Diagnoses and all orders for this visit:    1. Acute left ankle pain (Primary)  -     CBC Auto Differential; Future  -     Basic Metabolic Panel; Future  -     Sedimentation Rate; Future  -     C-reactive Protein; Future  -     Uric Acid; Future  -     predniSONE (DELTASONE) 20 MG tablet; Take 2 tablets by mouth Daily for 4 days, THEN 1 tablet Daily for 4 days, THEN 0.5 tablets Daily for 4 days.  Dispense: 14 tablet; Refill: 0    Follow Up  Return if symptoms worsen or fail to improve.  Patient was given instructions and counseling regarding his condition or for health maintenance advice. Please see specific information pulled into the AVS if appropriate.

## 2024-12-04 LAB
ALT SERPL W P-5'-P-CCNC: 22 U/L (ref 1–41)
CHOLEST SERPL-MCNC: 117 MG/DL
CREATININE: 1.05
HBA1C MFR BLD: 6.8 %
HDLC SERPL-MCNC: 44 MG/DL (ref 40–60)
LDLC SERPL CALC-MCNC: 54.8 MG/DL
TRIGL SERPL-MCNC: 91 MG/DL (ref 0–150)

## 2024-12-04 NOTE — PROGRESS NOTES
Please update the following labs in our flowsheet for 8/5/2024:  Total cholesterol 117  Triglycerides 91  HDL 44  LDL 54.8  Creatinine 1.05  ALT 22  A1c 6.8%    Thanks.

## 2024-12-11 ENCOUNTER — TELEPHONE (OUTPATIENT)
Dept: FAMILY MEDICINE CLINIC | Age: 64
End: 2024-12-11
Payer: COMMERCIAL

## 2024-12-11 NOTE — TELEPHONE ENCOUNTER
----- Message from Karine JUAREZ sent at 12/4/2024 10:17 AM EST -----  TICKLE to call him again in 1 week to see how he is doing with regard to the ankle pain and swelling.

## 2024-12-11 NOTE — TELEPHONE ENCOUNTER
Pt states the pain is better, it is still swollen some. He states pain has moved to under and behind his heel. He is still having to use a cane to walk due to discomfort.

## 2024-12-12 ENCOUNTER — TELEPHONE (OUTPATIENT)
Dept: FAMILY MEDICINE CLINIC | Age: 64
End: 2024-12-12
Payer: COMMERCIAL

## 2024-12-12 NOTE — TELEPHONE ENCOUNTER
Caller: Cristino Johns    Relationship: Self    Best call back number: [phone]    What is the best time to reach you: ANYTIME    Who are you requesting to speak with (clinical staff, provider,  specific staff member): NURSE     What was the call regarding: PATIENT WOULD LIKE A CALL BACK REGARDING HIS ANKLE.

## 2024-12-12 NOTE — TELEPHONE ENCOUNTER
Pt states he is still having the pain up behind his knee and is requesting rx for low dose steroid be sent to pharm. Please advise.

## 2024-12-12 NOTE — TELEPHONE ENCOUNTER
Steroids are meant to be used for a brief period of time.  If he is having ongoing difficulty and needs additional treatment, I would recommend he be seen again.  I can work him in tomorrow.  Thanks

## 2024-12-13 ENCOUNTER — OFFICE VISIT (OUTPATIENT)
Dept: FAMILY MEDICINE CLINIC | Age: 64
End: 2024-12-13
Payer: COMMERCIAL

## 2024-12-13 VITALS
HEART RATE: 81 BPM | TEMPERATURE: 98 F | HEIGHT: 72 IN | BODY MASS INDEX: 41.61 KG/M2 | OXYGEN SATURATION: 97 % | DIASTOLIC BLOOD PRESSURE: 71 MMHG | SYSTOLIC BLOOD PRESSURE: 121 MMHG | WEIGHT: 307.2 LBS

## 2024-12-13 DIAGNOSIS — M25.572 ACUTE LEFT ANKLE PAIN: Primary | ICD-10-CM

## 2024-12-13 PROCEDURE — 99213 OFFICE O/P EST LOW 20 MIN: CPT | Performed by: FAMILY MEDICINE

## 2024-12-13 RX ORDER — MELOXICAM 15 MG/1
15 TABLET ORAL DAILY
Qty: 30 TABLET | Refills: 1 | Status: SHIPPED | OUTPATIENT
Start: 2024-12-13

## 2024-12-13 NOTE — PROGRESS NOTES
Cristino Johns presents to [facility].    Chief Complaint:  Left ankle pain    Subjective   History of Present Illness:  Cristino is back today for follow-up on left ankle pain.  Please see his most recent visit note.  After obtaining labs and other testing, we opted to move forward with a prednisone taper.  He did see significant improvement while on that.  He was using a cane when he was here most recently, but he has been able to stop that.  He does have ongoing pain in the ankle and the lateral foot.  He is asking whether we should continue the steroid or consider other treatment.  It has been recommended that he try to avoid NSAIDs because of his other health issues.    Review of Systems:  Review of Systems   Constitutional:  Negative for chills and fever.   Respiratory:  Negative for cough and shortness of breath.    Cardiovascular:  Negative for chest pain and palpitations.   Gastrointestinal:  Negative for abdominal pain, nausea and vomiting.      Objective   Medical History:  Past Medical History:    Diabetes    GERD (gastroesophageal reflux disease)    History of COVID-19    fall of 2020    Hyperlipidemia    Hypertension    Polyp, vocal cord    Spinal headache    Urinary frequency     Past Surgical History:    COLONOSCOPY    COLONOSCOPY    Tubular adenoma    ENDOSCOPY    Procedure: ESOPHAGOGASTRODUODENOSCOPY with biopsy;  Surgeon: Vincenzo King MD;  Location: [facility] ENDOSCOPY;  Service: Gastroenterology;  Laterality: N/A;  small hiatal hernia    ESOPHAGUS SURGERY    aorta wrapped around esophagus    HERNIA REPAIR    UPPER GASTROINTESTINAL ENDOSCOPY    VASCULAR SURGERY      Family History   Problem Relation Age of Onset    Hypertension Mother     Cancer Mother     Diabetes Mother     Malig Hyperthermia Neg Hx     Colon cancer Neg Hx      Social History     Tobacco Use    Smoking status: Former     Current packs/day: 0.00     Average packs/day: 1.5 packs/day for 30.0  years (45.0 ttl pk-yrs)     Types: Cigarettes     Start date: 1985     Quit date: 2015     Years since quittin.4     Passive exposure: Never    Smokeless tobacco: Never   Substance Use Topics    Alcohol use: Yes     Comment: little, sometimes on the weekends.       Health Maintenance Due   Topic Date Due    ZOSTER VACCINE (1 of 2) Never done    ANNUAL PHYSICAL  2024        Immunization History   Administered Date(s) Administered    COVID-19 (PFIZER) Purple Cap Monovalent 2021, 2021, 2021    Pneumococcal Conjugate 20-Valent (PCV20) 2023    Pneumococcal Polysaccharide (PPSV23) 2017    Tdap 2020       Allergies   Allergen Reactions    Latex Irritability        Medications:    Current Outpatient Medications:     albuterol sulfate  (90 Base) MCG/ACT inhaler, Inhale 2 puffs Every 6 (Six) Hours As Needed for Wheezing., Disp: 18 g, Rfl: 1    amLODIPine (NORVASC) 10 MG tablet, Take 1 tablet by mouth every night at bedtime., Disp: 90 tablet, Rfl: 3    aspirin 81 MG EC tablet, Take 1 tablet by mouth Daily., Disp: , Rfl:     atorvastatin (LIPITOR) 40 MG tablet, Take 1 tablet by mouth Daily., Disp: 90 tablet, Rfl: 3    Blood Glucose Monitoring Suppl (Accu-Chek Candace Plus) w/Device kit, Check blood sugar once daily, Disp: 1 kit, Rfl: 3    cetirizine (zyrTEC) 10 MG tablet, Take 1 tablet by mouth Daily As Needed., Disp: , Rfl:     Diclofenac Sodium (VOLTAREN) 1 % gel gel, Apply  topically to the appropriate area as directed., Disp: , Rfl:     Dulaglutide (Trulicity) 4.5 MG/0.5ML solution auto-injector, Inject 4.5mg under the skin into the appropriate area as directed 1 (One) Time Per Week., Disp: 6 mL, Rfl: 3    empagliflozin (JARDIANCE) 25 MG tablet tablet, Take 1 tablet by mouth Daily., Disp: 90 tablet, Rfl: 3    ferrous sulfate 325 (65 FE) MG tablet, Take 1 tablet by mouth Daily With Breakfast., Disp: , Rfl:     fluticasone (FLONASE) 50 MCG/ACT nasal spray, Use 1 spray  into each nostril as directed by provider Daily., Disp: 48 g, Rfl: 3    furosemide (Lasix) 40 MG tablet, Take 1 tablet by mouth Daily., Disp: 90 tablet, Rfl: 3    gabapentin (NEURONTIN) 100 MG capsule, Take 1 capsule by mouth Daily., Disp: , Rfl:     glucose blood (Accu-Chek Candace Plus) test strip, Check blood sugar once daily., Disp: 100 each, Rfl: 3    HYDROcodone-acetaminophen (NORCO) 5-325 MG per tablet, Take 1 tablet by mouth Every 6 (Six) Hours As Needed for Moderate Pain., Disp: 20 tablet, Rfl: 0    loratadine (Claritin) 10 MG tablet, Take 1 tablet by mouth Daily., Disp: 90 tablet, Rfl: 3    losartan (COZAAR) 100 MG tablet, Take 1 tablet by mouth Daily., Disp: 90 tablet, Rfl: 3    metFORMIN (GLUCOPHAGE) 1000 MG tablet, Take 1 tablet by mouth 2 (Two) Times a Day With Meals., Disp: 180 tablet, Rfl: 3    montelukast (Singulair) 10 MG tablet, Take 1 tablet by mouth Every Night., Disp: 30 tablet, Rfl: 1    neomycin-polymyxin-dexamethasone (MAXITROL) 3.5-27382-0.1 ophthalmic suspension, Administer 1 drop to both eyes 4 (Four) Times a Day., Disp: 5 mL, Rfl: 0    Olopatadine HCl 0.7 % solution, Apply  to eye(s) as directed by provider., Disp: , Rfl:     omeprazole (priLOSEC) 40 MG capsule, Take 1 capsule by mouth Daily., Disp: 90 capsule, Rfl: 3    Polyvinyl Alcohol-Povidone PF (ARTIFICIAL TEARS) 1.4-0.6 % ophthalmic solution, Apply  to eye(s) as directed by provider., Disp: , Rfl:     potassium chloride (MICRO-K) 10 MEQ CR capsule, Take 1 capsule by mouth Daily., Disp: 90 capsule, Rfl: 3    sildenafil (VIAGRA) 100 MG tablet, Take 0.5 to 1 tablet approximately 1 hour prior to sex as needed, Disp: 30 tablet, Rfl: 2    meloxicam (MOBIC) 15 MG tablet, Take 1 tablet by mouth Daily with food., Disp: 30 tablet, Rfl: 1    predniSONE (DELTASONE) 20 MG tablet, Take 2 tablets by mouth Daily for 4 days, THEN 1 tablet Daily for 4 days, THEN 1/2 tablet Daily for 4 days. (Patient not taking: Reported on 12/13/2024), Disp: 14  "tablet, Rfl: 0    Vital Signs:   Vitals:    12/13/24 1118 12/13/24 1156   BP: 140/68 121/71   BP Location: Left arm Left arm   Patient Position: Sitting Sitting   Pulse: 80 81   Temp: 98 °F (36.7 °C)    TempSrc: Oral    SpO2: 97%    Weight: (!) 139 kg (307 lb 3.2 oz)    Height: 182.9 cm (72.01\")    Body mass index is 41.65 kg/m².    Physical Exam:  Physical Exam  Vitals reviewed.   Constitutional:       General: He is not in acute distress.     Appearance: He is not ill-appearing.   Eyes:      Pupils: Pupils are equal, round, and reactive to light.   Cardiovascular:      Rate and Rhythm: Normal rate.   Pulmonary:      Effort: Pulmonary effort is normal.   Musculoskeletal:         General: Tenderness (There is still some tenderness noted of the left lateral ankle and foot) present.   Skin:     Findings: No lesion or rash.   Neurological:      Mental Status: He is alert.     Result Review   The following data was reviewed by Scooby Chun MD on 12/13/2024.  Lab Results   Component Value Date    WBC 10.87 (H) 12/03/2024    HGB 13.9 12/03/2024    HCT 44.7 12/03/2024    MCV 76.8 (L) 12/03/2024     12/03/2024     Lab Results   Component Value Date    GLUCOSE 120 (H) 12/03/2024    BUN 14 12/03/2024    CREATININE 0.98 12/03/2024     12/03/2024    K 4.1 12/03/2024     12/03/2024    CALCIUM 9.9 12/03/2024    PROTEINTOT 7.4 11/28/2023    ALBUMIN 4.1 11/28/2023    ALT 22 08/05/2024    AST 17 02/02/2024    ALKPHOS 107 11/28/2023    BILITOT 0.4 11/28/2023    GLOB 3.3 11/28/2023    AGRATIO 1.2 11/28/2023    BCR 14.3 12/03/2024    ANIONGAP 9.8 12/03/2024    EGFR 86.1 12/03/2024     Lab Results   Component Value Date    CHOL 123 06/13/2024    CHLPL 117 08/05/2024    TRIG 91 08/05/2024    HDL 44 08/05/2024    LDL 49 06/13/2024     Lab Results   Component Value Date    TSH 1.680 11/28/2023     Lab Results   Component Value Date    HGBA1C 7.3 (A) 09/17/2024     Lab Results   Component Value Date    PSA 0.623 " 06/13/2024    PSA 0.528 02/02/2024    PSA 0.510 05/30/2023          Assessment and Plan:   Today, we have reviewed his care.  He has had significant improvement during the course of the prednisone taper.  He does have residual inflammation and pain with the left ankle laterally.  I would lean against ongoing use of prednisone at this time.  We will give him a trial of meloxicam for daily use for the near term.  Hopefully, he will see the ankle gradually get back to more normal function.  If he does not, then it would be a question of whether to have him see orthopedics for second opinion.  We discussed potential risks with the use of meloxicam including ulcer, kidney damage, heart and stroke risks.  I think a limited use of this medication is reasonable though.  I did ask him to NOT take ibuprofen or Aleve while on this.    Diagnoses and all orders for this visit:    1. Acute left ankle pain (Primary)  -     meloxicam (MOBIC) 15 MG tablet; Take 1 tablet by mouth Daily with food.  Dispense: 30 tablet; Refill: 1    Follow Up  Return if symptoms worsen or fail to improve.  Patient was given instructions and counseling regarding his condition or for health maintenance advice. Please see specific information pulled into the AVS if appropriate.

## 2024-12-16 DIAGNOSIS — M25.572 ACUTE LEFT ANKLE PAIN: Primary | ICD-10-CM

## 2025-01-02 ENCOUNTER — TELEPHONE (OUTPATIENT)
Dept: FAMILY MEDICINE CLINIC | Age: 65
End: 2025-01-02
Payer: COMMERCIAL

## 2025-01-02 NOTE — TELEPHONE ENCOUNTER
----- Message from Karine JUAREZ sent at 12/20/2024  2:48 PM EST -----  TICKLE to call him after 01/02/2025 to see how he is doing with the ankle.  Is his pain continuing to improve?  Should we consider referral to orthopedics or other specialist?  Thanks.

## 2025-01-02 NOTE — TELEPHONE ENCOUNTER
Pt states he is still having some pain and swelling, has an appointment with the podiatrist 1/9/25 regarding ankle, ortho would not see him.

## 2025-01-03 NOTE — TELEPHONE ENCOUNTER
Noted.  I would recommend he continue meloxicam until he sees podiatry next week.  Let me know if he is asking for any specific change in care or has other concerns.  Thanks.

## 2025-01-29 ENCOUNTER — OFFICE VISIT (OUTPATIENT)
Dept: FAMILY MEDICINE CLINIC | Age: 65
End: 2025-01-29
Payer: COMMERCIAL

## 2025-01-29 VITALS
HEART RATE: 100 BPM | DIASTOLIC BLOOD PRESSURE: 79 MMHG | WEIGHT: 300 LBS | TEMPERATURE: 98.2 F | BODY MASS INDEX: 40.63 KG/M2 | OXYGEN SATURATION: 97 % | HEIGHT: 72 IN | SYSTOLIC BLOOD PRESSURE: 141 MMHG

## 2025-01-29 DIAGNOSIS — J02.0 STREP PHARYNGITIS: Primary | ICD-10-CM

## 2025-01-29 DIAGNOSIS — R09.81 NASAL CONGESTION: ICD-10-CM

## 2025-01-29 LAB
EXPIRATION DATE: ABNORMAL
EXPIRATION DATE: NORMAL
FLUAV AG UPPER RESP QL IA.RAPID: NOT DETECTED
FLUBV AG UPPER RESP QL IA.RAPID: NOT DETECTED
INTERNAL CONTROL: ABNORMAL
INTERNAL CONTROL: NORMAL
Lab: ABNORMAL
Lab: NORMAL
S PYO AG THROAT QL: POSITIVE
SARS-COV-2 AG UPPER RESP QL IA.RAPID: NOT DETECTED

## 2025-01-29 PROCEDURE — 87880 STREP A ASSAY W/OPTIC: CPT | Performed by: NURSE PRACTITIONER

## 2025-01-29 PROCEDURE — 87428 SARSCOV & INF VIR A&B AG IA: CPT | Performed by: NURSE PRACTITIONER

## 2025-01-29 PROCEDURE — 99213 OFFICE O/P EST LOW 20 MIN: CPT | Performed by: NURSE PRACTITIONER

## 2025-01-29 NOTE — PROGRESS NOTES
Chief Complaint  Headache (X4 days), Sore Throat (X4 days), Fatigue, Insomnia, Nasal Congestion (X4 days), and Cough    Subjective        Cristino Johns presents to [facility] today for headache, sore throat, fatigue, nasal congestion , cough x 4 days, exposed to granddaughter with strep and flu, taking obed seltzer plus and mucinex without much improvement.       Current Outpatient Medications:     albuterol sulfate  (90 Base) MCG/ACT inhaler, Inhale 2 puffs Every 6 (Six) Hours As Needed for Wheezing., Disp: 18 g, Rfl: 1    amLODIPine (NORVASC) 10 MG tablet, Take 1 tablet by mouth every night at bedtime., Disp: 90 tablet, Rfl: 3    aspirin 81 MG EC tablet, Take 1 tablet by mouth Daily., Disp: , Rfl:     atorvastatin (LIPITOR) 40 MG tablet, Take 1 tablet by mouth Daily., Disp: 90 tablet, Rfl: 3    Blood Glucose Monitoring Suppl (Accu-Chek Candace Plus) w/Device kit, Check blood sugar once daily, Disp: 1 kit, Rfl: 3    cetirizine (zyrTEC) 10 MG tablet, Take 1 tablet by mouth Daily As Needed., Disp: , Rfl:     Diclofenac Sodium (VOLTAREN) 1 % gel gel, Apply  topically to the appropriate area as directed., Disp: , Rfl:     Dulaglutide (Trulicity) 4.5 MG/0.5ML solution auto-injector, Inject 4.5mg under the skin into the appropriate area as directed 1 (One) Time Per Week., Disp: 6 mL, Rfl: 3    empagliflozin (JARDIANCE) 25 MG tablet tablet, Take 1 tablet by mouth Daily., Disp: 90 tablet, Rfl: 3    ferrous sulfate 325 (65 FE) MG tablet, Take 1 tablet by mouth Daily With Breakfast., Disp: , Rfl:     fluticasone (FLONASE) 50 MCG/ACT nasal spray, Use 1 spray into each nostril as directed by provider Daily., Disp: 48 g, Rfl: 3    furosemide (Lasix) 40 MG tablet, Take 1 tablet by mouth Daily., Disp: 90 tablet, Rfl: 3    gabapentin (NEURONTIN) 100 MG capsule, Take 1 capsule by mouth Daily., Disp: , Rfl:     glucose blood (Accu-Chek Candace Plus) test strip, Check blood sugar once daily.,  "Disp: 100 each, Rfl: 3    HYDROcodone-acetaminophen (NORCO) 5-325 MG per tablet, Take 1 tablet by mouth Every 6 (Six) Hours As Needed for Moderate Pain., Disp: 20 tablet, Rfl: 0    loratadine (Claritin) 10 MG tablet, Take 1 tablet by mouth Daily., Disp: 90 tablet, Rfl: 3    losartan (COZAAR) 100 MG tablet, Take 1 tablet by mouth Daily., Disp: 90 tablet, Rfl: 3    meloxicam (MOBIC) 15 MG tablet, Take 1 tablet by mouth Daily with food., Disp: 30 tablet, Rfl: 1    metFORMIN (GLUCOPHAGE) 1000 MG tablet, Take 1 tablet by mouth 2 (Two) Times a Day With Meals., Disp: 180 tablet, Rfl: 3    montelukast (Singulair) 10 MG tablet, Take 1 tablet by mouth Every Night., Disp: 30 tablet, Rfl: 1    neomycin-polymyxin-dexamethasone (MAXITROL) 3.5-27373-5.1 ophthalmic suspension, Administer 1 drop to both eyes 4 (Four) Times a Day., Disp: 5 mL, Rfl: 0    Olopatadine HCl 0.7 % solution, Apply  to eye(s) as directed by provider., Disp: , Rfl:     omeprazole (priLOSEC) 40 MG capsule, Take 1 capsule by mouth Daily., Disp: 90 capsule, Rfl: 3    Polyvinyl Alcohol-Povidone PF (ARTIFICIAL TEARS) 1.4-0.6 % ophthalmic solution, Apply  to eye(s) as directed by provider., Disp: , Rfl:     potassium chloride (MICRO-K) 10 MEQ CR capsule, Take 1 capsule by mouth Daily., Disp: 90 capsule, Rfl: 3    sildenafil (VIAGRA) 100 MG tablet, Take 0.5 to 1 tablet approximately 1 hour prior to sex as needed, Disp: 30 tablet, Rfl: 2    amoxicillin-clavulanate (AUGMENTIN) 875-125 MG per tablet, Take 1 tablet by mouth 2 (Two) Times a Day for 7 days., Disp: 14 tablet, Rfl: 0  There are no discontinued medications.      Allergies:  Latex      Objective   Vital Signs:   Vitals:    01/29/25 1417   BP: 141/79   BP Location: Left arm   Patient Position: Sitting   Cuff Size: Large Adult   Pulse: 100   Temp: 98.2 °F (36.8 °C)   TempSrc: Oral   SpO2: 97%   Weight: 136 kg (300 lb)   Height: 182.9 cm (72.01\")     Body mass index is 40.68 kg/m².           Physical " Exam  Constitutional:       Appearance: Normal appearance.   HENT:      Right Ear: Tympanic membrane normal.      Left Ear: Tympanic membrane normal.      Nose: Congestion present.      Mouth/Throat:      Pharynx: Posterior oropharyngeal erythema present. No oropharyngeal exudate.   Neck:      Vascular: No carotid bruit.   Cardiovascular:      Rate and Rhythm: Normal rate and regular rhythm.      Heart sounds: Normal heart sounds.   Pulmonary:      Effort: Pulmonary effort is normal.      Breath sounds: Normal breath sounds.   Musculoskeletal:         General: Normal range of motion.   Skin:     General: Skin is warm and dry.   Neurological:      General: No focal deficit present.      Mental Status: He is alert.   Psychiatric:         Mood and Affect: Mood normal.         Behavior: Behavior normal.             Lab Results   Component Value Date    GLUCOSE 120 (H) 12/03/2024    BUN 14 12/03/2024    CREATININE 0.98 12/03/2024    EGFRIFAFRI 94 11/23/2021    BCR 14.3 12/03/2024    K 4.1 12/03/2024    CO2 26.2 12/03/2024    CALCIUM 9.9 12/03/2024    ALBUMIN 4.1 11/28/2023    AST 17 02/02/2024    ALT 22 08/05/2024       Lab Results   Component Value Date    CHOL 123 06/13/2024    TRIG 91 08/05/2024    HDL 44 08/05/2024    LDL 49 06/13/2024       Lab Results   Component Value Date    WBC 10.87 (H) 12/03/2024    HGB 13.9 12/03/2024    HCT 44.7 12/03/2024    MCV 76.8 (L) 12/03/2024     12/03/2024       Lab Results (last 24 hours)       Procedure Component Value Units Date/Time    POCT rapid strep A [[id number]]  (Abnormal) Collected: 01/29/25 1433    Specimen: Swab Updated: 01/29/25 1433     Rapid Strep A Screen Positive     Internal Control Passed     Lot Number [id number]     Expiration Date 11-30-25    POCT SARS-CoV-2 Antigen JESUS + Flu [[id number]] Collected: 01/29/25 1442    Specimen: Swab Updated: 01/29/25 1442     SARS Antigen Not Detected     Influenza A Antigen JESUS Not Detected     Influenza B Antigen JESUS Not  Detected     Internal Control Passed     Lot Number [id number]     Expiration Date 7-30-25              Procedures         Diagnoses and all orders for this visit:    1. Strep pharyngitis (Primary)  -     POCT rapid strep A  -     amoxicillin-clavulanate (AUGMENTIN) 875-125 MG per tablet; Take 1 tablet by mouth 2 (Two) Times a Day for 7 days.  Dispense: 14 tablet; Refill: 0    2. Nasal congestion  -     POCT SARS-CoV-2 Antigen JESUS + Flu            Follow Up  Return if symptoms worsen or fail to improve, for If not improving or symptoms are getting worse.  Patient was given instructions and counseling regarding his condition or for health maintenance advice. Please see specific information pulled into the AVS if appropriate.           Felipe Wagoner, APRN  01/29/2025    Please note that portions of this document were completed using a voice recognition program.

## 2025-04-14 ENCOUNTER — TELEPHONE (OUTPATIENT)
Dept: FAMILY MEDICINE CLINIC | Age: 65
End: 2025-04-14
Payer: COMMERCIAL

## 2025-04-17 DIAGNOSIS — E66.01 MORBID OBESITY: ICD-10-CM

## 2025-04-17 DIAGNOSIS — E11.42 TYPE 2 DIABETES MELLITUS WITH DIABETIC POLYNEUROPATHY, WITHOUT LONG-TERM CURRENT USE OF INSULIN: ICD-10-CM

## 2025-04-17 RX ORDER — DULAGLUTIDE 4.5 MG/.5ML
4.5 INJECTION, SOLUTION SUBCUTANEOUS WEEKLY
Qty: 6 ML | Refills: 3 | Status: CANCELLED | OUTPATIENT
Start: 2025-04-17

## 2025-04-17 RX ORDER — DULAGLUTIDE 4.5 MG/.5ML
4.5 INJECTION, SOLUTION SUBCUTANEOUS WEEKLY
Qty: 6 ML | Refills: 1 | Status: SHIPPED | OUTPATIENT
Start: 2025-04-17

## 2025-04-17 NOTE — TELEPHONE ENCOUNTER
I have sent refills on Trulicity 4.5 mg weekly.  He will be due for follow-up around 6/13/2025.  Please schedule.  Thanks.

## 2025-06-16 ENCOUNTER — RESULTS FOLLOW-UP (OUTPATIENT)
Dept: FAMILY MEDICINE CLINIC | Age: 65
End: 2025-06-16
Payer: COMMERCIAL

## 2025-06-16 NOTE — LETTER
Cristino Johns  [address]  [place] [address]    June 16, 2025     Good afternoon, Cristino.  I hope you are well.  The low-dose CT of the chest in March looked good.  There was no evidence for lung cancer.  We will plan to review your chart again in mid March to confirm that [facility] will be repeating this.  Let me know if you have other concerns.     Scooby Chun MD  [facility]

## 2025-06-18 ENCOUNTER — OFFICE VISIT (OUTPATIENT)
Dept: FAMILY MEDICINE CLINIC | Age: 65
End: 2025-06-18
Payer: COMMERCIAL

## 2025-06-18 VITALS
DIASTOLIC BLOOD PRESSURE: 76 MMHG | HEART RATE: 92 BPM | SYSTOLIC BLOOD PRESSURE: 133 MMHG | HEIGHT: 72 IN | BODY MASS INDEX: 41.04 KG/M2 | OXYGEN SATURATION: 97 % | WEIGHT: 303 LBS | TEMPERATURE: 97.8 F

## 2025-06-18 DIAGNOSIS — K59.00 CONSTIPATION, UNSPECIFIED CONSTIPATION TYPE: Primary | ICD-10-CM

## 2025-06-18 DIAGNOSIS — K21.9 GASTROESOPHAGEAL REFLUX DISEASE WITHOUT ESOPHAGITIS: ICD-10-CM

## 2025-06-18 RX ORDER — METOCLOPRAMIDE 5 MG/1
5 TABLET ORAL
Qty: 120 TABLET | Refills: 1 | Status: SHIPPED | OUTPATIENT
Start: 2025-06-18

## 2025-06-18 RX ORDER — POLYETHYLENE GLYCOL 3350 17 G/17G
17 POWDER, FOR SOLUTION ORAL DAILY
Qty: 510 G | Refills: 1 | Status: SHIPPED | OUTPATIENT
Start: 2025-06-18

## 2025-06-18 RX ORDER — DOCUSATE SODIUM 100 MG/1
100 CAPSULE, LIQUID FILLED ORAL 2 TIMES DAILY
Qty: 60 CAPSULE | Refills: 1 | Status: SHIPPED | OUTPATIENT
Start: 2025-06-18

## 2025-06-18 RX ORDER — PANTOPRAZOLE SODIUM 40 MG/1
40 TABLET, DELAYED RELEASE ORAL DAILY
Qty: 30 TABLET | Refills: 1 | Status: SHIPPED | OUTPATIENT
Start: 2025-06-18

## 2025-06-18 NOTE — ASSESSMENT & PLAN NOTE
Orders:    pantoprazole (PROTONIX) 40 MG EC tablet; Take 1 tablet by mouth Daily.    Ambulatory Referral to Gastroenterology

## 2025-06-18 NOTE — PROGRESS NOTES
Chief Complaint     Abdominal Pain (Upper abdominal discomfort after eating X 1 month /Referral to gastro ), Constipation, and Gas (Excessive gas & Sulphur burps )    History of Present Illness     Cristino Johns is a 64 y.o. male who presents to [facility].     Patient or patient representative verbalized consent for the use of Ambient Listening during the visit with  SHEFALI Ryan for chart documentation. 6/18/2025  13:04 EDT      History of Present Illness  The patient is a 64-year-old male who presents for evaluation of epigastric pain and constipation.    He reports experiencing epigastric pain, belching, and constipation, which have been progressively worsening over the past month. His last bowel movement was approximately 3 days ago. He maintains a high-water intake. Despite daily use of Prilosec, he has not found relief from his symptoms. He has attempted to seek consultation with a gastroenterologist but was informed that a referral is necessary.    He was diagnosed with diabetes approximately 10 years ago.         History      Past Medical History:   Diagnosis Date    Diabetes     GERD (gastroesophageal reflux disease)     History of COVID-19     fall of 2020    Hyperlipidemia     Hypertension     Polyp, vocal cord     Spinal headache     Urinary frequency        Past Surgical History:   Procedure Laterality Date    COLONOSCOPY      COLONOSCOPY N/A 10/29/2021    Tubular adenoma    ENDOSCOPY N/A 10/29/2021    Procedure: ESOPHAGOGASTRODUODENOSCOPY with biopsy;  Surgeon: Vincenzo King MD;  Location: [facility] ENDOSCOPY;  Service: Gastroenterology;  Laterality: N/A;  small hiatal hernia    ESOPHAGUS SURGERY      aorta wrapped around esophagus    HERNIA REPAIR      UPPER GASTROINTESTINAL ENDOSCOPY      VASCULAR SURGERY         Family History   Problem Relation Age of Onset    Hypertension Mother     Cancer Mother     Diabetes Mother     Malig Hyperthermia Neg Hx      Colon cancer Neg Hx         Current Medications        Current Outpatient Medications:     albuterol sulfate  (90 Base) MCG/ACT inhaler, Inhale 2 puffs Every 6 (Six) Hours As Needed for Wheezing., Disp: 18 g, Rfl: 1    amLODIPine (NORVASC) 10 MG tablet, Take 1 tablet by mouth every night at bedtime., Disp: 90 tablet, Rfl: 3    aspirin 81 MG EC tablet, Take 1 tablet by mouth Daily., Disp: , Rfl:     atorvastatin (LIPITOR) 40 MG tablet, Take 1 tablet by mouth Daily., Disp: 90 tablet, Rfl: 3    Blood Glucose Monitoring Suppl (Accu-Chek Candace Plus) w/Device kit, Check blood sugar once daily, Disp: 1 kit, Rfl: 3    cetirizine (zyrTEC) 10 MG tablet, Take 1 tablet by mouth Daily As Needed., Disp: , Rfl:     Diclofenac Sodium (VOLTAREN) 1 % gel gel, Apply  topically to the appropriate area as directed., Disp: , Rfl:     Dulaglutide (Trulicity) 4.5 MG/0.5ML solution auto-injector, Inject 4.5 mg under the skin into the appropriate area as directed 1 (One) Time Per Week., Disp: 6 mL, Rfl: 1    empagliflozin (JARDIANCE) 25 MG tablet tablet, Take 1 tablet by mouth Daily., Disp: 90 tablet, Rfl: 3    ferrous sulfate 325 (65 FE) MG tablet, Take 1 tablet by mouth Daily With Breakfast., Disp: , Rfl:     fluticasone (FLONASE) 50 MCG/ACT nasal spray, Use 1 spray into each nostril as directed by provider Daily., Disp: 48 g, Rfl: 3    furosemide (Lasix) 40 MG tablet, Take 1 tablet by mouth Daily., Disp: 90 tablet, Rfl: 3    gabapentin (NEURONTIN) 100 MG capsule, Take 1 capsule by mouth Daily., Disp: , Rfl:     glucose blood (Accu-Chek Candace Plus) test strip, Check blood sugar once daily., Disp: 100 each, Rfl: 3    HYDROcodone-acetaminophen (NORCO) 5-325 MG per tablet, Take 1 tablet by mouth Every 6 (Six) Hours As Needed for Moderate Pain., Disp: 20 tablet, Rfl: 0    loratadine (Claritin) 10 MG tablet, Take 1 tablet by mouth Daily., Disp: 90 tablet, Rfl: 3    losartan (COZAAR) 100 MG tablet, Take 1 tablet by mouth Daily., Disp: 90  tablet, Rfl: 3    meloxicam (MOBIC) 15 MG tablet, Take 1 tablet by mouth Daily with food., Disp: 30 tablet, Rfl: 1    metFORMIN (GLUCOPHAGE) 1000 MG tablet, Take 1 tablet by mouth 2 (Two) Times a Day With Meals., Disp: 180 tablet, Rfl: 3    montelukast (Singulair) 10 MG tablet, Take 1 tablet by mouth Every Night., Disp: 30 tablet, Rfl: 1    neomycin-polymyxin-dexamethasone (MAXITROL) 3.5-48793-6.1 ophthalmic suspension, Administer 1 drop to both eyes 4 (Four) Times a Day., Disp: 5 mL, Rfl: 0    Olopatadine HCl 0.7 % solution, Apply  to eye(s) as directed by provider., Disp: , Rfl:     omeprazole (priLOSEC) 40 MG capsule, Take 1 capsule by mouth Daily., Disp: 90 capsule, Rfl: 3    Polyvinyl Alcohol-Povidone PF (ARTIFICIAL TEARS) 1.4-0.6 % ophthalmic solution, Apply  to eye(s) as directed by provider., Disp: , Rfl:     potassium chloride (MICRO-K) 10 MEQ CR capsule, Take 1 capsule by mouth Daily., Disp: 90 capsule, Rfl: 3    docusate sodium (Colace) 100 MG capsule, Take 1 capsule by mouth 2 (Two) Times a Day., Disp: 60 capsule, Rfl: 1    metoclopramide (Reglan) 5 MG tablet, Take 1 tablet by mouth 4 (Four) Times a Day Before Meals & at Bedtime., Disp: 120 tablet, Rfl: 1    pantoprazole (PROTONIX) 40 MG EC tablet, Take 1 tablet by mouth Daily., Disp: 30 tablet, Rfl: 1    polyethylene glycol (MiraLax) 17 GM/SCOOP powder, Mix 17g in 8 ounces of liquid and drink once daily as needed for constipation. If no BM for more than 5 days use 2 times daily until BM, Disp: 510 g, Rfl: 1    sildenafil (VIAGRA) 100 MG tablet, Take 0.5 to 1 tablet approximately 1 hour prior to sex as needed (Patient not taking: Reported on 6/18/2025), Disp: 30 tablet, Rfl: 2     Allergies     Allergies   Allergen Reactions    Latex Irritability       Social History       Social History     Social History Narrative    Not on file       Immunizations     Immunization:  Immunization History   Administered Date(s) Administered    COVID-19 (PFIZER) Purple  "Cap Monovalent 04/01/2021, 04/22/2021, 12/16/2021    Pneumococcal Conjugate 20-Valent (PCV20) 01/24/2023    Pneumococcal Polysaccharide (PPSV23) 03/16/2017    Tdap 05/07/2020          Objective     Objective     Vital Signs:   /76 (BP Location: Right arm, Patient Position: Sitting, Cuff Size: Adult)   Pulse 92   Temp 97.8 °F (36.6 °C) (Oral)   Ht 182.9 cm (72.01\")   Wt (!) 137 kg (303 lb)   SpO2 97%   BMI 41.08 kg/m²       Physical Exam  Vitals and nursing note reviewed.   Constitutional:       Appearance: Normal appearance. He is obese.   HENT:      Head: Normocephalic.   Eyes:      Conjunctiva/sclera: Conjunctivae normal.      Pupils: Pupils are equal, round, and reactive to light.   Cardiovascular:      Rate and Rhythm: Normal rate and regular rhythm.      Pulses: Normal pulses.      Heart sounds: Normal heart sounds.   Pulmonary:      Effort: Pulmonary effort is normal.      Breath sounds: Normal breath sounds.   Abdominal:      General: Abdomen is protuberant. Bowel sounds are normal.      Palpations: Abdomen is soft.      Tenderness: There is abdominal tenderness in the epigastric area.   Musculoskeletal:         General: Normal range of motion.      Cervical back: Normal range of motion and neck supple.   Skin:     General: Skin is warm and dry.   Neurological:      General: No focal deficit present.      Mental Status: He is alert and oriented to person, place, and time.   Psychiatric:         Attention and Perception: Attention normal.         Mood and Affect: Mood and affect normal.         Behavior: Behavior normal. Behavior is cooperative.         Physical Exam        Results    The following data was reviewed by: SHEFALI Ryan on 06/18/25               Results           Assessment and Plan        Assessment and Plan       Constipation, unspecified constipation type    Orders:    Ambulatory Referral to Gastroenterology    docusate sodium (Colace) 100 MG capsule; Take 1 capsule by mouth " 2 (Two) Times a Day.    polyethylene glycol (MiraLax) 17 GM/SCOOP powder; Mix 17g in 8 ounces of liquid and drink once daily as needed for constipation. If no BM for more than 5 days use 2 times daily until BM    metoclopramide (Reglan) 5 MG tablet; Take 1 tablet by mouth 4 (Four) Times a Day Before Meals & at Bedtime.    Gastroesophageal reflux disease without esophagitis    Orders:    pantoprazole (PROTONIX) 40 MG EC tablet; Take 1 tablet by mouth Daily.    Ambulatory Referral to Gastroenterology         Assessment & Plan  1. Epigastric pain.  - Reports epigastric pain, belching, and worsening symptoms over the past month.  - Discussed potential gastroparesis, common in diabetics.  - Switched from omeprazole to pantoprazole; prescribed Reglan (metoclopramide) 5 mg four times daily before meals and at bedtime; referral to gastroenterologist initiated; advised to maintain adequate hydration.    2. Constipation.  - Reports significant constipation with the last bowel movement occurring three days ago.  - Discussed management of constipation.  - Prescribed docusate 100 mg twice daily; recommended MiraLAX (polyethylene glycol) as needed if no bowel movement occurs within three days; increase MiraLAX to twice daily if no bowel movement within five days.    3. Diabetes Mellitus.  - Diabetic for approximately 10 years.  - Discussed potential gastroparesis as a complication of diabetes.  - Advised to continue managing diabetes and monitor symptoms closely.        Follow Up        Follow Up   Return for With PCP, Next scheduled follow up, sooner if condition worsens.  Patient was given instructions and counseling regarding his condition or for health maintenance advice. Please see specific information pulled into the AVS if appropriate.

## 2025-07-24 DIAGNOSIS — E11.42 TYPE 2 DIABETES MELLITUS WITH DIABETIC POLYNEUROPATHY, WITHOUT LONG-TERM CURRENT USE OF INSULIN: ICD-10-CM

## 2025-07-30 DIAGNOSIS — E11.42 TYPE 2 DIABETES MELLITUS WITH DIABETIC POLYNEUROPATHY, WITHOUT LONG-TERM CURRENT USE OF INSULIN: ICD-10-CM

## 2025-07-30 DIAGNOSIS — R60.0 PEDAL EDEMA: ICD-10-CM

## 2025-07-30 DIAGNOSIS — E78.2 MIXED HYPERLIPIDEMIA: ICD-10-CM

## 2025-07-30 DIAGNOSIS — K21.9 GASTROESOPHAGEAL REFLUX DISEASE, UNSPECIFIED WHETHER ESOPHAGITIS PRESENT: ICD-10-CM

## 2025-07-30 DIAGNOSIS — I10 ESSENTIAL HYPERTENSION: ICD-10-CM

## 2025-07-30 RX ORDER — AMLODIPINE BESYLATE 10 MG/1
10 TABLET ORAL
Qty: 90 TABLET | Refills: 3 | Status: CANCELLED | OUTPATIENT
Start: 2025-07-30

## 2025-07-30 RX ORDER — FUROSEMIDE 40 MG/1
40 TABLET ORAL DAILY
Qty: 90 TABLET | Refills: 3 | Status: CANCELLED | OUTPATIENT
Start: 2025-07-30

## 2025-07-30 RX ORDER — POTASSIUM CHLORIDE 750 MG/1
10 CAPSULE, EXTENDED RELEASE ORAL DAILY
Qty: 90 CAPSULE | Refills: 3 | Status: CANCELLED | OUTPATIENT
Start: 2025-07-30

## 2025-07-30 RX ORDER — LOSARTAN POTASSIUM 100 MG/1
100 TABLET ORAL DAILY
Qty: 90 TABLET | Refills: 3 | Status: CANCELLED | OUTPATIENT
Start: 2025-07-30

## 2025-07-30 RX ORDER — OMEPRAZOLE 40 MG/1
40 CAPSULE, DELAYED RELEASE ORAL DAILY
Qty: 90 CAPSULE | Refills: 3 | Status: CANCELLED | OUTPATIENT
Start: 2025-07-30

## 2025-07-30 RX ORDER — ATORVASTATIN CALCIUM 40 MG/1
40 TABLET, FILM COATED ORAL DAILY
Qty: 90 TABLET | Refills: 3 | Status: CANCELLED | OUTPATIENT
Start: 2025-07-30

## 2025-08-03 DIAGNOSIS — E11.42 TYPE 2 DIABETES MELLITUS WITH DIABETIC POLYNEUROPATHY, WITHOUT LONG-TERM CURRENT USE OF INSULIN: ICD-10-CM

## 2025-08-03 DIAGNOSIS — I10 ESSENTIAL HYPERTENSION: ICD-10-CM

## 2025-08-03 DIAGNOSIS — R60.0 PEDAL EDEMA: ICD-10-CM

## 2025-08-03 DIAGNOSIS — E78.2 MIXED HYPERLIPIDEMIA: ICD-10-CM

## 2025-08-03 DIAGNOSIS — K21.9 GASTROESOPHAGEAL REFLUX DISEASE, UNSPECIFIED WHETHER ESOPHAGITIS PRESENT: ICD-10-CM

## 2025-08-03 RX ORDER — ATORVASTATIN CALCIUM 40 MG/1
40 TABLET, FILM COATED ORAL DAILY
Qty: 90 TABLET | Refills: 3 | Status: CANCELLED | OUTPATIENT
Start: 2025-08-03

## 2025-08-03 RX ORDER — POTASSIUM CHLORIDE 750 MG/1
10 CAPSULE, EXTENDED RELEASE ORAL DAILY
Qty: 90 CAPSULE | Refills: 3 | Status: CANCELLED | OUTPATIENT
Start: 2025-08-03

## 2025-08-03 RX ORDER — AMLODIPINE BESYLATE 10 MG/1
10 TABLET ORAL
Qty: 90 TABLET | Refills: 3 | Status: CANCELLED | OUTPATIENT
Start: 2025-08-03

## 2025-08-03 RX ORDER — LOSARTAN POTASSIUM 100 MG/1
100 TABLET ORAL DAILY
Qty: 90 TABLET | Refills: 3 | Status: CANCELLED | OUTPATIENT
Start: 2025-08-03

## 2025-08-03 RX ORDER — OMEPRAZOLE 40 MG/1
40 CAPSULE, DELAYED RELEASE ORAL DAILY
Qty: 90 CAPSULE | Refills: 3 | Status: CANCELLED | OUTPATIENT
Start: 2025-08-03

## 2025-08-03 RX ORDER — FUROSEMIDE 40 MG/1
40 TABLET ORAL DAILY
Qty: 90 TABLET | Refills: 3 | Status: CANCELLED | OUTPATIENT
Start: 2025-08-03

## 2025-08-04 ENCOUNTER — LAB (OUTPATIENT)
Dept: LAB | Facility: HOSPITAL | Age: 65
End: 2025-08-04
Payer: COMMERCIAL

## 2025-08-04 ENCOUNTER — OFFICE VISIT (OUTPATIENT)
Dept: FAMILY MEDICINE CLINIC | Age: 65
End: 2025-08-04
Payer: COMMERCIAL

## 2025-08-04 VITALS
DIASTOLIC BLOOD PRESSURE: 81 MMHG | WEIGHT: 307 LBS | SYSTOLIC BLOOD PRESSURE: 135 MMHG | OXYGEN SATURATION: 99 % | BODY MASS INDEX: 41.58 KG/M2 | HEART RATE: 76 BPM | HEIGHT: 72 IN | TEMPERATURE: 97.7 F

## 2025-08-04 DIAGNOSIS — Z91.09 ENVIRONMENTAL ALLERGIES: ICD-10-CM

## 2025-08-04 DIAGNOSIS — Z00.00 PHYSICAL EXAM: Primary | ICD-10-CM

## 2025-08-04 DIAGNOSIS — I10 ESSENTIAL HYPERTENSION: ICD-10-CM

## 2025-08-04 DIAGNOSIS — R10.13 EPIGASTRIC ABDOMINAL PAIN: ICD-10-CM

## 2025-08-04 DIAGNOSIS — E78.2 MIXED HYPERLIPIDEMIA: ICD-10-CM

## 2025-08-04 DIAGNOSIS — E11.42 TYPE 2 DIABETES MELLITUS WITH DIABETIC POLYNEUROPATHY, WITHOUT LONG-TERM CURRENT USE OF INSULIN: ICD-10-CM

## 2025-08-04 DIAGNOSIS — R60.0 PEDAL EDEMA: ICD-10-CM

## 2025-08-04 DIAGNOSIS — K21.9 GASTROESOPHAGEAL REFLUX DISEASE WITHOUT ESOPHAGITIS: ICD-10-CM

## 2025-08-04 DIAGNOSIS — E66.01 MORBID OBESITY: ICD-10-CM

## 2025-08-04 LAB — H. PYLORI ANTIGEN STOOL: NEGATIVE

## 2025-08-04 PROCEDURE — 87338 HPYLORI STOOL AG IA: CPT

## 2025-08-04 PROCEDURE — 99397 PER PM REEVAL EST PAT 65+ YR: CPT | Performed by: FAMILY MEDICINE

## 2025-08-04 RX ORDER — FUROSEMIDE 40 MG/1
40 TABLET ORAL DAILY
Qty: 90 TABLET | Refills: 3 | Status: SHIPPED | OUTPATIENT
Start: 2025-08-04

## 2025-08-04 RX ORDER — PANTOPRAZOLE SODIUM 40 MG/1
40 TABLET, DELAYED RELEASE ORAL DAILY
Qty: 90 TABLET | Refills: 3 | Status: SHIPPED | OUTPATIENT
Start: 2025-08-04

## 2025-08-04 RX ORDER — POTASSIUM CHLORIDE 750 MG/1
10 CAPSULE, EXTENDED RELEASE ORAL DAILY
Qty: 90 CAPSULE | Refills: 3 | Status: SHIPPED | OUTPATIENT
Start: 2025-08-04

## 2025-08-04 RX ORDER — ATORVASTATIN CALCIUM 40 MG/1
40 TABLET, FILM COATED ORAL DAILY
Qty: 90 TABLET | Refills: 3 | Status: SHIPPED | OUTPATIENT
Start: 2025-08-04

## 2025-08-04 RX ORDER — AMLODIPINE BESYLATE 10 MG/1
10 TABLET ORAL
Qty: 90 TABLET | Refills: 3 | Status: SHIPPED | OUTPATIENT
Start: 2025-08-04

## 2025-08-04 RX ORDER — LOSARTAN POTASSIUM 100 MG/1
100 TABLET ORAL DAILY
Qty: 90 TABLET | Refills: 3 | Status: SHIPPED | OUTPATIENT
Start: 2025-08-04

## 2025-08-04 RX ORDER — DULAGLUTIDE 4.5 MG/.5ML
4.5 INJECTION, SOLUTION SUBCUTANEOUS WEEKLY
Qty: 6 ML | Refills: 3 | Status: SHIPPED | OUTPATIENT
Start: 2025-08-04

## 2025-08-04 RX ORDER — FLUTICASONE PROPIONATE 50 MCG
1 SPRAY, SUSPENSION (ML) NASAL DAILY
Qty: 48 G | Refills: 3 | Status: SHIPPED | OUTPATIENT
Start: 2025-08-04

## 2025-08-05 ENCOUNTER — RESULTS FOLLOW-UP (OUTPATIENT)
Dept: FAMILY MEDICINE CLINIC | Age: 65
End: 2025-08-05
Payer: COMMERCIAL

## 2025-08-05 DIAGNOSIS — Z12.5 PROSTATE CANCER SCREENING: ICD-10-CM

## 2025-08-05 DIAGNOSIS — E78.2 MIXED HYPERLIPIDEMIA: ICD-10-CM

## 2025-08-05 DIAGNOSIS — K21.9 GASTROESOPHAGEAL REFLUX DISEASE WITHOUT ESOPHAGITIS: Primary | ICD-10-CM

## 2025-08-05 DIAGNOSIS — I10 ESSENTIAL HYPERTENSION: ICD-10-CM

## 2025-08-05 DIAGNOSIS — E11.42 TYPE 2 DIABETES MELLITUS WITH DIABETIC POLYNEUROPATHY, WITHOUT LONG-TERM CURRENT USE OF INSULIN: ICD-10-CM

## 2025-08-05 RX ORDER — SUCRALFATE 1 G/1
1 TABLET ORAL 3 TIMES DAILY
Qty: 90 TABLET | Refills: 0 | Status: SHIPPED | OUTPATIENT
Start: 2025-08-05

## 2025-08-12 ENCOUNTER — LAB (OUTPATIENT)
Dept: LAB | Facility: HOSPITAL | Age: 65
End: 2025-08-12
Payer: COMMERCIAL

## 2025-08-12 DIAGNOSIS — E11.42 TYPE 2 DIABETES MELLITUS WITH DIABETIC POLYNEUROPATHY, WITHOUT LONG-TERM CURRENT USE OF INSULIN: ICD-10-CM

## 2025-08-12 DIAGNOSIS — Z12.5 PROSTATE CANCER SCREENING: ICD-10-CM

## 2025-08-12 DIAGNOSIS — E78.2 MIXED HYPERLIPIDEMIA: ICD-10-CM

## 2025-08-12 DIAGNOSIS — I10 ESSENTIAL HYPERTENSION: ICD-10-CM

## 2025-08-12 LAB
ALBUMIN SERPL-MCNC: 4 G/DL (ref 3.5–5.2)
ALBUMIN UR-MCNC: 1.4 MG/DL
ALBUMIN/GLOB SERPL: 1.1 G/DL
ALP SERPL-CCNC: 114 U/L (ref 39–117)
ALT SERPL W P-5'-P-CCNC: 20 U/L (ref 1–41)
ANION GAP SERPL CALCULATED.3IONS-SCNC: 14 MMOL/L (ref 5–15)
AST SERPL-CCNC: 22 U/L (ref 1–40)
BILIRUB SERPL-MCNC: 0.7 MG/DL (ref 0–1.2)
BUN SERPL-MCNC: 10 MG/DL (ref 8–23)
BUN/CREAT SERPL: 9.7 (ref 7–25)
CALCIUM SPEC-SCNC: 9.2 MG/DL (ref 8.6–10.5)
CHLORIDE SERPL-SCNC: 98 MMOL/L (ref 98–107)
CO2 SERPL-SCNC: 25 MMOL/L (ref 22–29)
CREAT SERPL-MCNC: 1.03 MG/DL (ref 0.76–1.27)
CREAT UR-MCNC: 77.2 MG/DL
EGFRCR SERPLBLD CKD-EPI 2021: 80.6 ML/MIN/1.73
GLOBULIN UR ELPH-MCNC: 3.5 GM/DL
GLUCOSE SERPL-MCNC: 136 MG/DL (ref 65–99)
HBA1C MFR BLD: 7.2 % (ref 4.8–5.6)
MICROALBUMIN/CREAT UR: 18.1 MG/G (ref 0–29)
POTASSIUM SERPL-SCNC: 3.5 MMOL/L (ref 3.5–5.2)
PROT SERPL-MCNC: 7.5 G/DL (ref 6–8.5)
PSA SERPL-MCNC: 0.75 NG/ML (ref 0–4)
SODIUM SERPL-SCNC: 137 MMOL/L (ref 136–145)

## 2025-08-12 PROCEDURE — 82043 UR ALBUMIN QUANTITATIVE: CPT

## 2025-08-12 PROCEDURE — 82570 ASSAY OF URINE CREATININE: CPT

## 2025-08-12 PROCEDURE — G0103 PSA SCREENING: HCPCS

## 2025-08-12 PROCEDURE — 83036 HEMOGLOBIN GLYCOSYLATED A1C: CPT

## 2025-08-12 PROCEDURE — 36415 COLL VENOUS BLD VENIPUNCTURE: CPT

## 2025-08-12 PROCEDURE — 80053 COMPREHEN METABOLIC PANEL: CPT

## 2025-08-13 ENCOUNTER — RESULTS FOLLOW-UP (OUTPATIENT)
Dept: FAMILY MEDICINE CLINIC | Age: 65
End: 2025-08-13
Payer: COMMERCIAL

## 2025-08-18 DIAGNOSIS — R10.13 EPIGASTRIC ABDOMINAL PAIN: ICD-10-CM

## 2025-08-18 DIAGNOSIS — K21.9 GASTROESOPHAGEAL REFLUX DISEASE WITHOUT ESOPHAGITIS: Primary | ICD-10-CM

## 2025-08-18 RX ORDER — OMEPRAZOLE 40 MG/1
40 CAPSULE, DELAYED RELEASE ORAL DAILY
Qty: 90 CAPSULE | Refills: 3 | Status: SHIPPED | OUTPATIENT
Start: 2025-08-18

## (undated) DEVICE — EGD OR ERCP KIT: Brand: MEDLINE INDUSTRIES, INC.

## (undated) DEVICE — SOL IRRG H2O PL/BG 1000ML STRL

## (undated) DEVICE — SINGLE-USE BIOPSY FORCEPS: Brand: RADIAL JAW 4

## (undated) DEVICE — Device: Brand: DEFENDO AIR/WATER/SUCTION AND BIOPSY VALVE

## (undated) DEVICE — THE SINGLE USE ETRAP – POLYP TRAP IS USED FOR SUCTION RETRIEVAL OF ENDOSCOPICALLY REMOVED POLYPS.: Brand: ETRAP

## (undated) DEVICE — COLON KIT: Brand: MEDLINE INDUSTRIES, INC.

## (undated) DEVICE — SNAR E/S POLYP SNAREMASTER OVL/10MM 2.8X2300MM YEL